# Patient Record
Sex: MALE | Race: WHITE | Employment: UNEMPLOYED | URBAN - METROPOLITAN AREA
[De-identification: names, ages, dates, MRNs, and addresses within clinical notes are randomized per-mention and may not be internally consistent; named-entity substitution may affect disease eponyms.]

---

## 2024-05-22 LAB
CHOLEST SERPL-MCNC: 114 MG/DL
HBA1C MFR BLD: 6 % (ref 4–5.6)
HDLC SERPL-MCNC: 45 MG/DL
LDLC SERPL CALC-MCNC: 45 MG/DL
TRIGL SERPL-MCNC: 120 MG/DL
VLDLC SERPL CALC-MCNC: 24 MG/DL

## 2024-05-28 LAB
ABSOLUTE BANDS: NORMAL K/UL (ref 0–1)
ABSOLUTE PLASMA CELLS: NORMAL K/UL
ALBUMIN SERPL-MCNC: 3.4 G/DL (ref 3.5–5.2)
ALP SERPL-CCNC: 77 U/L (ref 40–129)
ALT SERPL-CCNC: <5 U/L (ref 0–40)
ANION GAP SERPL CALCULATED.3IONS-SCNC: 13 MMOL/L (ref 7–16)
AST SERPL-CCNC: 30 U/L (ref 0–39)
ATYPICAL LYMPHOCYTE ABSOLUTE COUNT: NORMAL K/UL
ATYPICAL LYMPHOCYTES: NORMAL %
BANDS: NORMAL %
BASOPHILS # BLD: NORMAL K/UL (ref 0–0.2)
BASOPHILS NFR BLD: NORMAL % (ref 0–2)
BILIRUB SERPL-MCNC: 0.4 MG/DL (ref 0–1.2)
BLASTS ABSOLUTE COUNT: NORMAL K/UL
BLASTS: NORMAL %
BUN SERPL-MCNC: 9 MG/DL (ref 6–23)
CALCIUM SERPL-MCNC: 9.4 MG/DL (ref 8.6–10.2)
CHLORIDE SERPL-SCNC: 112 MMOL/L (ref 98–107)
CK SERPL-CCNC: 178 U/L (ref 20–200)
CO2 SERPL-SCNC: 22 MMOL/L (ref 22–29)
CREAT SERPL-MCNC: 1 MG/DL (ref 0.7–1.2)
EOSINOPHIL # BLD: NORMAL K/UL (ref 0–0.4)
EOSINOPHILS RELATIVE PERCENT: NORMAL % (ref 1–4)
ERYTHROCYTE [DISTWIDTH] IN BLOOD BY AUTOMATED COUNT: NORMAL % (ref 11.8–14.4)
GFR, ESTIMATED: 90 ML/MIN/1.73M2
GLUCOSE SERPL-MCNC: 131 MG/DL (ref 74–99)
HCT VFR BLD AUTO: NORMAL % (ref 40.7–50.3)
HGB BLD-MCNC: NORMAL G/DL (ref 13–17)
IMM GRANULOCYTES # BLD AUTO: NORMAL K/UL (ref 0–0.3)
IMM GRANULOCYTES NFR BLD: NORMAL %
LITHIUM DATE LAST DOSE: NORMAL
LITHIUM DOSE AMOUNT: NORMAL
LITHIUM DOSE TIME: NORMAL
LITHIUM LEVEL: 1 MMOL/L (ref 0.5–1.5)
LYMPHOCYTES NFR BLD: NORMAL K/UL (ref 1–4.8)
LYMPHOCYTES RELATIVE PERCENT: NORMAL % (ref 24–44)
MCH RBC QN AUTO: NORMAL PG (ref 25.2–33.5)
MCHC RBC AUTO-ENTMCNC: NORMAL G/DL (ref 28.4–34.8)
MCV RBC AUTO: NORMAL FL (ref 82.6–102.9)
METAMYELOCYTES ABSOLUTE COUNT: NORMAL K/UL
METAMYELOCYTES: NORMAL %
MONOCYTES NFR BLD: NORMAL % (ref 1–7)
MONOCYTES NFR BLD: NORMAL K/UL (ref 0.1–0.8)
MYELOCYTES ABSOLUTE COUNT: NORMAL K/UL
MYELOCYTES: NORMAL %
NEUTROPHILS NFR BLD: NORMAL % (ref 36–66)
NEUTS SEG NFR BLD: NORMAL K/UL (ref 1.8–7.7)
NRBC BLD-RTO: NORMAL PER 100 WBC
NUCLEATED RED BLOOD CELLS: NORMAL PER 100 WBC
PLASMA CELLS: NORMAL %
PLATELET # BLD AUTO: NORMAL K/UL (ref 138–453)
PLATELET ESTIMATE: NORMAL
PLATELET, FLUORESCENCE: NORMAL K/UL (ref 138–453)
PLATELETS.RETICULATED NFR BLD AUTO: NORMAL % (ref 1.1–10.3)
PMV BLD AUTO: NORMAL FL (ref 8.1–13.5)
POTASSIUM SERPL-SCNC: 4.5 MMOL/L (ref 3.5–5)
PROMYELOCYTES ABSOLUTE COUNT: NORMAL K/UL
PROMYELOCYTES: NORMAL %
PROT SERPL-MCNC: 5.9 G/DL (ref 6.4–8.3)
RBC # BLD AUTO: NORMAL M/UL (ref 4.21–5.77)
RBC # BLD: NORMAL 10*6/UL
SODIUM SERPL-SCNC: 147 MMOL/L (ref 132–146)
WBC # BLD: NORMAL 10*3/UL
WBC OTHER # BLD: NORMAL K/UL (ref 3.5–11.3)

## 2024-05-29 LAB
BASOPHILS # BLD: 0.03 K/UL (ref 0–0.2)
BASOPHILS NFR BLD: 0 % (ref 0–2)
EOSINOPHIL # BLD: 0.17 K/UL (ref 0.05–0.5)
EOSINOPHILS RELATIVE PERCENT: 2 % (ref 0–6)
ERYTHROCYTE [DISTWIDTH] IN BLOOD BY AUTOMATED COUNT: 15.9 % (ref 11.5–15)
HCT VFR BLD AUTO: 33.5 % (ref 37–54)
HGB BLD-MCNC: 10.9 G/DL (ref 12.5–16.5)
IMM GRANULOCYTES # BLD AUTO: 0.06 K/UL (ref 0–0.58)
IMM GRANULOCYTES NFR BLD: 1 % (ref 0–5)
LYMPHOCYTES NFR BLD: 1.34 K/UL (ref 1.5–4)
LYMPHOCYTES RELATIVE PERCENT: 18 % (ref 20–42)
MCH RBC QN AUTO: 33.9 PG (ref 26–35)
MCHC RBC AUTO-ENTMCNC: 32.5 G/DL (ref 32–34.5)
MCV RBC AUTO: 104 FL (ref 80–99.9)
MONOCYTES NFR BLD: 0.96 K/UL (ref 0.1–0.95)
MONOCYTES NFR BLD: 13 % (ref 2–12)
NEUTROPHILS NFR BLD: 65 % (ref 43–80)
NEUTS SEG NFR BLD: 4.82 K/UL (ref 1.8–7.3)
PLATELET # BLD AUTO: 278 K/UL (ref 130–450)
PMV BLD AUTO: 9.6 FL (ref 7–12)
RBC # BLD AUTO: 3.22 M/UL (ref 3.8–5.8)
WBC OTHER # BLD: 7.4 K/UL (ref 4.5–11.5)

## 2024-05-30 LAB
BACTERIA URNS QL MICRO: ABNORMAL
BILIRUB UR QL STRIP: NEGATIVE
CLARITY UR: CLEAR
COLOR UR: YELLOW
EPI CELLS #/AREA URNS HPF: ABNORMAL /HPF
GLUCOSE UR STRIP-MCNC: NEGATIVE MG/DL
HGB UR QL STRIP.AUTO: NEGATIVE
KETONES UR STRIP-MCNC: NEGATIVE MG/DL
LEUKOCYTE ESTERASE UR QL STRIP: ABNORMAL
NITRITE UR QL STRIP: NEGATIVE
PH UR STRIP: 6 [PH] (ref 5–9)
PROT UR STRIP-MCNC: NEGATIVE MG/DL
RBC #/AREA URNS HPF: ABNORMAL /HPF
SP GR UR STRIP: 1.01 (ref 1–1.03)
UROBILINOGEN UR STRIP-ACNC: 0.2 EU/DL (ref 0–1)
WBC #/AREA URNS HPF: ABNORMAL /HPF

## 2024-05-31 LAB
MICROORGANISM SPEC CULT: ABNORMAL
MICROORGANISM SPEC CULT: ABNORMAL
SPECIMEN DESCRIPTION: ABNORMAL

## 2024-06-02 ENCOUNTER — APPOINTMENT (OUTPATIENT)
Dept: CT IMAGING | Age: 59
DRG: 004 | End: 2024-06-02
Payer: MEDICARE

## 2024-06-02 ENCOUNTER — APPOINTMENT (OUTPATIENT)
Dept: GENERAL RADIOLOGY | Age: 59
DRG: 004 | End: 2024-06-02
Payer: MEDICARE

## 2024-06-02 ENCOUNTER — HOSPITAL ENCOUNTER (INPATIENT)
Age: 59
LOS: 18 days | Discharge: ANOTHER HOSPITAL | DRG: 004 | End: 2024-06-20
Attending: EMERGENCY MEDICINE | Admitting: HOSPITALIST
Payer: MEDICARE

## 2024-06-02 DIAGNOSIS — R41.0 DISORIENTATION: Primary | ICD-10-CM

## 2024-06-02 DIAGNOSIS — M79.601 PAIN IN BOTH UPPER EXTREMITIES: ICD-10-CM

## 2024-06-02 DIAGNOSIS — J96.00 ACUTE RESPIRATORY FAILURE, UNSPECIFIED WHETHER WITH HYPOXIA OR HYPERCAPNIA (HCC): ICD-10-CM

## 2024-06-02 DIAGNOSIS — R41.82 ALTERED MENTAL STATUS, UNSPECIFIED ALTERED MENTAL STATUS TYPE: ICD-10-CM

## 2024-06-02 DIAGNOSIS — L02.419 CELLULITIS AND ABSCESS OF LEG: ICD-10-CM

## 2024-06-02 DIAGNOSIS — M79.602 PAIN IN BOTH UPPER EXTREMITIES: ICD-10-CM

## 2024-06-02 DIAGNOSIS — L03.119 CELLULITIS AND ABSCESS OF LEG: ICD-10-CM

## 2024-06-02 PROBLEM — J45.909 ASTHMA: Status: ACTIVE | Noted: 2024-06-02

## 2024-06-02 PROBLEM — L03.90 CELLULITIS: Status: ACTIVE | Noted: 2024-06-02

## 2024-06-02 PROBLEM — K21.9 GERD (GASTROESOPHAGEAL REFLUX DISEASE): Status: ACTIVE | Noted: 2024-06-02

## 2024-06-02 PROBLEM — E03.9 HYPOTHYROID: Status: ACTIVE | Noted: 2024-06-02

## 2024-06-02 PROBLEM — F32.A DEPRESSION: Status: ACTIVE | Noted: 2024-06-02

## 2024-06-02 PROBLEM — E87.0 HYPERNATREMIA: Status: ACTIVE | Noted: 2024-06-02

## 2024-06-02 PROBLEM — E78.5 HLD (HYPERLIPIDEMIA): Status: ACTIVE | Noted: 2024-06-02

## 2024-06-02 PROBLEM — E87.1 HYPONATREMIA: Status: ACTIVE | Noted: 2024-06-02

## 2024-06-02 PROBLEM — E87.1 HYPONATREMIA: Status: RESOLVED | Noted: 2024-06-02 | Resolved: 2024-06-02

## 2024-06-02 PROBLEM — F41.9 ANXIETY: Status: ACTIVE | Noted: 2024-06-02

## 2024-06-02 LAB
ALBUMIN SERPL-MCNC: 3 G/DL (ref 3.5–5.2)
ALP SERPL-CCNC: 74 U/L (ref 40–129)
ALT SERPL-CCNC: 6 U/L (ref 0–40)
AMMONIA PLAS-SCNC: 29 UMOL/L (ref 16–60)
AMPHET UR QL SCN: NEGATIVE
ANION GAP SERPL CALCULATED.3IONS-SCNC: 12 MMOL/L (ref 7–16)
APAP SERPL-MCNC: <5 UG/ML (ref 10–30)
AST SERPL-CCNC: 22 U/L (ref 0–39)
B PARAP IS1001 DNA NPH QL NAA+NON-PROBE: NOT DETECTED
B PERT DNA SPEC QL NAA+PROBE: NOT DETECTED
BARBITURATES UR QL SCN: NEGATIVE
BASOPHILS # BLD: 0.04 K/UL (ref 0–0.2)
BASOPHILS NFR BLD: 1 % (ref 0–2)
BENZODIAZ UR QL: NEGATIVE
BILIRUB SERPL-MCNC: 0.6 MG/DL (ref 0–1.2)
BILIRUB UR QL STRIP: NEGATIVE
BUN SERPL-MCNC: 8 MG/DL (ref 6–23)
BUPRENORPHINE UR QL: NEGATIVE
C PNEUM DNA NPH QL NAA+NON-PROBE: NOT DETECTED
CALCIUM SERPL-MCNC: 9.2 MG/DL (ref 8.6–10.2)
CANNABINOIDS UR QL SCN: NEGATIVE
CHLORIDE SERPL-SCNC: 115 MMOL/L (ref 98–107)
CLARITY UR: CLEAR
CO2 SERPL-SCNC: 21 MMOL/L (ref 22–29)
COCAINE UR QL SCN: NEGATIVE
COLOR UR: YELLOW
CORTIS SERPL-MCNC: 12.3 UG/DL (ref 2.7–18.4)
CORTISOL COLLECTION INFO: NORMAL
CREAT SERPL-MCNC: 1 MG/DL (ref 0.7–1.2)
CRP SERPL HS-MCNC: 37 MG/L (ref 0–5)
EOSINOPHIL # BLD: 0.17 K/UL (ref 0.05–0.5)
EOSINOPHILS RELATIVE PERCENT: 2 % (ref 0–6)
ERYTHROCYTE [DISTWIDTH] IN BLOOD BY AUTOMATED COUNT: 15.8 % (ref 11.5–15)
ETHANOLAMINE SERPL-MCNC: <10 MG/DL (ref 0–0.08)
FENTANYL UR QL: NEGATIVE
FLUAV RNA NPH QL NAA+NON-PROBE: NOT DETECTED
FLUBV RNA NPH QL NAA+NON-PROBE: NOT DETECTED
GFR, ESTIMATED: 83 ML/MIN/1.73M2
GLUCOSE SERPL-MCNC: 106 MG/DL (ref 74–99)
GLUCOSE UR STRIP-MCNC: NEGATIVE MG/DL
HADV DNA NPH QL NAA+NON-PROBE: NOT DETECTED
HCOV 229E RNA NPH QL NAA+NON-PROBE: NOT DETECTED
HCOV HKU1 RNA NPH QL NAA+NON-PROBE: NOT DETECTED
HCOV NL63 RNA NPH QL NAA+NON-PROBE: NOT DETECTED
HCOV OC43 RNA NPH QL NAA+NON-PROBE: NOT DETECTED
HCT VFR BLD AUTO: 29.2 % (ref 37–54)
HGB BLD-MCNC: 9.1 G/DL (ref 12.5–16.5)
HGB UR QL STRIP.AUTO: NEGATIVE
HMPV RNA NPH QL NAA+NON-PROBE: NOT DETECTED
HPIV1 RNA NPH QL NAA+NON-PROBE: NOT DETECTED
HPIV2 RNA NPH QL NAA+NON-PROBE: NOT DETECTED
HPIV3 RNA NPH QL NAA+NON-PROBE: DETECTED
HPIV4 RNA NPH QL NAA+NON-PROBE: NOT DETECTED
IMM GRANULOCYTES # BLD AUTO: 0.04 K/UL (ref 0–0.58)
IMM GRANULOCYTES NFR BLD: 1 % (ref 0–5)
KETONES UR STRIP-MCNC: NEGATIVE MG/DL
LACTATE BLDV-SCNC: 1 MMOL/L (ref 0.5–1.9)
LEUKOCYTE ESTERASE UR QL STRIP: NEGATIVE
LYMPHOCYTES NFR BLD: 0.71 K/UL (ref 1.5–4)
LYMPHOCYTES RELATIVE PERCENT: 10 % (ref 20–42)
M PNEUMO DNA NPH QL NAA+NON-PROBE: NOT DETECTED
MAGNESIUM SERPL-MCNC: 2.1 MG/DL (ref 1.6–2.6)
MCH RBC QN AUTO: 32.9 PG (ref 26–35)
MCHC RBC AUTO-ENTMCNC: 31.2 G/DL (ref 32–34.5)
MCV RBC AUTO: 105.4 FL (ref 80–99.9)
METHADONE UR QL: NEGATIVE
MONOCYTES NFR BLD: 1.08 K/UL (ref 0.1–0.95)
MONOCYTES NFR BLD: 15 % (ref 2–12)
NEUTROPHILS NFR BLD: 71 % (ref 43–80)
NEUTS SEG NFR BLD: 5.12 K/UL (ref 1.8–7.3)
NITRITE UR QL STRIP: NEGATIVE
OPIATES UR QL SCN: NEGATIVE
OXYCODONE UR QL SCN: NEGATIVE
PCP UR QL SCN: NEGATIVE
PH UR STRIP: 7 [PH] (ref 5–9)
PLATELET # BLD AUTO: 316 K/UL (ref 130–450)
PMV BLD AUTO: 9.8 FL (ref 7–12)
POTASSIUM SERPL-SCNC: 3.5 MMOL/L (ref 3.5–5)
PROCALCITONIN SERPL-MCNC: 0.2 NG/ML (ref 0–0.08)
PROT SERPL-MCNC: 5.5 G/DL (ref 6.4–8.3)
PROT UR STRIP-MCNC: NEGATIVE MG/DL
RBC # BLD AUTO: 2.77 M/UL (ref 3.8–5.8)
RSV RNA NPH QL NAA+NON-PROBE: NOT DETECTED
RV+EV RNA NPH QL NAA+NON-PROBE: NOT DETECTED
SALICYLATES SERPL-MCNC: <0.3 MG/DL (ref 0–30)
SARS-COV-2 RNA NPH QL NAA+NON-PROBE: NOT DETECTED
SODIUM SERPL-SCNC: 148 MMOL/L (ref 132–146)
SP GR UR STRIP: <1.005 (ref 1–1.03)
SPECIMEN DESCRIPTION: ABNORMAL
T4 SERPL-MCNC: 5 UG/DL (ref 4.5–11.7)
TEST INFORMATION: NORMAL
TOXIC TRICYCLIC SC,BLOOD: NEGATIVE
TROPONIN I SERPL HS-MCNC: 146 NG/L (ref 0–11)
TROPONIN I SERPL HS-MCNC: 172 NG/L (ref 0–11)
TSH SERPL DL<=0.05 MIU/L-ACNC: 2.24 UIU/ML (ref 0.27–4.2)
UROBILINOGEN UR STRIP-ACNC: 0.2 EU/DL (ref 0–1)
WBC OTHER # BLD: 7.2 K/UL (ref 4.5–11.5)

## 2024-06-02 PROCEDURE — 2580000003 HC RX 258

## 2024-06-02 PROCEDURE — 80179 DRUG ASSAY SALICYLATE: CPT

## 2024-06-02 PROCEDURE — 0202U NFCT DS 22 TRGT SARS-COV-2: CPT

## 2024-06-02 PROCEDURE — 72125 CT NECK SPINE W/O DYE: CPT

## 2024-06-02 PROCEDURE — 70450 CT HEAD/BRAIN W/O DYE: CPT

## 2024-06-02 PROCEDURE — 99221 1ST HOSP IP/OBS SF/LOW 40: CPT | Performed by: NURSE PRACTITIONER

## 2024-06-02 PROCEDURE — 6360000002 HC RX W HCPCS

## 2024-06-02 PROCEDURE — 80053 COMPREHEN METABOLIC PANEL: CPT

## 2024-06-02 PROCEDURE — 84484 ASSAY OF TROPONIN QUANT: CPT

## 2024-06-02 PROCEDURE — 71045 X-RAY EXAM CHEST 1 VIEW: CPT

## 2024-06-02 PROCEDURE — 99285 EMERGENCY DEPT VISIT HI MDM: CPT

## 2024-06-02 PROCEDURE — 86140 C-REACTIVE PROTEIN: CPT

## 2024-06-02 PROCEDURE — G0480 DRUG TEST DEF 1-7 CLASSES: HCPCS

## 2024-06-02 PROCEDURE — 6360000002 HC RX W HCPCS: Performed by: NURSE PRACTITIONER

## 2024-06-02 PROCEDURE — 82533 TOTAL CORTISOL: CPT

## 2024-06-02 PROCEDURE — 81003 URINALYSIS AUTO W/O SCOPE: CPT

## 2024-06-02 PROCEDURE — 83605 ASSAY OF LACTIC ACID: CPT

## 2024-06-02 PROCEDURE — 2580000003 HC RX 258: Performed by: NURSE PRACTITIONER

## 2024-06-02 PROCEDURE — 80143 DRUG ASSAY ACETAMINOPHEN: CPT

## 2024-06-02 PROCEDURE — 87040 BLOOD CULTURE FOR BACTERIA: CPT

## 2024-06-02 PROCEDURE — 6370000000 HC RX 637 (ALT 250 FOR IP): Performed by: NURSE PRACTITIONER

## 2024-06-02 PROCEDURE — 2060000000 HC ICU INTERMEDIATE R&B

## 2024-06-02 PROCEDURE — 82140 ASSAY OF AMMONIA: CPT

## 2024-06-02 PROCEDURE — 84436 ASSAY OF TOTAL THYROXINE: CPT

## 2024-06-02 PROCEDURE — 93005 ELECTROCARDIOGRAM TRACING: CPT

## 2024-06-02 PROCEDURE — 96365 THER/PROPH/DIAG IV INF INIT: CPT

## 2024-06-02 PROCEDURE — 84443 ASSAY THYROID STIM HORMONE: CPT

## 2024-06-02 PROCEDURE — 84145 PROCALCITONIN (PCT): CPT

## 2024-06-02 PROCEDURE — 83735 ASSAY OF MAGNESIUM: CPT

## 2024-06-02 PROCEDURE — 80307 DRUG TEST PRSMV CHEM ANLYZR: CPT

## 2024-06-02 PROCEDURE — 85025 COMPLETE CBC W/AUTO DIFF WBC: CPT

## 2024-06-02 PROCEDURE — 96375 TX/PRO/DX INJ NEW DRUG ADDON: CPT

## 2024-06-02 RX ORDER — POTASSIUM CHLORIDE 20 MEQ/1
40 TABLET, EXTENDED RELEASE ORAL PRN
Status: DISCONTINUED | OUTPATIENT
Start: 2024-06-02 | End: 2024-06-20 | Stop reason: HOSPADM

## 2024-06-02 RX ORDER — GUAIFENESIN/DEXTROMETHORPHAN 100-10MG/5
10 SYRUP ORAL EVERY 4 HOURS PRN
COMMUNITY

## 2024-06-02 RX ORDER — AZATHIOPRINE 50 MG/1
150 TABLET ORAL DAILY
COMMUNITY

## 2024-06-02 RX ORDER — MEDROXYPROGESTERONE ACETATE 10 MG/1
10 TABLET ORAL DAILY
Status: DISCONTINUED | OUTPATIENT
Start: 2024-06-02 | End: 2024-06-20 | Stop reason: HOSPADM

## 2024-06-02 RX ORDER — AMLODIPINE BESYLATE 2.5 MG/1
2.5 TABLET ORAL DAILY
COMMUNITY

## 2024-06-02 RX ORDER — PANTOPRAZOLE SODIUM 40 MG/1
40 TABLET, DELAYED RELEASE ORAL DAILY
COMMUNITY

## 2024-06-02 RX ORDER — HALOPERIDOL 5 MG/1
5 TABLET ORAL EVERY 6 HOURS PRN
COMMUNITY

## 2024-06-02 RX ORDER — GABAPENTIN 800 MG/1
800 TABLET ORAL 3 TIMES DAILY
COMMUNITY

## 2024-06-02 RX ORDER — IBUPROFEN 200 MG
1 TABLET ORAL DAILY
COMMUNITY

## 2024-06-02 RX ORDER — ATORVASTATIN CALCIUM 10 MG/1
10 TABLET, FILM COATED ORAL
Status: DISCONTINUED | OUTPATIENT
Start: 2024-06-02 | End: 2024-06-20 | Stop reason: HOSPADM

## 2024-06-02 RX ORDER — FAMOTIDINE 20 MG/1
20 TABLET, FILM COATED ORAL DAILY
Status: DISCONTINUED | OUTPATIENT
Start: 2024-06-02 | End: 2024-06-07

## 2024-06-02 RX ORDER — FINASTERIDE 5 MG/1
5 TABLET, FILM COATED ORAL DAILY
Status: DISCONTINUED | OUTPATIENT
Start: 2024-06-02 | End: 2024-06-20 | Stop reason: HOSPADM

## 2024-06-02 RX ORDER — ONDANSETRON 4 MG/1
4 TABLET, ORALLY DISINTEGRATING ORAL EVERY 8 HOURS PRN
Status: DISCONTINUED | OUTPATIENT
Start: 2024-06-02 | End: 2024-06-20 | Stop reason: HOSPADM

## 2024-06-02 RX ORDER — ACETAMINOPHEN 650 MG/1
650 SUPPOSITORY RECTAL EVERY 6 HOURS PRN
Status: DISCONTINUED | OUTPATIENT
Start: 2024-06-02 | End: 2024-06-20 | Stop reason: HOSPADM

## 2024-06-02 RX ORDER — BENZTROPINE MESYLATE 0.5 MG/1
1 TABLET ORAL 2 TIMES DAILY PRN
Status: DISCONTINUED | OUTPATIENT
Start: 2024-06-02 | End: 2024-06-20 | Stop reason: HOSPADM

## 2024-06-02 RX ORDER — 0.9 % SODIUM CHLORIDE 0.9 %
1000 INTRAVENOUS SOLUTION INTRAVENOUS ONCE
Status: COMPLETED | OUTPATIENT
Start: 2024-06-02 | End: 2024-06-02

## 2024-06-02 RX ORDER — PRAZOSIN HYDROCHLORIDE 2 MG/1
2 CAPSULE ORAL NIGHTLY
Status: DISCONTINUED | OUTPATIENT
Start: 2024-06-02 | End: 2024-06-03

## 2024-06-02 RX ORDER — AZATHIOPRINE 50 MG/1
150 TABLET ORAL DAILY
Status: DISCONTINUED | OUTPATIENT
Start: 2024-06-02 | End: 2024-06-20 | Stop reason: HOSPADM

## 2024-06-02 RX ORDER — PROPRANOLOL HYDROCHLORIDE 10 MG/1
10 TABLET ORAL 2 TIMES DAILY
Status: DISCONTINUED | OUTPATIENT
Start: 2024-06-02 | End: 2024-06-20 | Stop reason: HOSPADM

## 2024-06-02 RX ORDER — FINASTERIDE 5 MG/1
5 TABLET, FILM COATED ORAL DAILY
COMMUNITY

## 2024-06-02 RX ORDER — POLYETHYLENE GLYCOL 3350 17 G/17G
17 POWDER, FOR SOLUTION ORAL DAILY PRN
Status: DISCONTINUED | OUTPATIENT
Start: 2024-06-02 | End: 2024-06-20 | Stop reason: HOSPADM

## 2024-06-02 RX ORDER — PROPRANOLOL HYDROCHLORIDE 10 MG/1
10 TABLET ORAL 2 TIMES DAILY
COMMUNITY

## 2024-06-02 RX ORDER — TAMSULOSIN HYDROCHLORIDE 0.4 MG/1
0.4 CAPSULE ORAL
Status: DISCONTINUED | OUTPATIENT
Start: 2024-06-02 | End: 2024-06-20 | Stop reason: HOSPADM

## 2024-06-02 RX ORDER — ATORVASTATIN CALCIUM 10 MG/1
10 TABLET, FILM COATED ORAL
COMMUNITY

## 2024-06-02 RX ORDER — OXYBUTYNIN CHLORIDE 5 MG/1
5 TABLET ORAL 2 TIMES DAILY
Status: DISCONTINUED | OUTPATIENT
Start: 2024-06-02 | End: 2024-06-20 | Stop reason: HOSPADM

## 2024-06-02 RX ORDER — LITHIUM CARBONATE 300 MG/1
600 TABLET, FILM COATED, EXTENDED RELEASE ORAL
COMMUNITY

## 2024-06-02 RX ORDER — ENOXAPARIN SODIUM 100 MG/ML
30 INJECTION SUBCUTANEOUS 2 TIMES DAILY
Status: DISCONTINUED | OUTPATIENT
Start: 2024-06-02 | End: 2024-06-02 | Stop reason: ALTCHOICE

## 2024-06-02 RX ORDER — QUETIAPINE FUMARATE 400 MG/1
400 TABLET, FILM COATED ORAL
COMMUNITY

## 2024-06-02 RX ORDER — CEPHALEXIN 500 MG/1
500 CAPSULE ORAL 3 TIMES DAILY
COMMUNITY
Start: 2024-06-01 | End: 2024-06-05

## 2024-06-02 RX ORDER — POLYETHYLENE GLYCOL 3350 17 G/17G
17 POWDER, FOR SOLUTION ORAL DAILY PRN
COMMUNITY

## 2024-06-02 RX ORDER — ONDANSETRON 2 MG/ML
4 INJECTION INTRAMUSCULAR; INTRAVENOUS EVERY 6 HOURS PRN
Status: DISCONTINUED | OUTPATIENT
Start: 2024-06-02 | End: 2024-06-20 | Stop reason: HOSPADM

## 2024-06-02 RX ORDER — SODIUM CHLORIDE 9 MG/ML
INJECTION, SOLUTION INTRAVENOUS PRN
Status: DISCONTINUED | OUTPATIENT
Start: 2024-06-02 | End: 2024-06-07

## 2024-06-02 RX ORDER — HALOPERIDOL 5 MG/ML
5 INJECTION INTRAMUSCULAR EVERY 6 HOURS PRN
COMMUNITY

## 2024-06-02 RX ORDER — OXYBUTYNIN CHLORIDE 5 MG/1
5 TABLET ORAL 2 TIMES DAILY
COMMUNITY

## 2024-06-02 RX ORDER — MAGNESIUM SULFATE IN WATER 40 MG/ML
2000 INJECTION, SOLUTION INTRAVENOUS PRN
Status: DISCONTINUED | OUTPATIENT
Start: 2024-06-02 | End: 2024-06-20 | Stop reason: HOSPADM

## 2024-06-02 RX ORDER — HYDROXYZINE PAMOATE 50 MG/1
50 CAPSULE ORAL 4 TIMES DAILY PRN
COMMUNITY

## 2024-06-02 RX ORDER — SODIUM CHLORIDE 0.9 % (FLUSH) 0.9 %
5-40 SYRINGE (ML) INJECTION PRN
Status: DISCONTINUED | OUTPATIENT
Start: 2024-06-02 | End: 2024-06-07

## 2024-06-02 RX ORDER — GABAPENTIN 400 MG/1
800 CAPSULE ORAL 3 TIMES DAILY
Status: DISCONTINUED | OUTPATIENT
Start: 2024-06-02 | End: 2024-06-20 | Stop reason: HOSPADM

## 2024-06-02 RX ORDER — QUETIAPINE FUMARATE 200 MG/1
400 TABLET, FILM COATED ORAL
Status: DISCONTINUED | OUTPATIENT
Start: 2024-06-02 | End: 2024-06-03

## 2024-06-02 RX ORDER — ACETAMINOPHEN 325 MG/1
650 TABLET ORAL EVERY 6 HOURS PRN
Status: DISCONTINUED | OUTPATIENT
Start: 2024-06-02 | End: 2024-06-20 | Stop reason: HOSPADM

## 2024-06-02 RX ORDER — POTASSIUM CHLORIDE 7.45 MG/ML
10 INJECTION INTRAVENOUS PRN
Status: DISCONTINUED | OUTPATIENT
Start: 2024-06-02 | End: 2024-06-20 | Stop reason: HOSPADM

## 2024-06-02 RX ORDER — PALIPERIDONE 6 MG/1
6 TABLET, EXTENDED RELEASE ORAL EVERY MORNING
Status: DISCONTINUED | OUTPATIENT
Start: 2024-06-03 | End: 2024-06-03

## 2024-06-02 RX ORDER — FAMOTIDINE 20 MG/1
20 TABLET, FILM COATED ORAL DAILY
COMMUNITY

## 2024-06-02 RX ORDER — TAMSULOSIN HYDROCHLORIDE 0.4 MG/1
0.4 CAPSULE ORAL
COMMUNITY

## 2024-06-02 RX ORDER — PALIPERIDONE 6 MG/1
6 TABLET, EXTENDED RELEASE ORAL EVERY MORNING
COMMUNITY

## 2024-06-02 RX ORDER — ALBUTEROL SULFATE 90 UG/1
1 AEROSOL, METERED RESPIRATORY (INHALATION) EVERY 4 HOURS PRN
COMMUNITY

## 2024-06-02 RX ORDER — ACETAMINOPHEN 325 MG/1
650 TABLET ORAL EVERY 6 HOURS PRN
COMMUNITY

## 2024-06-02 RX ORDER — AMLODIPINE BESYLATE 2.5 MG/1
2.5 TABLET ORAL DAILY
Status: DISCONTINUED | OUTPATIENT
Start: 2024-06-02 | End: 2024-06-14

## 2024-06-02 RX ORDER — HYDROXYZINE PAMOATE 50 MG/1
50 CAPSULE ORAL 4 TIMES DAILY PRN
Status: DISCONTINUED | OUTPATIENT
Start: 2024-06-02 | End: 2024-06-20 | Stop reason: HOSPADM

## 2024-06-02 RX ORDER — MEDROXYPROGESTERONE ACETATE 10 MG/1
10 TABLET ORAL DAILY
COMMUNITY

## 2024-06-02 RX ORDER — SODIUM CHLORIDE 0.9 % (FLUSH) 0.9 %
5-40 SYRINGE (ML) INJECTION EVERY 12 HOURS SCHEDULED
Status: DISCONTINUED | OUTPATIENT
Start: 2024-06-02 | End: 2024-06-07

## 2024-06-02 RX ORDER — PRAZOSIN HYDROCHLORIDE 2 MG/1
2 CAPSULE ORAL NIGHTLY
COMMUNITY

## 2024-06-02 RX ORDER — PANTOPRAZOLE SODIUM 40 MG/1
40 TABLET, DELAYED RELEASE ORAL DAILY
Status: DISCONTINUED | OUTPATIENT
Start: 2024-06-02 | End: 2024-06-07

## 2024-06-02 RX ORDER — LITHIUM CARBONATE 300 MG/1
600 TABLET, FILM COATED, EXTENDED RELEASE ORAL
Status: DISCONTINUED | OUTPATIENT
Start: 2024-06-02 | End: 2024-06-03

## 2024-06-02 RX ORDER — BENZTROPINE MESYLATE 1 MG/1
1 TABLET ORAL 2 TIMES DAILY PRN
COMMUNITY

## 2024-06-02 RX ADMIN — SODIUM CHLORIDE 1000 ML: 9 INJECTION, SOLUTION INTRAVENOUS at 08:42

## 2024-06-02 RX ADMIN — SODIUM CHLORIDE, PRESERVATIVE FREE 10 ML: 5 INJECTION INTRAVENOUS at 20:44

## 2024-06-02 RX ADMIN — APIXABAN 2.5 MG: 5 TABLET, FILM COATED ORAL at 20:29

## 2024-06-02 RX ADMIN — MEDROXYPROGESTERONE ACETATE 10 MG: 10 TABLET ORAL at 23:04

## 2024-06-02 RX ADMIN — AZATHIOPRINE 150 MG: 50 TABLET ORAL at 23:04

## 2024-06-02 RX ADMIN — PRAZOSIN HYDROCHLORIDE 2 MG: 2 CAPSULE ORAL at 20:38

## 2024-06-02 RX ADMIN — FINASTERIDE 5 MG: 5 TABLET, FILM COATED ORAL at 20:41

## 2024-06-02 RX ADMIN — VANCOMYCIN HYDROCHLORIDE 2500 MG: 10 INJECTION, POWDER, LYOPHILIZED, FOR SOLUTION INTRAVENOUS at 09:28

## 2024-06-02 RX ADMIN — CARBIDOPA AND LEVODOPA 1 TABLET: 25; 100 TABLET ORAL at 20:40

## 2024-06-02 RX ADMIN — BENZTROPINE MESYLATE 1 MG: 1 TABLET ORAL at 23:03

## 2024-06-02 RX ADMIN — TAMSULOSIN HYDROCHLORIDE 0.4 MG: 0.4 CAPSULE ORAL at 20:29

## 2024-06-02 RX ADMIN — LITHIUM CARBONATE 600 MG: 300 TABLET, EXTENDED RELEASE ORAL at 20:43

## 2024-06-02 RX ADMIN — ATORVASTATIN CALCIUM 10 MG: 10 TABLET, FILM COATED ORAL at 20:29

## 2024-06-02 RX ADMIN — PIPERACILLIN AND TAZOBACTAM 3375 MG: 3; .375 INJECTION, POWDER, FOR SOLUTION INTRAVENOUS at 08:43

## 2024-06-02 RX ADMIN — HYDROXYZINE PAMOATE 50 MG: 25 CAPSULE ORAL at 20:52

## 2024-06-02 RX ADMIN — PROPRANOLOL HYDROCHLORIDE 10 MG: 10 TABLET ORAL at 20:39

## 2024-06-02 RX ADMIN — QUETIAPINE FUMARATE 400 MG: 200 TABLET ORAL at 20:32

## 2024-06-02 RX ADMIN — OXYBUTYNIN CHLORIDE 5 MG: 5 TABLET ORAL at 20:43

## 2024-06-02 ASSESSMENT — PAIN DESCRIPTION - FREQUENCY: FREQUENCY: CONTINUOUS

## 2024-06-02 ASSESSMENT — PAIN - FUNCTIONAL ASSESSMENT
PAIN_FUNCTIONAL_ASSESSMENT: PREVENTS OR INTERFERES SOME ACTIVE ACTIVITIES AND ADLS
PAIN_FUNCTIONAL_ASSESSMENT: 0-10

## 2024-06-02 ASSESSMENT — PAIN DESCRIPTION - LOCATION: LOCATION: BUTTOCKS

## 2024-06-02 ASSESSMENT — PAIN DESCRIPTION - ORIENTATION: ORIENTATION: MID

## 2024-06-02 ASSESSMENT — PAIN DESCRIPTION - PAIN TYPE: TYPE: ACUTE PAIN

## 2024-06-02 ASSESSMENT — PAIN DESCRIPTION - ONSET: ONSET: ON-GOING

## 2024-06-02 ASSESSMENT — PAIN DESCRIPTION - DESCRIPTORS: DESCRIPTORS: ACHING

## 2024-06-02 NOTE — ED NOTES
Patient found on floor upon arrival at 1145, patient shows no obvious signs of physical trauma aside from initial assessment when patient arrived to ED. Dr. Gorman notified, physician assessment conducted. Unknown if patient hit head due to unwitnessed fall, and patients altered mental status of A+O x2. Aspen collar placed as precaution. Patient was logged rolled onto banks stretcher, and was lifted onto bed with x6 lift assist. Patient was taken CT. Awaiting results at this time.     Initiation for safety sitter was faxed staffing at 1144 due to patient having pulled out IV access with alternatives such as reorientation and redirection.

## 2024-06-02 NOTE — H&P
(ENSURE ENLIVE PO) Take 1 each by mouth 3 times daily (with meals)   Yes Barrera Diamond MD   QUEtiapine (SEROQUEL) 400 MG tablet Take 1 tablet by mouth nightly   Yes Barrera Diamond MD   apixaban (ELIQUIS) 5 MG TABS tablet Take 0.5 tablets by mouth 2 times daily   Yes Barrera Diamond MD   cephALEXin (KEFLEX) 500 MG capsule Take 1 capsule by mouth 3 times daily 6/1/24 6/5/24 Yes Barrera Diamond MD   azaTHIOprine (IMURAN) 50 MG tablet Take 1 tablet by mouth 3 times daily   Yes Barrera Diamond MD   acetaminophen (TYLENOL) 325 MG tablet Take 2 tablets by mouth every 6 hours as needed for Pain   Yes Barrera Diamond MD   polyethylene glycol (GLYCOLAX) 17 g packet Take 1 packet by mouth daily as needed for Constipation   Yes Barrera Diamond MD   diclofenac sodium (VOLTAREN) 1 % GEL Apply 2 g topically 4 times daily as needed for Pain   Yes Barrera Diamond MD   albuterol sulfate HFA (VENTOLIN HFA) 108 (90 Base) MCG/ACT inhaler Inhale 1 puff into the lungs every 4 hours as needed for Shortness of Breath or Wheezing   Yes Barrera Diamond MD   haloperidol (HALDOL) 5 MG tablet Take 1 tablet by mouth every 6 hours as needed (aggression)   Yes Barrera Diamond MD   haloperidol lactate (HALDOL) 5 MG/ML injection Inject 1 mL into the muscle every 6 hours as needed (aggression) Give IM if unable to take orally.   Yes Barrera Diamond MD   benztropine (COGENTIN) 1 MG tablet Take 1 tablet by mouth 2 times daily as needed (EPS symptoms)   Yes Barrera Diamond MD   hydrOXYzine pamoate (VISTARIL) 50 MG capsule Take 1 capsule by mouth 4 times daily as needed for Anxiety   Yes Barrera Diamond MD   benzocaine-menthol (CEPACOL SORE THROAT) 15-3.6 MG lozenge Take 1 lozenge by mouth every 2 hours as needed for Sore Throat   Yes Barrera Diamond MD   guaiFENesin-dextromethorphan (ROBITUSSIN DM) 100-10 MG/5ML syrup Take 10 mLs by mouth every 4 hours as needed for

## 2024-06-02 NOTE — ED PROVIDER NOTES
CRITICAL CARE TIME (.cct)           I am the Primary Clinician of Record.    FINAL IMPRESSION      1. Disorientation    2. Pain in both upper extremities    3. Altered mental status, unspecified altered mental status type    4. Cellulitis and abscess of leg          DISPOSITION/PLAN     DISPOSITION Admitted 06/02/2024 01:42:53 PM      PATIENT REFERRED TO:  No follow-up provider specified.    DISCHARGE MEDICATIONS:  New Prescriptions    No medications on file       DISCONTINUED MEDICATIONS:  Discontinued Medications    No medications on file              (Please note that portions of this note were completed with a voice recognition program.  Efforts were made to edit the dictations but occasionally words are mis-transcribed.)    Sanford Crane II,  (electronically signed)

## 2024-06-03 LAB
ANION GAP SERPL CALCULATED.3IONS-SCNC: 12 MMOL/L (ref 7–16)
ANION GAP SERPL CALCULATED.3IONS-SCNC: 16 MMOL/L (ref 7–16)
BASOPHILS # BLD: 0.05 K/UL (ref 0–0.2)
BASOPHILS NFR BLD: 1 % (ref 0–2)
BUN SERPL-MCNC: 7 MG/DL (ref 6–23)
BUN SERPL-MCNC: 7 MG/DL (ref 6–23)
CALCIUM SERPL-MCNC: 10.3 MG/DL (ref 8.6–10.2)
CALCIUM SERPL-MCNC: 9.8 MG/DL (ref 8.6–10.2)
CHLORIDE SERPL-SCNC: 133 MMOL/L (ref 98–107)
CHLORIDE SERPL-SCNC: 136 MMOL/L (ref 98–107)
CO2 SERPL-SCNC: 19 MMOL/L (ref 22–29)
CO2 SERPL-SCNC: 22 MMOL/L (ref 22–29)
CREAT SERPL-MCNC: 1.1 MG/DL (ref 0.7–1.2)
CREAT SERPL-MCNC: 1.1 MG/DL (ref 0.7–1.2)
EKG ATRIAL RATE: 88 BPM
EKG ATRIAL RATE: 92 BPM
EKG P AXIS: 51 DEGREES
EKG P AXIS: 56 DEGREES
EKG P-R INTERVAL: 136 MS
EKG P-R INTERVAL: 140 MS
EKG Q-T INTERVAL: 370 MS
EKG Q-T INTERVAL: 378 MS
EKG QRS DURATION: 82 MS
EKG QRS DURATION: 86 MS
EKG QTC CALCULATION (BAZETT): 447 MS
EKG QTC CALCULATION (BAZETT): 467 MS
EKG R AXIS: -16 DEGREES
EKG R AXIS: -23 DEGREES
EKG T AXIS: 59 DEGREES
EKG T AXIS: 70 DEGREES
EKG VENTRICULAR RATE: 88 BPM
EKG VENTRICULAR RATE: 92 BPM
EOSINOPHIL # BLD: 0.14 K/UL (ref 0.05–0.5)
EOSINOPHILS RELATIVE PERCENT: 2 % (ref 0–6)
ERYTHROCYTE [DISTWIDTH] IN BLOOD BY AUTOMATED COUNT: 15.7 % (ref 11.5–15)
GFR, ESTIMATED: 78 ML/MIN/1.73M2
GFR, ESTIMATED: 81 ML/MIN/1.73M2
GLUCOSE SERPL-MCNC: 93 MG/DL (ref 74–99)
GLUCOSE SERPL-MCNC: 95 MG/DL (ref 74–99)
HCT VFR BLD AUTO: 34 % (ref 37–54)
HGB BLD-MCNC: 10.2 G/DL (ref 12.5–16.5)
IMM GRANULOCYTES # BLD AUTO: 0.05 K/UL (ref 0–0.58)
IMM GRANULOCYTES NFR BLD: 1 % (ref 0–5)
LITHIUM DATE LAST DOSE: NORMAL
LITHIUM DOSE AMOUNT: NORMAL
LITHIUM DOSE TIME: NORMAL
LITHIUM LEVEL: 0.7 MMOL/L (ref 0.5–1.5)
LYMPHOCYTES NFR BLD: 0.85 K/UL (ref 1.5–4)
LYMPHOCYTES RELATIVE PERCENT: 13 % (ref 20–42)
MAGNESIUM SERPL-MCNC: 2.5 MG/DL (ref 1.6–2.6)
MAGNESIUM SERPL-MCNC: 2.5 MG/DL (ref 1.6–2.6)
MCH RBC QN AUTO: 32.2 PG (ref 26–35)
MCHC RBC AUTO-ENTMCNC: 30 G/DL (ref 32–34.5)
MCV RBC AUTO: 107.3 FL (ref 80–99.9)
MONOCYTES NFR BLD: 1.06 K/UL (ref 0.1–0.95)
MONOCYTES NFR BLD: 16 % (ref 2–12)
NEUTROPHILS NFR BLD: 68 % (ref 43–80)
NEUTS SEG NFR BLD: 4.58 K/UL (ref 1.8–7.3)
PLATELET # BLD AUTO: 353 K/UL (ref 130–450)
PMV BLD AUTO: 9.4 FL (ref 7–12)
POTASSIUM SERPL-SCNC: 3.2 MMOL/L (ref 3.5–5)
POTASSIUM SERPL-SCNC: 3.5 MMOL/L (ref 3.5–5)
RBC # BLD AUTO: 3.17 M/UL (ref 3.8–5.8)
SODIUM SERPL-SCNC: 167 MMOL/L (ref 132–146)
SODIUM SERPL-SCNC: 170 MMOL/L (ref 132–146)
SODIUM SERPL-SCNC: 171 MMOL/L (ref 132–146)
WBC OTHER # BLD: 6.7 K/UL (ref 4.5–11.5)

## 2024-06-03 PROCEDURE — 80048 BASIC METABOLIC PNL TOTAL CA: CPT

## 2024-06-03 PROCEDURE — 6370000000 HC RX 637 (ALT 250 FOR IP): Performed by: NURSE PRACTITIONER

## 2024-06-03 PROCEDURE — 2060000000 HC ICU INTERMEDIATE R&B

## 2024-06-03 PROCEDURE — 6360000002 HC RX W HCPCS: Performed by: NURSE PRACTITIONER

## 2024-06-03 PROCEDURE — 36415 COLL VENOUS BLD VENIPUNCTURE: CPT

## 2024-06-03 PROCEDURE — 6360000002 HC RX W HCPCS: Performed by: STUDENT IN AN ORGANIZED HEALTH CARE EDUCATION/TRAINING PROGRAM

## 2024-06-03 PROCEDURE — 6360000002 HC RX W HCPCS: Performed by: INTERNAL MEDICINE

## 2024-06-03 PROCEDURE — 83735 ASSAY OF MAGNESIUM: CPT

## 2024-06-03 PROCEDURE — 2580000003 HC RX 258: Performed by: INTERNAL MEDICINE

## 2024-06-03 PROCEDURE — 84295 ASSAY OF SERUM SODIUM: CPT

## 2024-06-03 PROCEDURE — 82088 ASSAY OF ALDOSTERONE: CPT

## 2024-06-03 PROCEDURE — 80178 ASSAY OF LITHIUM: CPT

## 2024-06-03 PROCEDURE — 85025 COMPLETE CBC W/AUTO DIFF WBC: CPT

## 2024-06-03 PROCEDURE — 2580000003 HC RX 258: Performed by: STUDENT IN AN ORGANIZED HEALTH CARE EDUCATION/TRAINING PROGRAM

## 2024-06-03 PROCEDURE — 99232 SBSQ HOSP IP/OBS MODERATE 35: CPT | Performed by: INTERNAL MEDICINE

## 2024-06-03 PROCEDURE — 99232 SBSQ HOSP IP/OBS MODERATE 35: CPT | Performed by: NURSE PRACTITIONER

## 2024-06-03 PROCEDURE — 93010 ELECTROCARDIOGRAM REPORT: CPT | Performed by: INTERNAL MEDICINE

## 2024-06-03 RX ORDER — DESMOPRESSIN ACETATE 4 UG/ML
2 INJECTION, SOLUTION INTRAVENOUS; SUBCUTANEOUS ONCE
Status: COMPLETED | OUTPATIENT
Start: 2024-06-03 | End: 2024-06-03

## 2024-06-03 RX ORDER — DESMOPRESSIN ACETATE 4 UG/ML
2 INJECTION, SOLUTION INTRAVENOUS; SUBCUTANEOUS ONCE
Status: DISCONTINUED | OUTPATIENT
Start: 2024-06-03 | End: 2024-06-03 | Stop reason: SDUPTHER

## 2024-06-03 RX ORDER — DEXTROSE MONOHYDRATE 50 MG/ML
INJECTION, SOLUTION INTRAVENOUS CONTINUOUS
Status: DISCONTINUED | OUTPATIENT
Start: 2024-06-03 | End: 2024-06-08

## 2024-06-03 RX ORDER — DIVALPROEX SODIUM 125 MG/1
125 CAPSULE, COATED PELLETS ORAL EVERY 12 HOURS SCHEDULED
Status: DISCONTINUED | OUTPATIENT
Start: 2024-06-03 | End: 2024-06-10

## 2024-06-03 RX ORDER — LANOLIN ALCOHOL/MO/W.PET/CERES
6 CREAM (GRAM) TOPICAL EVERY EVENING
Status: DISPENSED | OUTPATIENT
Start: 2024-06-03 | End: 2024-06-13

## 2024-06-03 RX ADMIN — MEDROXYPROGESTERONE ACETATE 10 MG: 10 TABLET ORAL at 09:16

## 2024-06-03 RX ADMIN — FAMOTIDINE 20 MG: 20 TABLET, FILM COATED ORAL at 09:16

## 2024-06-03 RX ADMIN — DESMOPRESSIN ACETATE 2 MCG: 4 INJECTION, SOLUTION INTRAVENOUS; SUBCUTANEOUS at 14:16

## 2024-06-03 RX ADMIN — TAMSULOSIN HYDROCHLORIDE 0.4 MG: 0.4 CAPSULE ORAL at 20:45

## 2024-06-03 RX ADMIN — FINASTERIDE 5 MG: 5 TABLET, FILM COATED ORAL at 09:15

## 2024-06-03 RX ADMIN — APIXABAN 2.5 MG: 5 TABLET, FILM COATED ORAL at 20:45

## 2024-06-03 RX ADMIN — DEXTROSE MONOHYDRATE: 50 INJECTION, SOLUTION INTRAVENOUS at 11:36

## 2024-06-03 RX ADMIN — ZIPRASIDONE MESYLATE 10 MG: 20 INJECTION, POWDER, LYOPHILIZED, FOR SOLUTION INTRAMUSCULAR at 00:46

## 2024-06-03 RX ADMIN — Medication 6 MG: at 20:51

## 2024-06-03 RX ADMIN — CARBIDOPA AND LEVODOPA 1 TABLET: 25; 100 TABLET ORAL at 09:15

## 2024-06-03 RX ADMIN — ZIPRASIDONE MESYLATE 20 MG: 20 INJECTION, POWDER, LYOPHILIZED, FOR SOLUTION INTRAMUSCULAR at 21:19

## 2024-06-03 RX ADMIN — AZATHIOPRINE 150 MG: 50 TABLET ORAL at 09:16

## 2024-06-03 RX ADMIN — PANTOPRAZOLE SODIUM 40 MG: 40 TABLET, DELAYED RELEASE ORAL at 09:15

## 2024-06-03 RX ADMIN — AMLODIPINE BESYLATE 2.5 MG: 5 TABLET ORAL at 09:16

## 2024-06-03 RX ADMIN — PROPRANOLOL HYDROCHLORIDE 10 MG: 10 TABLET ORAL at 09:15

## 2024-06-03 RX ADMIN — APIXABAN 2.5 MG: 5 TABLET, FILM COATED ORAL at 09:16

## 2024-06-03 RX ADMIN — ATORVASTATIN CALCIUM 10 MG: 10 TABLET, FILM COATED ORAL at 20:45

## 2024-06-03 RX ADMIN — CARBIDOPA AND LEVODOPA 1 TABLET: 25; 100 TABLET ORAL at 20:45

## 2024-06-03 RX ADMIN — CARBIDOPA AND LEVODOPA 1 TABLET: 25; 100 TABLET ORAL at 14:16

## 2024-06-03 RX ADMIN — PALIPERIDONE 6 MG: 6 TABLET, EXTENDED RELEASE ORAL at 09:15

## 2024-06-03 RX ADMIN — DIVALPROEX SODIUM 125 MG: 125 CAPSULE ORAL at 20:45

## 2024-06-03 NOTE — CARE COORDINATION
From [facility] - for AMS. history of myasthenia gravis, who presents for altered mental status. Was in adult unit for suicide attempt.  Per  report, patient is usually altered at baseline however has become much more altered. erythema left arm vanco x1  Fell in ER.. Unknown if patient hit head due to unwitnessed fall, and patients altered mental status of A+O x2. Aspen collar placed as precaution. Currently wiih sitter.per notes. ID and Nephrology consults. Elevated Sodium 167 with repeat 171   and chloride 133 with repeat 136 . Called [facility] intake- if does not come back within 3 days would need new admission status/ materials faxed. ID and nephology consults Call placed to brother Jayson [facility]  left. Envelope and ambulance form in soft chart. Electronically signed by Annabella Arguello RN on 6/3/2024 at 10:45 AM    Called [facility] Transfer center  spoke with Adilene flores of admission and clinicals faxed. Electronically signed by Annabella Arguello RN on 6/3/2024 at 1:48 PM    Call from FRED Salazar @ [facility] with extension [phone]. Was updated by timothy WALTERSElectronically signed by Annabella Arguello RN on 6/3/2024 at 2:08 PM    Psych consult- [facility] may [facility] upon medical stability if still warranted and they may continue his psychiatric care for continuity.Depakote sprinkle 125 twice daily x 10 days to reduce confusionMelatonin 6 mg evening for 10 days Electronically signed by Annabella Arguello RN on 6/3/2024 at 2:40 PM                                             Case Management Assessment  Initial Evaluation    Date/Time of Evaluation: 6/3/2024 10:53 AM  Assessment Completed by: Annabella Arguello RN    If patient is discharged prior to next notation, then this note serves as note for discharge by case management.    Patient Name: Jose G Will                   YOB: 1965  Diagnosis: Cellulitis and abscess of leg [L03.119, L02.419]  Disorientation

## 2024-06-04 ENCOUNTER — APPOINTMENT (OUTPATIENT)
Dept: ULTRASOUND IMAGING | Age: 59
DRG: 004 | End: 2024-06-04
Payer: MEDICARE

## 2024-06-04 LAB
ANION GAP SERPL CALCULATED.3IONS-SCNC: 15 MMOL/L (ref 7–16)
BASOPHILS # BLD: 0.07 K/UL (ref 0–0.2)
BASOPHILS NFR BLD: 1 % (ref 0–2)
BUN SERPL-MCNC: 7 MG/DL (ref 6–23)
CALCIUM SERPL-MCNC: 10.1 MG/DL (ref 8.6–10.2)
CHLORIDE SERPL-SCNC: 137 MMOL/L (ref 98–107)
CO2 SERPL-SCNC: 20 MMOL/L (ref 22–29)
CREAT SERPL-MCNC: 1.3 MG/DL (ref 0.7–1.2)
EOSINOPHIL # BLD: 0.21 K/UL (ref 0.05–0.5)
EOSINOPHILS RELATIVE PERCENT: 2 % (ref 0–6)
ERYTHROCYTE [DISTWIDTH] IN BLOOD BY AUTOMATED COUNT: 15.8 % (ref 11.5–15)
GFR, ESTIMATED: 65 ML/MIN/1.73M2
GLUCOSE SERPL-MCNC: 129 MG/DL (ref 74–99)
HCT VFR BLD AUTO: 41.5 % (ref 37–54)
HGB BLD-MCNC: 13.3 G/DL (ref 12.5–16.5)
IMM GRANULOCYTES # BLD AUTO: 0.05 K/UL (ref 0–0.58)
IMM GRANULOCYTES NFR BLD: 1 % (ref 0–5)
LYMPHOCYTES NFR BLD: 1.21 K/UL (ref 1.5–4)
LYMPHOCYTES RELATIVE PERCENT: 13 % (ref 20–42)
MAGNESIUM SERPL-MCNC: 2.3 MG/DL (ref 1.6–2.6)
MCH RBC QN AUTO: 34.2 PG (ref 26–35)
MCHC RBC AUTO-ENTMCNC: 32 G/DL (ref 32–34.5)
MCV RBC AUTO: 106.7 FL (ref 80–99.9)
MONOCYTES NFR BLD: 1.61 K/UL (ref 0.1–0.95)
MONOCYTES NFR BLD: 17 % (ref 2–12)
NEUTROPHILS NFR BLD: 67 % (ref 43–80)
NEUTS SEG NFR BLD: 6.21 K/UL (ref 1.8–7.3)
OSMOLALITY SERPL: 352 MOSM/KG (ref 285–310)
PLATELET # BLD AUTO: 361 K/UL (ref 130–450)
PMV BLD AUTO: 9.3 FL (ref 7–12)
POTASSIUM SERPL-SCNC: 3.4 MMOL/L (ref 3.5–5)
RBC # BLD AUTO: 3.89 M/UL (ref 3.8–5.8)
RBC # BLD: ABNORMAL 10*6/UL
SODIUM SERPL-SCNC: 169 MMOL/L (ref 132–146)
SODIUM SERPL-SCNC: 170 MMOL/L (ref 132–146)
SODIUM SERPL-SCNC: 171 MMOL/L (ref 132–146)
SODIUM SERPL-SCNC: 172 MMOL/L (ref 132–146)
SODIUM SERPL-SCNC: 172 MMOL/L (ref 132–146)
WBC OTHER # BLD: 9.4 K/UL (ref 4.5–11.5)

## 2024-06-04 PROCEDURE — 80048 BASIC METABOLIC PNL TOTAL CA: CPT

## 2024-06-04 PROCEDURE — 6360000002 HC RX W HCPCS: Performed by: INTERNAL MEDICINE

## 2024-06-04 PROCEDURE — 83930 ASSAY OF BLOOD OSMOLALITY: CPT

## 2024-06-04 PROCEDURE — 6360000002 HC RX W HCPCS: Performed by: NURSE PRACTITIONER

## 2024-06-04 PROCEDURE — 2060000000 HC ICU INTERMEDIATE R&B

## 2024-06-04 PROCEDURE — 6370000000 HC RX 637 (ALT 250 FOR IP): Performed by: NURSE PRACTITIONER

## 2024-06-04 PROCEDURE — 2580000003 HC RX 258: Performed by: NURSE PRACTITIONER

## 2024-06-04 PROCEDURE — 83735 ASSAY OF MAGNESIUM: CPT

## 2024-06-04 PROCEDURE — 99232 SBSQ HOSP IP/OBS MODERATE 35: CPT | Performed by: INTERNAL MEDICINE

## 2024-06-04 PROCEDURE — 93970 EXTREMITY STUDY: CPT

## 2024-06-04 PROCEDURE — 6360000002 HC RX W HCPCS: Performed by: STUDENT IN AN ORGANIZED HEALTH CARE EDUCATION/TRAINING PROGRAM

## 2024-06-04 PROCEDURE — 36415 COLL VENOUS BLD VENIPUNCTURE: CPT

## 2024-06-04 PROCEDURE — 2580000003 HC RX 258: Performed by: INTERNAL MEDICINE

## 2024-06-04 PROCEDURE — 84295 ASSAY OF SERUM SODIUM: CPT

## 2024-06-04 PROCEDURE — 85025 COMPLETE CBC W/AUTO DIFF WBC: CPT

## 2024-06-04 RX ORDER — LORAZEPAM 2 MG/ML
1 INJECTION INTRAMUSCULAR ONCE
Status: DISCONTINUED | OUTPATIENT
Start: 2024-06-04 | End: 2024-06-04

## 2024-06-04 RX ORDER — LORAZEPAM 2 MG/ML
1.5 INJECTION INTRAMUSCULAR ONCE
Status: COMPLETED | OUTPATIENT
Start: 2024-06-04 | End: 2024-06-04

## 2024-06-04 RX ORDER — DESMOPRESSIN ACETATE 4 UG/ML
2 INJECTION, SOLUTION INTRAVENOUS; SUBCUTANEOUS 2 TIMES DAILY
Status: DISCONTINUED | OUTPATIENT
Start: 2024-06-04 | End: 2024-06-06

## 2024-06-04 RX ADMIN — DEXTROSE MONOHYDRATE: 50 INJECTION, SOLUTION INTRAVENOUS at 11:50

## 2024-06-04 RX ADMIN — PROPRANOLOL HYDROCHLORIDE 10 MG: 10 TABLET ORAL at 10:41

## 2024-06-04 RX ADMIN — DEXTROSE MONOHYDRATE: 50 INJECTION, SOLUTION INTRAVENOUS at 02:24

## 2024-06-04 RX ADMIN — LORAZEPAM 1.5 MG: 2 INJECTION INTRAMUSCULAR; INTRAVENOUS at 02:00

## 2024-06-04 RX ADMIN — FAMOTIDINE 20 MG: 20 TABLET, FILM COATED ORAL at 10:40

## 2024-06-04 RX ADMIN — DIVALPROEX SODIUM 125 MG: 125 CAPSULE ORAL at 10:41

## 2024-06-04 RX ADMIN — FINASTERIDE 5 MG: 5 TABLET, FILM COATED ORAL at 10:41

## 2024-06-04 RX ADMIN — AMLODIPINE BESYLATE 2.5 MG: 5 TABLET ORAL at 10:40

## 2024-06-04 RX ADMIN — APIXABAN 2.5 MG: 5 TABLET, FILM COATED ORAL at 10:41

## 2024-06-04 RX ADMIN — DESMOPRESSIN ACETATE 2 MCG: 4 INJECTION, SOLUTION INTRAVENOUS; SUBCUTANEOUS at 11:43

## 2024-06-04 RX ADMIN — CARBIDOPA AND LEVODOPA 1 TABLET: 25; 100 TABLET ORAL at 15:08

## 2024-06-04 RX ADMIN — PANTOPRAZOLE SODIUM 40 MG: 40 TABLET, DELAYED RELEASE ORAL at 10:41

## 2024-06-04 RX ADMIN — MEDROXYPROGESTERONE ACETATE 10 MG: 10 TABLET ORAL at 10:41

## 2024-06-04 RX ADMIN — OXYBUTYNIN CHLORIDE 5 MG: 5 TABLET ORAL at 10:40

## 2024-06-04 RX ADMIN — CARBIDOPA AND LEVODOPA 1 TABLET: 25; 100 TABLET ORAL at 10:40

## 2024-06-04 RX ADMIN — DEXTROSE MONOHYDRATE: 50 INJECTION, SOLUTION INTRAVENOUS at 21:45

## 2024-06-04 RX ADMIN — SODIUM CHLORIDE, PRESERVATIVE FREE 10 ML: 5 INJECTION INTRAVENOUS at 10:41

## 2024-06-04 RX ADMIN — AZATHIOPRINE 150 MG: 50 TABLET ORAL at 10:41

## 2024-06-04 RX ADMIN — DESMOPRESSIN ACETATE 2 MCG: 4 INJECTION, SOLUTION INTRAVENOUS; SUBCUTANEOUS at 23:53

## 2024-06-04 NOTE — DISCHARGE INSTR - COC
{CHP DME ADLs:661091785}  Transfer  {CHP DME ADLs:285445869}  Bathing  {CHP DME ADLs:914876281}  Dressing  {CHP DME ADLs:058268151}  Toileting  {CHP DME ADLs:307328686}  Feeding  {CHP DME ADLs:056181424}  Med Admin  {P DME ADLs:654884285}  Med Delivery   { JOHN MED Delivery:276694963}    Wound Care Documentation and Therapy:        Elimination:  Continence:   Bowel: {YES / NO:}  Bladder: {YES / NO:}  Urinary Catheter: {Urinary Catheter:774985788}   Colostomy/Ileostomy/Ileal Conduit: {YES / NO:}       Date of Last BM: ***    Intake/Output Summary (Last 24 hours) at 2024 1414  Last data filed at 2024 0835  Gross per 24 hour   Intake 0 ml   Output --   Net 0 ml     I/O last 3 completed shifts:  In: 0   Out: 1700 [Urine:1700]    Safety Concerns:     { JOHN Safety Concerns:008863829}    Impairments/Disabilities:      { JOHN Impairments/Disabilities:103491188}    Nutrition Therapy:  Current Nutrition Therapy:   { JOHN Diet List:116538027}    Routes of Feeding: {[facility] DME Other Feedings:030001013}  Liquids: {Slp liquid thickness:08574}  Daily Fluid Restriction: {CHP DME Yes amt example:363897419}  Last Modified Barium Swallow with Video (Video Swallowing Test): {Done Not Done Date:}    Treatments at the Time of Hospital Discharge:   Respiratory Treatments: ***  Oxygen Therapy:  {Therapy; copd oxygen:36447}  Ventilator:    { CC Vent List:141424906}    Rehab Therapies: {THERAPEUTIC INTERVENTION:1805873218}  Weight Bearing Status/Restrictions: {[facility] Weight Bearin}  Other Medical Equipment (for information only, NOT a DME order):  {EQUIPMENT:395151977}  Other Treatments: ***    Patient's personal belongings (please select all that are sent with patient):  {[facility] DME Belongings:975181559}    RN SIGNATURE:  {Esignature:002052427}    CASE MANAGEMENT/SOCIAL WORK SECTION    Inpatient Status Date: ***    Readmission Risk Assessment Score:  Readmission Risk              Risk of

## 2024-06-04 NOTE — CARE COORDINATION
From [facility] - for AMS. history of myasthenia gravis, who presents for altered mental status. Was in adult unit for suicide attempt.erythema left arm Continues with sitter and now with restraints  ID and Nephrology consults sodium today 172 repeat 170 continues on dextrose infusion Lithium on hold, Per ID monitor off abx Per [facility] intake- if does not come back within 3 days would need new admission status/ materials faxed. Electronically signed by Annabella Arguello RN on 6/4/2024 at 1:29 PM    Ambulance form and envelope in soft chart.Electronically signed by Annabella Arguello RN on 6/4/2024 at 2:17 PM

## 2024-06-05 ENCOUNTER — APPOINTMENT (OUTPATIENT)
Dept: GENERAL RADIOLOGY | Age: 59
DRG: 004 | End: 2024-06-05
Payer: MEDICARE

## 2024-06-05 LAB
ALDOST SERPL-MCNC: 6.1 NG/DL
ALDOSTERONE COMMENT: NORMAL
ANION GAP SERPL CALCULATED.3IONS-SCNC: 14 MMOL/L (ref 7–16)
B.E.: -0.4 MMOL/L (ref -3–3)
BASOPHILS # BLD: 0.07 K/UL (ref 0–0.2)
BASOPHILS NFR BLD: 1 % (ref 0–2)
BUN SERPL-MCNC: 9 MG/DL (ref 6–23)
CALCIUM SERPL-MCNC: 9.4 MG/DL (ref 8.6–10.2)
CHLORIDE SERPL-SCNC: 132 MMOL/L (ref 98–107)
CO2 SERPL-SCNC: 19 MMOL/L (ref 22–29)
COHB: 0.7 % (ref 0–1.5)
CREAT SERPL-MCNC: 1.4 MG/DL (ref 0.7–1.2)
CRITICAL: ABNORMAL
DATE ANALYZED: ABNORMAL
DATE OF COLLECTION: ABNORMAL
EOSINOPHIL # BLD: 0.29 K/UL (ref 0.05–0.5)
EOSINOPHILS RELATIVE PERCENT: 3 % (ref 0–6)
ERYTHROCYTE [DISTWIDTH] IN BLOOD BY AUTOMATED COUNT: 15.7 % (ref 11.5–15)
GFR, ESTIMATED: 57 ML/MIN/1.73M2
GLUCOSE SERPL-MCNC: 131 MG/DL (ref 74–99)
HCO3: 22.6 MMOL/L (ref 22–26)
HCT VFR BLD AUTO: 34.5 % (ref 37–54)
HGB BLD-MCNC: 10.3 G/DL (ref 12.5–16.5)
HHB: 8.1 % (ref 0–5)
IMM GRANULOCYTES # BLD AUTO: 0.06 K/UL (ref 0–0.58)
IMM GRANULOCYTES NFR BLD: 1 % (ref 0–5)
LAB: ABNORMAL
LYMPHOCYTES NFR BLD: 1.46 K/UL (ref 1.5–4)
LYMPHOCYTES RELATIVE PERCENT: 16 % (ref 20–42)
Lab: 1445
MAGNESIUM SERPL-MCNC: 2.1 MG/DL (ref 1.6–2.6)
MCH RBC QN AUTO: 32.7 PG (ref 26–35)
MCHC RBC AUTO-ENTMCNC: 29.9 G/DL (ref 32–34.5)
MCV RBC AUTO: 109.5 FL (ref 80–99.9)
METHB: 0.5 % (ref 0–1.5)
MODE: ABNORMAL
MONOCYTES NFR BLD: 1.27 K/UL (ref 0.1–0.95)
MONOCYTES NFR BLD: 14 % (ref 2–12)
NEUTROPHILS NFR BLD: 65 % (ref 43–80)
NEUTS SEG NFR BLD: 5.86 K/UL (ref 1.8–7.3)
O2 SATURATION: 93.8 % (ref 92–98.5)
O2HB: 90.7 % (ref 94–97)
OPERATOR ID: 5123
PATIENT TEMP: 37 C
PCO2: 31.9 MMHG (ref 35–45)
PH BLOOD GAS: 7.47 (ref 7.35–7.45)
PLATELET # BLD AUTO: 387 K/UL (ref 130–450)
PMV BLD AUTO: 9.5 FL (ref 7–12)
PO2: 63.8 MMHG (ref 75–100)
POTASSIUM SERPL-SCNC: 3.2 MMOL/L (ref 3.5–5)
RBC # BLD AUTO: 3.15 M/UL (ref 3.8–5.8)
SODIUM SERPL-SCNC: 160 MMOL/L (ref 132–146)
SODIUM SERPL-SCNC: 162 MMOL/L (ref 132–146)
SODIUM SERPL-SCNC: 165 MMOL/L (ref 132–146)
SODIUM SERPL-SCNC: 166 MMOL/L (ref 132–146)
SODIUM SERPL-SCNC: 168 MMOL/L (ref 132–146)
SOURCE, BLOOD GAS: ABNORMAL
THB: 11.6 G/DL (ref 11.5–16.5)
TIME ANALYZED: 1449
WBC OTHER # BLD: 9 K/UL (ref 4.5–11.5)

## 2024-06-05 PROCEDURE — 6360000002 HC RX W HCPCS: Performed by: STUDENT IN AN ORGANIZED HEALTH CARE EDUCATION/TRAINING PROGRAM

## 2024-06-05 PROCEDURE — 6370000000 HC RX 637 (ALT 250 FOR IP): Performed by: NURSE PRACTITIONER

## 2024-06-05 PROCEDURE — 2580000003 HC RX 258: Performed by: INTERNAL MEDICINE

## 2024-06-05 PROCEDURE — 71045 X-RAY EXAM CHEST 1 VIEW: CPT

## 2024-06-05 PROCEDURE — 85025 COMPLETE CBC W/AUTO DIFF WBC: CPT

## 2024-06-05 PROCEDURE — 84295 ASSAY OF SERUM SODIUM: CPT

## 2024-06-05 PROCEDURE — 2060000000 HC ICU INTERMEDIATE R&B

## 2024-06-05 PROCEDURE — 83735 ASSAY OF MAGNESIUM: CPT

## 2024-06-05 PROCEDURE — 6360000002 HC RX W HCPCS: Performed by: INTERNAL MEDICINE

## 2024-06-05 PROCEDURE — 80048 BASIC METABOLIC PNL TOTAL CA: CPT

## 2024-06-05 PROCEDURE — 36415 COLL VENOUS BLD VENIPUNCTURE: CPT

## 2024-06-05 PROCEDURE — 82805 BLOOD GASES W/O2 SATURATION: CPT

## 2024-06-05 PROCEDURE — 99232 SBSQ HOSP IP/OBS MODERATE 35: CPT | Performed by: INTERNAL MEDICINE

## 2024-06-05 PROCEDURE — 74018 RADEX ABDOMEN 1 VIEW: CPT

## 2024-06-05 RX ORDER — HEPARIN 100 UNIT/ML
1 SYRINGE INTRAVENOUS EVERY 12 HOURS SCHEDULED
Status: DISCONTINUED | OUTPATIENT
Start: 2024-06-05 | End: 2024-06-20 | Stop reason: HOSPADM

## 2024-06-05 RX ORDER — SODIUM CHLORIDE 9 MG/ML
INJECTION, SOLUTION INTRAVENOUS PRN
Status: DISCONTINUED | OUTPATIENT
Start: 2024-06-05 | End: 2024-06-18

## 2024-06-05 RX ORDER — HEPARIN 100 UNIT/ML
1 SYRINGE INTRAVENOUS PRN
Status: DISCONTINUED | OUTPATIENT
Start: 2024-06-05 | End: 2024-06-20 | Stop reason: HOSPADM

## 2024-06-05 RX ORDER — LORAZEPAM 2 MG/ML
0.5 INJECTION INTRAMUSCULAR ONCE
Status: COMPLETED | OUTPATIENT
Start: 2024-06-05 | End: 2024-06-05

## 2024-06-05 RX ORDER — SODIUM CHLORIDE 0.9 % (FLUSH) 0.9 %
5-40 SYRINGE (ML) INJECTION EVERY 12 HOURS SCHEDULED
Status: DISCONTINUED | OUTPATIENT
Start: 2024-06-05 | End: 2024-06-18

## 2024-06-05 RX ORDER — SODIUM CHLORIDE 0.9 % (FLUSH) 0.9 %
5-40 SYRINGE (ML) INJECTION PRN
Status: DISCONTINUED | OUTPATIENT
Start: 2024-06-05 | End: 2024-06-20 | Stop reason: HOSPADM

## 2024-06-05 RX ADMIN — DESMOPRESSIN ACETATE 2 MCG: 4 INJECTION, SOLUTION INTRAVENOUS; SUBCUTANEOUS at 10:30

## 2024-06-05 RX ADMIN — DIVALPROEX SODIUM 125 MG: 125 CAPSULE ORAL at 22:41

## 2024-06-05 RX ADMIN — OXYBUTYNIN CHLORIDE 5 MG: 5 TABLET ORAL at 22:40

## 2024-06-05 RX ADMIN — TAMSULOSIN HYDROCHLORIDE 0.4 MG: 0.4 CAPSULE ORAL at 22:40

## 2024-06-05 RX ADMIN — Medication 6 MG: at 22:41

## 2024-06-05 RX ADMIN — CARBIDOPA AND LEVODOPA 1 TABLET: 25; 100 TABLET ORAL at 22:41

## 2024-06-05 RX ADMIN — ATORVASTATIN CALCIUM 10 MG: 10 TABLET, FILM COATED ORAL at 22:40

## 2024-06-05 RX ADMIN — LORAZEPAM 0.5 MG: 2 INJECTION INTRAMUSCULAR; INTRAVENOUS at 02:20

## 2024-06-05 RX ADMIN — DESMOPRESSIN ACETATE 2 MCG: 4 INJECTION, SOLUTION INTRAVENOUS; SUBCUTANEOUS at 23:02

## 2024-06-05 RX ADMIN — PROPRANOLOL HYDROCHLORIDE 10 MG: 10 TABLET ORAL at 22:40

## 2024-06-05 RX ADMIN — DEXTROSE MONOHYDRATE: 50 INJECTION, SOLUTION INTRAVENOUS at 06:25

## 2024-06-05 RX ADMIN — APIXABAN 2.5 MG: 5 TABLET, FILM COATED ORAL at 22:40

## 2024-06-05 RX ADMIN — DEXTROSE MONOHYDRATE: 50 INJECTION, SOLUTION INTRAVENOUS at 23:08
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
## 2024-06-06 ENCOUNTER — APPOINTMENT (OUTPATIENT)
Dept: GENERAL RADIOLOGY | Age: 59
DRG: 004 | End: 2024-06-06
Payer: MEDICARE

## 2024-06-06 LAB
AADO2: 407 MMHG
ANION GAP SERPL CALCULATED.3IONS-SCNC: 11 MMOL/L (ref 7–16)
ANION GAP SERPL CALCULATED.3IONS-SCNC: 14 MMOL/L (ref 7–16)
B.E.: -1.5 MMOL/L (ref -3–3)
BUN SERPL-MCNC: 11 MG/DL (ref 6–23)
BUN SERPL-MCNC: 16 MG/DL (ref 6–23)
CALCIUM SERPL-MCNC: 8.8 MG/DL (ref 8.6–10.2)
CALCIUM SERPL-MCNC: 9.4 MG/DL (ref 8.6–10.2)
CHLORIDE SERPL-SCNC: 122 MMOL/L (ref 98–107)
CHLORIDE SERPL-SCNC: 123 MMOL/L (ref 98–107)
CO2 SERPL-SCNC: 19 MMOL/L (ref 22–29)
CO2 SERPL-SCNC: 20 MMOL/L (ref 22–29)
COHB: 0.1 % (ref 0–1.5)
CREAT SERPL-MCNC: 1.3 MG/DL (ref 0.7–1.2)
CREAT SERPL-MCNC: 1.5 MG/DL (ref 0.7–1.2)
CRITICAL: ABNORMAL
DATE ANALYZED: ABNORMAL
DATE OF COLLECTION: ABNORMAL
FIO2: 90 %
GFR, ESTIMATED: 54 ML/MIN/1.73M2
GFR, ESTIMATED: 64 ML/MIN/1.73M2
GLUCOSE SERPL-MCNC: 125 MG/DL (ref 74–99)
GLUCOSE SERPL-MCNC: 127 MG/DL (ref 74–99)
HCO3: 22.4 MMOL/L (ref 22–26)
HHB: 1.1 % (ref 0–5)
LAB: ABNORMAL
LACTATE BLDV-SCNC: 1.4 MMOL/L (ref 0.5–2.2)
Lab: 1742
MAGNESIUM SERPL-MCNC: 1.9 MG/DL (ref 1.6–2.6)
METHB: 0.7 % (ref 0–1.5)
MODE: AC
O2 SATURATION: 99.4 % (ref 92–98.5)
O2HB: 98.1 % (ref 94–97)
OPERATOR ID: 882
PATIENT TEMP: 37 C
PCO2: 35 MMHG (ref 35–45)
PEEP/CPAP: 8 CMH2O
PFO2: 2.21 MMHG/%
PH BLOOD GAS: 7.42 (ref 7.35–7.45)
PHOSPHATE SERPL-MCNC: 3.4 MG/DL (ref 2.5–4.5)
PO2: 198.8 MMHG (ref 75–100)
POTASSIUM SERPL-SCNC: 2.9 MMOL/L (ref 3.5–5)
POTASSIUM SERPL-SCNC: 3.2 MMOL/L (ref 3.5–5)
RI(T): 2.05
RR MECHANICAL: 16 B/MIN
SODIUM SERPL-SCNC: 152 MMOL/L (ref 132–146)
SODIUM SERPL-SCNC: 157 MMOL/L (ref 132–146)
SODIUM SERPL-SCNC: 158 MMOL/L (ref 132–146)
SODIUM SERPL-SCNC: 159 MMOL/L (ref 132–146)
SODIUM SERPL-SCNC: 163 MMOL/L (ref 132–146)
SOURCE, BLOOD GAS: ABNORMAL
THB: 11.6 G/DL (ref 11.5–16.5)
TIME ANALYZED: 1745
VT MECHANICAL: 430 ML

## 2024-06-06 PROCEDURE — 31645 BRNCHSC W/THER ASPIR 1ST: CPT | Performed by: INTERNAL MEDICINE

## 2024-06-06 PROCEDURE — 31500 INSERT EMERGENCY AIRWAY: CPT

## 2024-06-06 PROCEDURE — 99291 CRITICAL CARE FIRST HOUR: CPT

## 2024-06-06 PROCEDURE — 36415 COLL VENOUS BLD VENIPUNCTURE: CPT

## 2024-06-06 PROCEDURE — 83735 ASSAY OF MAGNESIUM: CPT

## 2024-06-06 PROCEDURE — 94002 VENT MGMT INPAT INIT DAY: CPT

## 2024-06-06 PROCEDURE — 6360000002 HC RX W HCPCS: Performed by: INTERNAL MEDICINE

## 2024-06-06 PROCEDURE — 83605 ASSAY OF LACTIC ACID: CPT

## 2024-06-06 PROCEDURE — 6360000002 HC RX W HCPCS: Performed by: NURSE PRACTITIONER

## 2024-06-06 PROCEDURE — 84295 ASSAY OF SERUM SODIUM: CPT

## 2024-06-06 PROCEDURE — 5A1955Z RESPIRATORY VENTILATION, GREATER THAN 96 CONSECUTIVE HOURS: ICD-10-PCS | Performed by: INTERNAL MEDICINE

## 2024-06-06 PROCEDURE — 86403 PARTICLE AGGLUT ANTBDY SCRN: CPT

## 2024-06-06 PROCEDURE — 6370000000 HC RX 637 (ALT 250 FOR IP): Performed by: NURSE PRACTITIONER

## 2024-06-06 PROCEDURE — 87070 CULTURE OTHR SPECIMN AEROBIC: CPT

## 2024-06-06 PROCEDURE — 87205 SMEAR GRAM STAIN: CPT

## 2024-06-06 PROCEDURE — 2580000003 HC RX 258: Performed by: STUDENT IN AN ORGANIZED HEALTH CARE EDUCATION/TRAINING PROGRAM

## 2024-06-06 PROCEDURE — 2000000000 HC ICU R&B

## 2024-06-06 PROCEDURE — 6360000002 HC RX W HCPCS

## 2024-06-06 PROCEDURE — 2580000003 HC RX 258: Performed by: INTERNAL MEDICINE

## 2024-06-06 PROCEDURE — 0B9M8ZZ DRAINAGE OF BILATERAL LUNGS, VIA NATURAL OR ARTIFICIAL OPENING ENDOSCOPIC: ICD-10-PCS | Performed by: INTERNAL MEDICINE

## 2024-06-06 PROCEDURE — 97530 THERAPEUTIC ACTIVITIES: CPT

## 2024-06-06 PROCEDURE — 97165 OT EVAL LOW COMPLEX 30 MIN: CPT

## 2024-06-06 PROCEDURE — 2720000010 HC SURG SUPPLY STERILE

## 2024-06-06 PROCEDURE — 99233 SBSQ HOSP IP/OBS HIGH 50: CPT | Performed by: INTERNAL MEDICINE

## 2024-06-06 PROCEDURE — 51798 US URINE CAPACITY MEASURE: CPT

## 2024-06-06 PROCEDURE — 80048 BASIC METABOLIC PNL TOTAL CA: CPT

## 2024-06-06 PROCEDURE — 84100 ASSAY OF PHOSPHORUS: CPT

## 2024-06-06 PROCEDURE — 97161 PT EVAL LOW COMPLEX 20 MIN: CPT

## 2024-06-06 PROCEDURE — 31646 BRNCHSC W/THER ASPIR SBSQ: CPT

## 2024-06-06 PROCEDURE — 2580000003 HC RX 258: Performed by: NURSE PRACTITIONER

## 2024-06-06 PROCEDURE — 31500 INSERT EMERGENCY AIRWAY: CPT | Performed by: INTERNAL MEDICINE

## 2024-06-06 PROCEDURE — 0BH17EZ INSERTION OF ENDOTRACHEAL AIRWAY INTO TRACHEA, VIA NATURAL OR ARTIFICIAL OPENING: ICD-10-PCS | Performed by: INTERNAL MEDICINE

## 2024-06-06 PROCEDURE — 82805 BLOOD GASES W/O2 SATURATION: CPT

## 2024-06-06 PROCEDURE — 71045 X-RAY EXAM CHEST 1 VIEW: CPT

## 2024-06-06 RX ORDER — ROCURONIUM BROMIDE 10 MG/ML
INJECTION, SOLUTION INTRAVENOUS
Status: DISCONTINUED
Start: 2024-06-06 | End: 2024-06-06 | Stop reason: WASHOUT

## 2024-06-06 RX ORDER — PROPOFOL 10 MG/ML
5-50 INJECTION, EMULSION INTRAVENOUS CONTINUOUS
Status: DISCONTINUED | OUTPATIENT
Start: 2024-06-06 | End: 2024-06-18

## 2024-06-06 RX ORDER — ETOMIDATE 2 MG/ML
INJECTION INTRAVENOUS
Status: DISCONTINUED
Start: 2024-06-06 | End: 2024-06-06 | Stop reason: WASHOUT

## 2024-06-06 RX ORDER — PROPOFOL 10 MG/ML
INJECTION, EMULSION INTRAVENOUS
Status: COMPLETED
Start: 2024-06-06 | End: 2024-06-06

## 2024-06-06 RX ADMIN — PROPOFOL 20 MCG/KG/MIN: 10 INJECTION, EMULSION INTRAVENOUS at 18:19

## 2024-06-06 RX ADMIN — FINASTERIDE 5 MG: 5 TABLET, FILM COATED ORAL at 09:52

## 2024-06-06 RX ADMIN — POTASSIUM BICARBONATE 40 MEQ: 782 TABLET, EFFERVESCENT ORAL at 21:36

## 2024-06-06 RX ADMIN — OXYBUTYNIN CHLORIDE 5 MG: 5 TABLET ORAL at 21:02

## 2024-06-06 RX ADMIN — PROPRANOLOL HYDROCHLORIDE 10 MG: 10 TABLET ORAL at 09:51

## 2024-06-06 RX ADMIN — PROPOFOL 1000 MG: 10 INJECTION, EMULSION INTRAVENOUS at 14:47

## 2024-06-06 RX ADMIN — CARBIDOPA AND LEVODOPA 1 TABLET: 25; 100 TABLET ORAL at 09:51

## 2024-06-06 RX ADMIN — MEDROXYPROGESTERONE ACETATE 10 MG: 10 TABLET ORAL at 09:51

## 2024-06-06 RX ADMIN — POTASSIUM CHLORIDE 10 MEQ: 7.46 INJECTION, SOLUTION INTRAVENOUS at 12:23

## 2024-06-06 RX ADMIN — DEXTROSE MONOHYDRATE: 50 INJECTION, SOLUTION INTRAVENOUS at 19:52

## 2024-06-06 RX ADMIN — APIXABAN 2.5 MG: 5 TABLET, FILM COATED ORAL at 09:52

## 2024-06-06 RX ADMIN — AMLODIPINE BESYLATE 2.5 MG: 5 TABLET ORAL at 09:52

## 2024-06-06 RX ADMIN — DIVALPROEX SODIUM 125 MG: 125 CAPSULE ORAL at 21:03

## 2024-06-06 RX ADMIN — SODIUM CHLORIDE, PRESERVATIVE FREE 10 ML: 5 INJECTION INTRAVENOUS at 09:53

## 2024-06-06 RX ADMIN — AZATHIOPRINE 150 MG: 50 TABLET ORAL at 09:51

## 2024-06-06 RX ADMIN — APIXABAN 2.5 MG: 5 TABLET, FILM COATED ORAL at 20:12

## 2024-06-06 RX ADMIN — FAMOTIDINE 20 MG: 20 TABLET, FILM COATED ORAL at 09:52

## 2024-06-06 RX ADMIN — ATORVASTATIN CALCIUM 10 MG: 10 TABLET, FILM COATED ORAL at 20:12

## 2024-06-06 RX ADMIN — OXYBUTYNIN CHLORIDE 5 MG: 5 TABLET ORAL at 09:52

## 2024-06-06 RX ADMIN — PANTOPRAZOLE SODIUM 40 MG: 40 TABLET, DELAYED RELEASE ORAL at 09:51

## 2024-06-06 RX ADMIN — DESMOPRESSIN ACETATE 2 MCG: 4 INJECTION, SOLUTION INTRAVENOUS; SUBCUTANEOUS at 09:50

## 2024-06-06 RX ADMIN — CARBIDOPA AND LEVODOPA 1 TABLET: 25; 100 TABLET ORAL at 20:12

## 2024-06-06 RX ADMIN — DIVALPROEX SODIUM 125 MG: 125 CAPSULE ORAL at 09:51

## 2024-06-06 ASSESSMENT — PULMONARY FUNCTION TESTS
PIF_VALUE: 21
PIF_VALUE: 27
PIF_VALUE: 17
PIF_VALUE: 23
PIF_VALUE: 21
PIF_VALUE: 20
PIF_VALUE: 21
PIF_VALUE: 22
PIF_VALUE: 20
PIF_VALUE: 22
PIF_VALUE: 20
PIF_VALUE: 21
PIF_VALUE: 23
PIF_VALUE: 20
PIF_VALUE: 20
PIF_VALUE: 31

## 2024-06-06 ASSESSMENT — PAIN SCALES - GENERAL
PAINLEVEL_OUTOF10: 0
PAINLEVEL_OUTOF10: 6
PAINLEVEL_OUTOF10: 0
PAINLEVEL_OUTOF10: 0

## 2024-06-06 ASSESSMENT — PAIN SCALES - WONG BAKER: WONGBAKER_NUMERICALRESPONSE: HURTS EVEN MORE

## 2024-06-06 NOTE — CARE COORDINATION
Case discussed with Gabi baker today  From [facility] - for AMS ( He is Transitioning male to female Jose G to [place] ).Has history of myasthenia gravis, who presents for altered mental status. 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 Called and spoke with [facility] @ [facility] yesterday -  will need to be new referral and clinicals will need to be faxed When medically ready. [facility] and she faxed power of  paperwork that she has and he has a son Krish is his healthcare agent so I attempted to reach out to son at # listed and left  for return call. Also called His brother Jayson and asked if pt had children and he verified that he has a son Krish who is also  transitioning to female) not sure if he is involved. If unable to go back to [facility] [place]. Plan to be determined as he is being transferred to ICU. Envelope and ambulance form in soft chart for [facility].Electronically signed by Annabella Arguello RN on 6/6/2024 at 1:46 PM    His address is [id number] [place] [address] [address] [place] [id number]. Sent email escalation.Electronically signed by Annabella Arguello RN on 6/6/2024 at 3:42 PM

## 2024-06-07 ENCOUNTER — APPOINTMENT (OUTPATIENT)
Dept: GENERAL RADIOLOGY | Age: 59
DRG: 004 | End: 2024-06-07
Payer: MEDICARE

## 2024-06-07 ENCOUNTER — APPOINTMENT (OUTPATIENT)
Dept: NEUROLOGY | Age: 59
DRG: 004 | End: 2024-06-07
Payer: MEDICARE

## 2024-06-07 PROBLEM — J96.00 ACUTE RESPIRATORY FAILURE (HCC): Status: ACTIVE | Noted: 2024-06-07

## 2024-06-07 LAB
AADO2: 134.6 MMHG
AADO2: 358.3 MMHG
ALBUMIN SERPL-MCNC: 2.7 G/DL (ref 3.5–5.2)
ALP SERPL-CCNC: 55 U/L (ref 40–129)
ALT SERPL-CCNC: 6 U/L (ref 0–40)
ANION GAP SERPL CALCULATED.3IONS-SCNC: 10 MMOL/L (ref 7–16)
ANION GAP SERPL CALCULATED.3IONS-SCNC: 10 MMOL/L (ref 7–16)
ANION GAP SERPL CALCULATED.3IONS-SCNC: 11 MMOL/L (ref 7–16)
ANION GAP SERPL CALCULATED.3IONS-SCNC: 14 MMOL/L (ref 7–16)
AST SERPL-CCNC: 23 U/L (ref 0–39)
B.E.: -0.9 MMOL/L (ref -3–3)
B.E.: 1 MMOL/L (ref -3–3)
BILIRUB SERPL-MCNC: 0.6 MG/DL (ref 0–1.2)
BUN SERPL-MCNC: 23 MG/DL (ref 6–23)
BUN SERPL-MCNC: 27 MG/DL (ref 6–23)
BUN SERPL-MCNC: 28 MG/DL (ref 6–23)
BUN SERPL-MCNC: 28 MG/DL (ref 6–23)
CALCIUM SERPL-MCNC: 8.3 MG/DL (ref 8.6–10.2)
CALCIUM SERPL-MCNC: 8.5 MG/DL (ref 8.6–10.2)
CHLORIDE SERPL-SCNC: 110 MMOL/L (ref 98–107)
CHLORIDE SERPL-SCNC: 111 MMOL/L (ref 98–107)
CHLORIDE SERPL-SCNC: 111 MMOL/L (ref 98–107)
CHLORIDE SERPL-SCNC: 115 MMOL/L (ref 98–107)
CO2 SERPL-SCNC: 19 MMOL/L (ref 22–29)
CO2 SERPL-SCNC: 22 MMOL/L (ref 22–29)
CO2 SERPL-SCNC: 22 MMOL/L (ref 22–29)
CO2 SERPL-SCNC: 24 MMOL/L (ref 22–29)
COHB: 0.3 % (ref 0–1.5)
COHB: 0.3 % (ref 0–1.5)
CREAT SERPL-MCNC: 1.6 MG/DL (ref 0.7–1.2)
CREAT SERPL-MCNC: 1.6 MG/DL (ref 0.7–1.2)
CREAT SERPL-MCNC: 1.7 MG/DL (ref 0.7–1.2)
CREAT SERPL-MCNC: 1.7 MG/DL (ref 0.7–1.2)
CREAT UR-MCNC: 205.5 MG/DL (ref 40–278)
CRITICAL: ABNORMAL
CRITICAL: ABNORMAL
DATE ANALYZED: ABNORMAL
DATE ANALYZED: ABNORMAL
DATE OF COLLECTION: ABNORMAL
DATE OF COLLECTION: ABNORMAL
EKG ATRIAL RATE: 80 BPM
EKG P AXIS: -12 DEGREES
EKG P-R INTERVAL: 102 MS
EKG Q-T INTERVAL: 314 MS
EKG QRS DURATION: 92 MS
EKG QTC CALCULATION (BAZETT): 362 MS
EKG R AXIS: -12 DEGREES
EKG T AXIS: 63 DEGREES
EKG VENTRICULAR RATE: 80 BPM
ERYTHROCYTE [DISTWIDTH] IN BLOOD BY AUTOMATED COUNT: 14.6 % (ref 11.5–15)
ERYTHROCYTE [SEDIMENTATION RATE] IN BLOOD BY WESTERGREN METHOD: 89 MM/HR (ref 0–15)
FERRITIN SERPL-MCNC: 439 NG/ML
FIO2: 40 %
FIO2: 80 %
FOLATE SERPL-MCNC: 4 NG/ML (ref 4.8–24.2)
GFR, ESTIMATED: 46 ML/MIN/1.73M2
GFR, ESTIMATED: 47 ML/MIN/1.73M2
GFR, ESTIMATED: 47 ML/MIN/1.73M2
GFR, ESTIMATED: 50 ML/MIN/1.73M2
GLUCOSE SERPL-MCNC: 131 MG/DL (ref 74–99)
GLUCOSE SERPL-MCNC: 139 MG/DL (ref 74–99)
GLUCOSE SERPL-MCNC: 143 MG/DL (ref 74–99)
GLUCOSE SERPL-MCNC: 144 MG/DL (ref 74–99)
HCO3: 22.4 MMOL/L (ref 22–26)
HCO3: 24.2 MMOL/L (ref 22–26)
HCT VFR BLD AUTO: 28.8 % (ref 37–54)
HGB BLD-MCNC: 9.2 G/DL (ref 12.5–16.5)
HHB: 1.1 % (ref 0–5)
HHB: 2.3 % (ref 0–5)
LAB: ABNORMAL
LAB: ABNORMAL
Lab: 1232
Lab: 449
MAGNESIUM SERPL-MCNC: 1.9 MG/DL (ref 1.6–2.6)
MCH RBC QN AUTO: 33.1 PG (ref 26–35)
MCHC RBC AUTO-ENTMCNC: 31.9 G/DL (ref 32–34.5)
MCV RBC AUTO: 103.6 FL (ref 80–99.9)
METHB: 0.6 % (ref 0–1.5)
METHB: 0.6 % (ref 0–1.5)
MICROORGANISM SPEC CULT: NORMAL
MICROORGANISM SPEC CULT: NORMAL
MODE: AC
MODE: AC
O2 SATURATION: 98.4 % (ref 92–98.5)
O2 SATURATION: 99.3 % (ref 92–98.5)
O2HB: 96.8 % (ref 94–97)
O2HB: 98 % (ref 94–97)
OPERATOR ID: 7221
OPERATOR ID: 882
OSMOLALITY UR: 430 MOSM/KG (ref 300–900)
PATIENT TEMP: 37 C
PATIENT TEMP: 37 C
PCO2: 32.2 MMHG (ref 35–45)
PCO2: 32.8 MMHG (ref 35–45)
PEEP/CPAP: 8 CMH2O
PEEP/CPAP: 8 CMH2O
PFO2: 2.23 MMHG/%
PFO2: 2.82 MMHG/%
PH BLOOD GAS: 7.46 (ref 7.35–7.45)
PH BLOOD GAS: 7.49 (ref 7.35–7.45)
PHOSPHATE SERPL-MCNC: 2.8 MG/DL (ref 2.5–4.5)
PLATELET # BLD AUTO: 290 K/UL (ref 130–450)
PMV BLD AUTO: 9.6 FL (ref 7–12)
PO2: 112.9 MMHG (ref 75–100)
PO2: 178.3 MMHG (ref 75–100)
POTASSIUM SERPL-SCNC: 3.5 MMOL/L (ref 3.5–5)
POTASSIUM SERPL-SCNC: 3.5 MMOL/L (ref 3.5–5)
POTASSIUM SERPL-SCNC: 3.6 MMOL/L (ref 3.5–5)
POTASSIUM SERPL-SCNC: 3.8 MMOL/L (ref 3.5–5)
PROT SERPL-MCNC: 5.1 G/DL (ref 6.4–8.3)
RBC # BLD AUTO: 2.78 M/UL (ref 3.8–5.8)
RI(T): 1.19
RI(T): 2.01
RR MECHANICAL: 16 B/MIN
RR MECHANICAL: 18 B/MIN
SERVICE CMNT-IMP: NORMAL
SERVICE CMNT-IMP: NORMAL
SODIUM SERPL-SCNC: 142 MMOL/L (ref 132–146)
SODIUM SERPL-SCNC: 143 MMOL/L (ref 132–146)
SODIUM SERPL-SCNC: 146 MMOL/L (ref 132–146)
SODIUM SERPL-SCNC: 148 MMOL/L (ref 132–146)
SODIUM UR-SCNC: 24 MMOL/L
SOURCE, BLOOD GAS: ABNORMAL
SOURCE, BLOOD GAS: ABNORMAL
SPECIMEN DESCRIPTION: NORMAL
SPECIMEN DESCRIPTION: NORMAL
THB: 9.9 G/DL (ref 11.5–16.5)
THB: 9.9 G/DL (ref 11.5–16.5)
TIME ANALYZED: 1239
TIME ANALYZED: 450
VIT B12 SERPL-MCNC: 1327 PG/ML (ref 211–946)
VT MECHANICAL: 430 ML
VT MECHANICAL: 500 ML
WBC OTHER # BLD: 11.9 K/UL (ref 4.5–11.5)

## 2024-06-07 PROCEDURE — 71045 X-RAY EXAM CHEST 1 VIEW: CPT

## 2024-06-07 PROCEDURE — 99233 SBSQ HOSP IP/OBS HIGH 50: CPT | Performed by: NURSE PRACTITIONER

## 2024-06-07 PROCEDURE — 2580000003 HC RX 258

## 2024-06-07 PROCEDURE — 6360000002 HC RX W HCPCS: Performed by: STUDENT IN AN ORGANIZED HEALTH CARE EDUCATION/TRAINING PROGRAM

## 2024-06-07 PROCEDURE — 82805 BLOOD GASES W/O2 SATURATION: CPT

## 2024-06-07 PROCEDURE — 83935 ASSAY OF URINE OSMOLALITY: CPT

## 2024-06-07 PROCEDURE — 86041 ACETYLCHOLN RCPTR BNDNG ANTB: CPT

## 2024-06-07 PROCEDURE — 86039 ANTINUCLEAR ANTIBODIES (ANA): CPT

## 2024-06-07 PROCEDURE — 93010 ELECTROCARDIOGRAM REPORT: CPT | Performed by: INTERNAL MEDICINE

## 2024-06-07 PROCEDURE — 2580000003 HC RX 258: Performed by: STUDENT IN AN ORGANIZED HEALTH CARE EDUCATION/TRAINING PROGRAM

## 2024-06-07 PROCEDURE — C9113 INJ PANTOPRAZOLE SODIUM, VIA: HCPCS

## 2024-06-07 PROCEDURE — 02HV33Z INSERTION OF INFUSION DEVICE INTO SUPERIOR VENA CAVA, PERCUTANEOUS APPROACH: ICD-10-PCS | Performed by: INTERNAL MEDICINE

## 2024-06-07 PROCEDURE — 80048 BASIC METABOLIC PNL TOTAL CA: CPT

## 2024-06-07 PROCEDURE — 51702 INSERT TEMP BLADDER CATH: CPT

## 2024-06-07 PROCEDURE — 82570 ASSAY OF URINE CREATININE: CPT

## 2024-06-07 PROCEDURE — 2000000000 HC ICU R&B

## 2024-06-07 PROCEDURE — 93005 ELECTROCARDIOGRAM TRACING: CPT

## 2024-06-07 PROCEDURE — 95822 EEG COMA OR SLEEP ONLY: CPT | Performed by: PSYCHIATRY & NEUROLOGY

## 2024-06-07 PROCEDURE — 86038 ANTINUCLEAR ANTIBODIES: CPT

## 2024-06-07 PROCEDURE — 85652 RBC SED RATE AUTOMATED: CPT

## 2024-06-07 PROCEDURE — 6360000002 HC RX W HCPCS: Performed by: NURSE PRACTITIONER

## 2024-06-07 PROCEDURE — 80053 COMPREHEN METABOLIC PANEL: CPT

## 2024-06-07 PROCEDURE — 6370000000 HC RX 637 (ALT 250 FOR IP): Performed by: NURSE PRACTITIONER

## 2024-06-07 PROCEDURE — 6370000000 HC RX 637 (ALT 250 FOR IP)

## 2024-06-07 PROCEDURE — 86042 ACETYLCHOLN RCPTR BLCKG ANTB: CPT

## 2024-06-07 PROCEDURE — 94003 VENT MGMT INPAT SUBQ DAY: CPT

## 2024-06-07 PROCEDURE — 36556 INSERT NON-TUNNEL CV CATH: CPT

## 2024-06-07 PROCEDURE — 51798 US URINE CAPACITY MEASURE: CPT

## 2024-06-07 PROCEDURE — 99291 CRITICAL CARE FIRST HOUR: CPT | Performed by: INTERNAL MEDICINE

## 2024-06-07 PROCEDURE — 84300 ASSAY OF URINE SODIUM: CPT

## 2024-06-07 PROCEDURE — 6360000002 HC RX W HCPCS

## 2024-06-07 PROCEDURE — 82746 ASSAY OF FOLIC ACID SERUM: CPT

## 2024-06-07 PROCEDURE — 95822 EEG COMA OR SLEEP ONLY: CPT

## 2024-06-07 PROCEDURE — 84100 ASSAY OF PHOSPHORUS: CPT

## 2024-06-07 PROCEDURE — A4216 STERILE WATER/SALINE, 10 ML: HCPCS

## 2024-06-07 PROCEDURE — 82728 ASSAY OF FERRITIN: CPT

## 2024-06-07 PROCEDURE — 85027 COMPLETE CBC AUTOMATED: CPT

## 2024-06-07 PROCEDURE — 83735 ASSAY OF MAGNESIUM: CPT

## 2024-06-07 PROCEDURE — 2580000003 HC RX 258: Performed by: INTERNAL MEDICINE

## 2024-06-07 PROCEDURE — 6360000002 HC RX W HCPCS: Performed by: INTERNAL MEDICINE

## 2024-06-07 PROCEDURE — 99232 SBSQ HOSP IP/OBS MODERATE 35: CPT | Performed by: INTERNAL MEDICINE

## 2024-06-07 PROCEDURE — 82607 VITAMIN B-12: CPT

## 2024-06-07 PROCEDURE — 86043 ACETYLCHOLN RCPTR MODLG ANTB: CPT

## 2024-06-07 RX ORDER — CHLORHEXIDINE GLUCONATE ORAL RINSE 1.2 MG/ML
15 SOLUTION DENTAL 2 TIMES DAILY
Status: DISCONTINUED | OUTPATIENT
Start: 2024-06-07 | End: 2024-06-20 | Stop reason: HOSPADM

## 2024-06-07 RX ORDER — IPRATROPIUM BROMIDE AND ALBUTEROL SULFATE 2.5; .5 MG/3ML; MG/3ML
1 SOLUTION RESPIRATORY (INHALATION) EVERY 4 HOURS PRN
Status: DISCONTINUED | OUTPATIENT
Start: 2024-06-07 | End: 2024-06-20 | Stop reason: HOSPADM

## 2024-06-07 RX ADMIN — CARBIDOPA AND LEVODOPA 1 TABLET: 25; 100 TABLET ORAL at 19:34

## 2024-06-07 RX ADMIN — ATORVASTATIN CALCIUM 10 MG: 10 TABLET, FILM COATED ORAL at 19:32

## 2024-06-07 RX ADMIN — POTASSIUM BICARBONATE 40 MEQ: 782 TABLET, EFFERVESCENT ORAL at 14:06

## 2024-06-07 RX ADMIN — POLYVINYL ALCOHOL, POVIDONE 2 DROP: 14; 6 SOLUTION/ DROPS OPHTHALMIC at 18:20

## 2024-06-07 RX ADMIN — OXYBUTYNIN CHLORIDE 5 MG: 5 TABLET ORAL at 08:47

## 2024-06-07 RX ADMIN — SODIUM CHLORIDE, PRESERVATIVE FREE 40 MG: 5 INJECTION INTRAVENOUS at 08:48

## 2024-06-07 RX ADMIN — PROPOFOL 40 MCG/KG/MIN: 10 INJECTION, EMULSION INTRAVENOUS at 22:40

## 2024-06-07 RX ADMIN — CHLORHEXIDINE GLUCONATE, 0.12% ORAL RINSE 15 ML: 1.2 SOLUTION DENTAL at 08:49

## 2024-06-07 RX ADMIN — PROPOFOL 35 MCG/KG/MIN: 10 INJECTION, EMULSION INTRAVENOUS at 02:16

## 2024-06-07 RX ADMIN — DEXTROSE MONOHYDRATE: 50 INJECTION, SOLUTION INTRAVENOUS at 04:20

## 2024-06-07 RX ADMIN — SODIUM CHLORIDE, PRESERVATIVE FREE 10 ML: 5 INJECTION INTRAVENOUS at 19:34

## 2024-06-07 RX ADMIN — POLYVINYL ALCOHOL, POVIDONE 2 DROP: 14; 6 SOLUTION/ DROPS OPHTHALMIC at 22:39

## 2024-06-07 RX ADMIN — DIVALPROEX SODIUM 125 MG: 125 CAPSULE ORAL at 08:47

## 2024-06-07 RX ADMIN — CHLORHEXIDINE GLUCONATE, 0.12% ORAL RINSE 15 ML: 1.2 SOLUTION DENTAL at 19:32

## 2024-06-07 RX ADMIN — CARBIDOPA AND LEVODOPA 1 TABLET: 25; 100 TABLET ORAL at 08:56

## 2024-06-07 RX ADMIN — PROPOFOL 40 MCG/KG/MIN: 10 INJECTION, EMULSION INTRAVENOUS at 19:30

## 2024-06-07 RX ADMIN — SODIUM CHLORIDE, PRESERVATIVE FREE 10 ML: 5 INJECTION INTRAVENOUS at 08:49

## 2024-06-07 RX ADMIN — CEFEPIME 2000 MG: 2 INJECTION, POWDER, FOR SOLUTION INTRAVENOUS at 22:36

## 2024-06-07 RX ADMIN — MEDROXYPROGESTERONE ACETATE 10 MG: 10 TABLET ORAL at 08:47

## 2024-06-07 RX ADMIN — CARBIDOPA AND LEVODOPA 1 TABLET: 25; 100 TABLET ORAL at 14:06

## 2024-06-07 RX ADMIN — FINASTERIDE 5 MG: 5 TABLET, FILM COATED ORAL at 08:46

## 2024-06-07 RX ADMIN — POTASSIUM BICARBONATE 40 MEQ: 782 TABLET, EFFERVESCENT ORAL at 06:25

## 2024-06-07 RX ADMIN — VANCOMYCIN HYDROCHLORIDE 2250 MG: 10 INJECTION, POWDER, LYOPHILIZED, FOR SOLUTION INTRAVENOUS at 17:03

## 2024-06-07 RX ADMIN — APIXABAN 2.5 MG: 5 TABLET, FILM COATED ORAL at 08:46

## 2024-06-07 RX ADMIN — OXYBUTYNIN CHLORIDE 5 MG: 5 TABLET ORAL at 19:31

## 2024-06-07 RX ADMIN — AZATHIOPRINE 150 MG: 50 TABLET ORAL at 08:47

## 2024-06-07 RX ADMIN — CEFEPIME 2000 MG: 2 INJECTION, POWDER, FOR SOLUTION INTRAVENOUS at 11:32

## 2024-06-07 RX ADMIN — PROPOFOL 45 MCG/KG/MIN: 10 INJECTION, EMULSION INTRAVENOUS at 09:13

## 2024-06-07 RX ADMIN — PROPOFOL 40 MCG/KG/MIN: 10 INJECTION, EMULSION INTRAVENOUS at 05:33

## 2024-06-07 RX ADMIN — DIVALPROEX SODIUM 125 MG: 125 CAPSULE ORAL at 19:31

## 2024-06-07 RX ADMIN — APIXABAN 2.5 MG: 5 TABLET, FILM COATED ORAL at 19:32

## 2024-06-07 RX ADMIN — PROPOFOL 35 MCG/KG/MIN: 10 INJECTION, EMULSION INTRAVENOUS at 16:18

## 2024-06-07 RX ADMIN — CHLORHEXIDINE GLUCONATE, 0.12% ORAL RINSE 15 ML: 1.2 SOLUTION DENTAL at 02:18

## 2024-06-07 ASSESSMENT — PAIN SCALES - WONG BAKER
WONGBAKER_NUMERICALRESPONSE: HURTS EVEN MORE

## 2024-06-07 ASSESSMENT — PULMONARY FUNCTION TESTS
PIF_VALUE: 30
PIF_VALUE: 24
PIF_VALUE: 16
PIF_VALUE: 23
PIF_VALUE: 21
PIF_VALUE: 21
PIF_VALUE: 22
PIF_VALUE: 32
PIF_VALUE: 22
PIF_VALUE: 21
PIF_VALUE: 19
PIF_VALUE: 22
PIF_VALUE: 20
PIF_VALUE: 22
PIF_VALUE: 23
PIF_VALUE: 22
PIF_VALUE: 19
PIF_VALUE: 22
PIF_VALUE: 24
PIF_VALUE: 33
PIF_VALUE: 21
PIF_VALUE: 21
PIF_VALUE: 22
PIF_VALUE: 20
PIF_VALUE: 17
PIF_VALUE: 26
PIF_VALUE: 20
PIF_VALUE: 21
PIF_VALUE: 21
PIF_VALUE: 24
PIF_VALUE: 20

## 2024-06-07 ASSESSMENT — PAIN SCALES - GENERAL
PAINLEVEL_OUTOF10: 6
PAINLEVEL_OUTOF10: 7
PAINLEVEL_OUTOF10: 0
PAINLEVEL_OUTOF10: 0
PAINLEVEL_OUTOF10: 7
PAINLEVEL_OUTOF10: 0
PAINLEVEL_OUTOF10: 0
PAINLEVEL_OUTOF10: 7

## 2024-06-07 NOTE — CARE COORDINATION
Care Coordination: LOS 5-day   Transfer from , Intubated, propofol, Cefepime, NGT/TF. Acute resp failure requiring emergent intubation, diffuse bilateral pneumonia, acute toxic metabolic encephalopathy.   Per Prev CM, pt was from [facility], adult unit for suicide attempt.  Per Brother Jayson, would like pt to return to [facility]- will need new referral, otherwise [facility] as [facility], clinicals submitted and CM spoke to [facility] to confirm service connection.  Pt identifies as female.  Transition of care needs unclear at this time, will follow.    Electronically signed by Mary Mccallum RN on 6/7/2024 at 3:31 PM

## 2024-06-08 ENCOUNTER — APPOINTMENT (OUTPATIENT)
Dept: GENERAL RADIOLOGY | Age: 59
DRG: 004 | End: 2024-06-08
Payer: MEDICARE

## 2024-06-08 LAB
AADO2: 176.5 MMHG
ALBUMIN SERPL-MCNC: 2.9 G/DL (ref 3.5–5.2)
ALP SERPL-CCNC: 77 U/L (ref 40–129)
ALT SERPL-CCNC: 6 U/L (ref 0–40)
ANION GAP SERPL CALCULATED.3IONS-SCNC: 10 MMOL/L (ref 7–16)
ANION GAP SERPL CALCULATED.3IONS-SCNC: 11 MMOL/L (ref 7–16)
ANION GAP SERPL CALCULATED.3IONS-SCNC: 11 MMOL/L (ref 7–16)
ANION GAP SERPL CALCULATED.3IONS-SCNC: 9 MMOL/L (ref 7–16)
AST SERPL-CCNC: 26 U/L (ref 0–39)
B.E.: -1.1 MMOL/L (ref -3–3)
BILIRUB SERPL-MCNC: 0.4 MG/DL (ref 0–1.2)
BUN SERPL-MCNC: 23 MG/DL (ref 6–23)
BUN SERPL-MCNC: 23 MG/DL (ref 6–23)
BUN SERPL-MCNC: 24 MG/DL (ref 6–23)
BUN SERPL-MCNC: 27 MG/DL (ref 6–23)
CALCIUM SERPL-MCNC: 8.6 MG/DL (ref 8.6–10.2)
CALCIUM SERPL-MCNC: 8.6 MG/DL (ref 8.6–10.2)
CALCIUM SERPL-MCNC: 8.7 MG/DL (ref 8.6–10.2)
CALCIUM SERPL-MCNC: 9 MG/DL (ref 8.6–10.2)
CHLORIDE SERPL-SCNC: 115 MMOL/L (ref 98–107)
CHLORIDE SERPL-SCNC: 117 MMOL/L (ref 98–107)
CHLORIDE SERPL-SCNC: 117 MMOL/L (ref 98–107)
CHLORIDE SERPL-SCNC: 119 MMOL/L (ref 98–107)
CO2 SERPL-SCNC: 22 MMOL/L (ref 22–29)
CO2 SERPL-SCNC: 24 MMOL/L (ref 22–29)
COHB: 0.1 % (ref 0–1.5)
CREAT SERPL-MCNC: 1.1 MG/DL (ref 0.7–1.2)
CREAT SERPL-MCNC: 1.2 MG/DL (ref 0.7–1.2)
CREAT SERPL-MCNC: 1.3 MG/DL (ref 0.7–1.2)
CREAT SERPL-MCNC: 1.4 MG/DL (ref 0.7–1.2)
CREAT UR-MCNC: 33.3 MG/DL (ref 40–278)
CRITICAL: ABNORMAL
DATE ANALYZED: ABNORMAL
DATE OF COLLECTION: ABNORMAL
ERYTHROCYTE [DISTWIDTH] IN BLOOD BY AUTOMATED COUNT: 14.5 % (ref 11.5–15)
FIO2: 40 %
GFR, ESTIMATED: 57 ML/MIN/1.73M2
GFR, ESTIMATED: 65 ML/MIN/1.73M2
GFR, ESTIMATED: 70 ML/MIN/1.73M2
GFR, ESTIMATED: 75 ML/MIN/1.73M2
GLUCOSE SERPL-MCNC: 157 MG/DL (ref 74–99)
GLUCOSE SERPL-MCNC: 164 MG/DL (ref 74–99)
GLUCOSE SERPL-MCNC: 172 MG/DL (ref 74–99)
GLUCOSE SERPL-MCNC: 183 MG/DL (ref 74–99)
HCO3: 21.5 MMOL/L (ref 22–26)
HCT VFR BLD AUTO: 28.5 % (ref 37–54)
HGB BLD-MCNC: 9 G/DL (ref 12.5–16.5)
HHB: 5.2 % (ref 0–5)
LAB: ABNORMAL
Lab: 431
MAGNESIUM SERPL-MCNC: 2.3 MG/DL (ref 1.6–2.6)
MCH RBC QN AUTO: 33.1 PG (ref 26–35)
MCHC RBC AUTO-ENTMCNC: 31.6 G/DL (ref 32–34.5)
MCV RBC AUTO: 104.8 FL (ref 80–99.9)
METHB: 0.5 % (ref 0–1.5)
MICROORGANISM SPEC CULT: ABNORMAL
MICROORGANISM SPEC CULT: ABNORMAL
MICROORGANISM/AGENT SPEC: ABNORMAL
MODE: AC
O2 SATURATION: 96.2 % (ref 92–98.5)
O2HB: 94.2 % (ref 94–97)
OPERATOR ID: 3214
OSMOLALITY UR: 193 MOSM/KG (ref 300–900)
PATIENT TEMP: 37 C
PCO2: 29.1 MMHG (ref 35–45)
PEEP/CPAP: 8 CMH2O
PFO2: 1.88 MMHG/%
PH BLOOD GAS: 7.49 (ref 7.35–7.45)
PHOSPHATE SERPL-MCNC: 1.8 MG/DL (ref 2.5–4.5)
PLATELET # BLD AUTO: 272 K/UL (ref 130–450)
PMV BLD AUTO: 10.3 FL (ref 7–12)
PO2: 75.2 MMHG (ref 75–100)
POTASSIUM SERPL-SCNC: 3.4 MMOL/L (ref 3.5–5)
POTASSIUM SERPL-SCNC: 3.7 MMOL/L (ref 3.5–5)
POTASSIUM SERPL-SCNC: 3.7 MMOL/L (ref 3.5–5)
POTASSIUM SERPL-SCNC: 3.9 MMOL/L (ref 3.5–5)
PROT SERPL-MCNC: 5.6 G/DL (ref 6.4–8.3)
RBC # BLD AUTO: 2.72 M/UL (ref 3.8–5.8)
RI(T): 2.35
RR MECHANICAL: 18 B/MIN
SODIUM SERPL-SCNC: 149 MMOL/L (ref 132–146)
SODIUM SERPL-SCNC: 150 MMOL/L (ref 132–146)
SODIUM SERPL-SCNC: 152 MMOL/L (ref 132–146)
SODIUM SERPL-SCNC: 152 MMOL/L (ref 132–146)
SODIUM UR-SCNC: <20 MMOL/L
SOURCE, BLOOD GAS: ABNORMAL
SPECIMEN DESCRIPTION: ABNORMAL
THB: 10.4 G/DL (ref 11.5–16.5)
TIME ANALYZED: 455
TRIGL SERPL-MCNC: 193 MG/DL
VT MECHANICAL: 500 ML
WBC OTHER # BLD: 12.8 K/UL (ref 4.5–11.5)

## 2024-06-08 PROCEDURE — 6360000002 HC RX W HCPCS: Performed by: NURSE PRACTITIONER

## 2024-06-08 PROCEDURE — 6360000002 HC RX W HCPCS: Performed by: INTERNAL MEDICINE

## 2024-06-08 PROCEDURE — 80048 BASIC METABOLIC PNL TOTAL CA: CPT

## 2024-06-08 PROCEDURE — 99291 CRITICAL CARE FIRST HOUR: CPT | Performed by: INTERNAL MEDICINE

## 2024-06-08 PROCEDURE — 99232 SBSQ HOSP IP/OBS MODERATE 35: CPT | Performed by: INTERNAL MEDICINE

## 2024-06-08 PROCEDURE — 84100 ASSAY OF PHOSPHORUS: CPT

## 2024-06-08 PROCEDURE — 2580000003 HC RX 258: Performed by: STUDENT IN AN ORGANIZED HEALTH CARE EDUCATION/TRAINING PROGRAM

## 2024-06-08 PROCEDURE — 82805 BLOOD GASES W/O2 SATURATION: CPT

## 2024-06-08 PROCEDURE — 6370000000 HC RX 637 (ALT 250 FOR IP): Performed by: NURSE PRACTITIONER

## 2024-06-08 PROCEDURE — 84300 ASSAY OF URINE SODIUM: CPT

## 2024-06-08 PROCEDURE — A4216 STERILE WATER/SALINE, 10 ML: HCPCS

## 2024-06-08 PROCEDURE — 2500000003 HC RX 250 WO HCPCS

## 2024-06-08 PROCEDURE — 83735 ASSAY OF MAGNESIUM: CPT

## 2024-06-08 PROCEDURE — 2580000003 HC RX 258: Performed by: INTERNAL MEDICINE

## 2024-06-08 PROCEDURE — 94003 VENT MGMT INPAT SUBQ DAY: CPT

## 2024-06-08 PROCEDURE — C9113 INJ PANTOPRAZOLE SODIUM, VIA: HCPCS

## 2024-06-08 PROCEDURE — 6360000002 HC RX W HCPCS: Performed by: STUDENT IN AN ORGANIZED HEALTH CARE EDUCATION/TRAINING PROGRAM

## 2024-06-08 PROCEDURE — 2580000003 HC RX 258

## 2024-06-08 PROCEDURE — 83935 ASSAY OF URINE OSMOLALITY: CPT

## 2024-06-08 PROCEDURE — 71045 X-RAY EXAM CHEST 1 VIEW: CPT

## 2024-06-08 PROCEDURE — 6370000000 HC RX 637 (ALT 250 FOR IP)

## 2024-06-08 PROCEDURE — 80053 COMPREHEN METABOLIC PANEL: CPT

## 2024-06-08 PROCEDURE — 99233 SBSQ HOSP IP/OBS HIGH 50: CPT | Performed by: NURSE PRACTITIONER

## 2024-06-08 PROCEDURE — 2000000000 HC ICU R&B

## 2024-06-08 PROCEDURE — 6360000002 HC RX W HCPCS

## 2024-06-08 PROCEDURE — 82570 ASSAY OF URINE CREATININE: CPT

## 2024-06-08 PROCEDURE — 84478 ASSAY OF TRIGLYCERIDES: CPT

## 2024-06-08 PROCEDURE — 85027 COMPLETE CBC AUTOMATED: CPT

## 2024-06-08 RX ORDER — DESMOPRESSIN ACETATE 4 UG/ML
1 INJECTION, SOLUTION INTRAVENOUS; SUBCUTANEOUS DAILY
Status: DISCONTINUED | OUTPATIENT
Start: 2024-06-08 | End: 2024-06-09

## 2024-06-08 RX ORDER — DESMOPRESSIN ACETATE 4 UG/ML
20 INJECTION, SOLUTION INTRAVENOUS; SUBCUTANEOUS DAILY
Status: DISCONTINUED | OUTPATIENT
Start: 2024-06-08 | End: 2024-06-08

## 2024-06-08 RX ADMIN — POLYVINYL ALCOHOL, POVIDONE 2 DROP: 14; 6 SOLUTION/ DROPS OPHTHALMIC at 10:00

## 2024-06-08 RX ADMIN — FINASTERIDE 5 MG: 5 TABLET, FILM COATED ORAL at 07:45

## 2024-06-08 RX ADMIN — PROPOFOL 40 MCG/KG/MIN: 10 INJECTION, EMULSION INTRAVENOUS at 14:10

## 2024-06-08 RX ADMIN — AZATHIOPRINE 150 MG: 50 TABLET ORAL at 07:40

## 2024-06-08 RX ADMIN — CEFEPIME 2000 MG: 2 INJECTION, POWDER, FOR SOLUTION INTRAVENOUS at 08:06

## 2024-06-08 RX ADMIN — PROPOFOL 40 MCG/KG/MIN: 10 INJECTION, EMULSION INTRAVENOUS at 21:58

## 2024-06-08 RX ADMIN — POLYVINYL ALCOHOL, POVIDONE 2 DROP: 14; 6 SOLUTION/ DROPS OPHTHALMIC at 18:13

## 2024-06-08 RX ADMIN — PROPOFOL 40 MCG/KG/MIN: 10 INJECTION, EMULSION INTRAVENOUS at 02:39

## 2024-06-08 RX ADMIN — TAMSULOSIN HYDROCHLORIDE 0.4 MG: 0.4 CAPSULE ORAL at 20:54

## 2024-06-08 RX ADMIN — APIXABAN 2.5 MG: 5 TABLET, FILM COATED ORAL at 20:54

## 2024-06-08 RX ADMIN — CHLORHEXIDINE GLUCONATE, 0.12% ORAL RINSE 15 ML: 1.2 SOLUTION DENTAL at 07:44

## 2024-06-08 RX ADMIN — HEPARIN 100 UNITS: 100 SYRINGE at 20:55

## 2024-06-08 RX ADMIN — PROPOFOL 40 MCG/KG/MIN: 10 INJECTION, EMULSION INTRAVENOUS at 06:27

## 2024-06-08 RX ADMIN — POTASSIUM PHOSPHATE, MONOBASIC AND POTASSIUM PHOSPHATE, DIBASIC 15 MMOL: 224; 236 INJECTION, SOLUTION, CONCENTRATE INTRAVENOUS at 08:20

## 2024-06-08 RX ADMIN — POLYVINYL ALCOHOL, POVIDONE 2 DROP: 14; 6 SOLUTION/ DROPS OPHTHALMIC at 06:13

## 2024-06-08 RX ADMIN — PROPOFOL 40 MCG/KG/MIN: 10 INJECTION, EMULSION INTRAVENOUS at 17:48

## 2024-06-08 RX ADMIN — PROPOFOL 40 MCG/KG/MIN: 10 INJECTION, EMULSION INTRAVENOUS at 09:37

## 2024-06-08 RX ADMIN — POLYVINYL ALCOHOL, POVIDONE 2 DROP: 14; 6 SOLUTION/ DROPS OPHTHALMIC at 15:40

## 2024-06-08 RX ADMIN — CARBIDOPA AND LEVODOPA 1 TABLET: 25; 100 TABLET ORAL at 14:07

## 2024-06-08 RX ADMIN — VANCOMYCIN HYDROCHLORIDE 1750 MG: 10 INJECTION, POWDER, LYOPHILIZED, FOR SOLUTION INTRAVENOUS at 06:23

## 2024-06-08 RX ADMIN — ATORVASTATIN CALCIUM 10 MG: 10 TABLET, FILM COATED ORAL at 20:54

## 2024-06-08 RX ADMIN — POLYVINYL ALCOHOL, POVIDONE 2 DROP: 14; 6 SOLUTION/ DROPS OPHTHALMIC at 20:54

## 2024-06-08 RX ADMIN — SODIUM CHLORIDE, PRESERVATIVE FREE 10 ML: 5 INJECTION INTRAVENOUS at 20:55

## 2024-06-08 RX ADMIN — SODIUM CHLORIDE, PRESERVATIVE FREE 40 MG: 5 INJECTION INTRAVENOUS at 07:43

## 2024-06-08 RX ADMIN — DESMOPRESSIN ACETATE 1 MCG: 4 INJECTION, SOLUTION INTRAVENOUS; SUBCUTANEOUS at 14:06

## 2024-06-08 RX ADMIN — CARBIDOPA AND LEVODOPA 1 TABLET: 25; 100 TABLET ORAL at 20:54

## 2024-06-08 RX ADMIN — POLYVINYL ALCOHOL, POVIDONE 2 DROP: 14; 6 SOLUTION/ DROPS OPHTHALMIC at 03:29

## 2024-06-08 RX ADMIN — DIVALPROEX SODIUM 125 MG: 125 CAPSULE ORAL at 20:54

## 2024-06-08 RX ADMIN — OXYBUTYNIN CHLORIDE 5 MG: 5 TABLET ORAL at 20:54

## 2024-06-08 RX ADMIN — POTASSIUM BICARBONATE 40 MEQ: 782 TABLET, EFFERVESCENT ORAL at 06:13

## 2024-06-08 RX ADMIN — CARBIDOPA AND LEVODOPA 1 TABLET: 25; 100 TABLET ORAL at 07:51

## 2024-06-08 RX ADMIN — CHLORHEXIDINE GLUCONATE, 0.12% ORAL RINSE 15 ML: 1.2 SOLUTION DENTAL at 20:54

## 2024-06-08 RX ADMIN — OXYBUTYNIN CHLORIDE 5 MG: 5 TABLET ORAL at 07:41

## 2024-06-08 RX ADMIN — SODIUM CHLORIDE, PRESERVATIVE FREE 10 ML: 5 INJECTION INTRAVENOUS at 07:51

## 2024-06-08 RX ADMIN — APIXABAN 2.5 MG: 5 TABLET, FILM COATED ORAL at 07:42

## 2024-06-08 RX ADMIN — MEDROXYPROGESTERONE ACETATE 10 MG: 10 TABLET ORAL at 07:40

## 2024-06-08 RX ADMIN — DIVALPROEX SODIUM 125 MG: 125 CAPSULE ORAL at 07:41

## 2024-06-08 ASSESSMENT — PULMONARY FUNCTION TESTS
PIF_VALUE: 29
PIF_VALUE: 30
PIF_VALUE: 22
PIF_VALUE: 34
PIF_VALUE: 33
PIF_VALUE: 27
PIF_VALUE: 31
PIF_VALUE: 27
PIF_VALUE: 25
PIF_VALUE: 26
PIF_VALUE: 29
PIF_VALUE: 26
PIF_VALUE: 30
PIF_VALUE: 26
PIF_VALUE: 30
PIF_VALUE: 26
PIF_VALUE: 30
PIF_VALUE: 33
PIF_VALUE: 31
PIF_VALUE: 40
PIF_VALUE: 31
PIF_VALUE: 27
PIF_VALUE: 26
PIF_VALUE: 26
PIF_VALUE: 27
PIF_VALUE: 30
PIF_VALUE: 27
PIF_VALUE: 29
PIF_VALUE: 28
PIF_VALUE: 30
PIF_VALUE: 27
PIF_VALUE: 34
PIF_VALUE: 28

## 2024-06-08 ASSESSMENT — PAIN SCALES - GENERAL
PAINLEVEL_OUTOF10: 6
PAINLEVEL_OUTOF10: 0
PAINLEVEL_OUTOF10: 6
PAINLEVEL_OUTOF10: 1

## 2024-06-09 ENCOUNTER — APPOINTMENT (OUTPATIENT)
Dept: GENERAL RADIOLOGY | Age: 59
DRG: 004 | End: 2024-06-09
Payer: MEDICARE

## 2024-06-09 LAB
AADO2: 124.2 MMHG
ALBUMIN SERPL-MCNC: 2.7 G/DL (ref 3.5–5.2)
ALP SERPL-CCNC: 90 U/L (ref 40–129)
ALT SERPL-CCNC: 8 U/L (ref 0–40)
ANION GAP SERPL CALCULATED.3IONS-SCNC: 11 MMOL/L (ref 7–16)
ANION GAP SERPL CALCULATED.3IONS-SCNC: 9 MMOL/L (ref 7–16)
ANION GAP SERPL CALCULATED.3IONS-SCNC: 9 MMOL/L (ref 7–16)
AST SERPL-CCNC: 25 U/L (ref 0–39)
B.E.: -0.2 MMOL/L (ref -3–3)
BILIRUB SERPL-MCNC: 0.3 MG/DL (ref 0–1.2)
BUN SERPL-MCNC: 20 MG/DL (ref 6–23)
BUN SERPL-MCNC: 21 MG/DL (ref 6–23)
BUN SERPL-MCNC: 24 MG/DL (ref 6–23)
CALCIUM SERPL-MCNC: 8.8 MG/DL (ref 8.6–10.2)
CALCIUM SERPL-MCNC: 8.8 MG/DL (ref 8.6–10.2)
CALCIUM SERPL-MCNC: 9 MG/DL (ref 8.6–10.2)
CHLORIDE SERPL-SCNC: 114 MMOL/L (ref 98–107)
CHLORIDE SERPL-SCNC: 116 MMOL/L (ref 98–107)
CHLORIDE SERPL-SCNC: 116 MMOL/L (ref 98–107)
CO2 SERPL-SCNC: 23 MMOL/L (ref 22–29)
CO2 SERPL-SCNC: 24 MMOL/L (ref 22–29)
CO2 SERPL-SCNC: 25 MMOL/L (ref 22–29)
COHB: 0.5 % (ref 0–1.5)
CREAT SERPL-MCNC: 0.9 MG/DL (ref 0.7–1.2)
CREAT SERPL-MCNC: 1 MG/DL (ref 0.7–1.2)
CREAT SERPL-MCNC: 1.1 MG/DL (ref 0.7–1.2)
CRITICAL: ABNORMAL
DATE ANALYZED: ABNORMAL
DATE LAST DOSE: NORMAL
DATE OF COLLECTION: ABNORMAL
ERYTHROCYTE [DISTWIDTH] IN BLOOD BY AUTOMATED COUNT: 15.2 % (ref 11.5–15)
FIO2: 35 %
GFR, ESTIMATED: 77 ML/MIN/1.73M2
GFR, ESTIMATED: 90 ML/MIN/1.73M2
GFR, ESTIMATED: >90 ML/MIN/1.73M2
GLUCOSE SERPL-MCNC: 130 MG/DL (ref 74–99)
GLUCOSE SERPL-MCNC: 198 MG/DL (ref 74–99)
GLUCOSE SERPL-MCNC: 201 MG/DL (ref 74–99)
HCO3: 22.8 MMOL/L (ref 22–26)
HCT VFR BLD AUTO: 26.6 % (ref 37–54)
HGB BLD-MCNC: 8.2 G/DL (ref 12.5–16.5)
HHB: 3.1 % (ref 0–5)
LAB: ABNORMAL
Lab: 420
MAGNESIUM SERPL-MCNC: 2.4 MG/DL (ref 1.6–2.6)
MCH RBC QN AUTO: 32.8 PG (ref 26–35)
MCHC RBC AUTO-ENTMCNC: 30.8 G/DL (ref 32–34.5)
MCV RBC AUTO: 106.4 FL (ref 80–99.9)
METHB: 0.5 % (ref 0–1.5)
MODE: AC
O2 SATURATION: 97.5 % (ref 92–98.5)
O2HB: 95.9 % (ref 94–97)
OPERATOR ID: 3214
PATIENT TEMP: 37 C
PCO2: 31 MMHG (ref 35–45)
PEEP/CPAP: 8 CMH2O
PFO2: 2.55 MMHG/%
PH BLOOD GAS: 7.49 (ref 7.35–7.45)
PHOSPHATE SERPL-MCNC: 2.2 MG/DL (ref 2.5–4.5)
PLATELET # BLD AUTO: 240 K/UL (ref 130–450)
PMV BLD AUTO: 10.7 FL (ref 7–12)
PO2: 89.3 MMHG (ref 75–100)
POTASSIUM SERPL-SCNC: 3.5 MMOL/L (ref 3.5–5)
POTASSIUM SERPL-SCNC: 3.6 MMOL/L (ref 3.5–5)
POTASSIUM SERPL-SCNC: 3.6 MMOL/L (ref 3.5–5)
PROT SERPL-MCNC: 5.6 G/DL (ref 6.4–8.3)
RBC # BLD AUTO: 2.5 M/UL (ref 3.8–5.8)
RI(T): 1.39
RR MECHANICAL: 12 B/MIN
SODIUM SERPL-SCNC: 148 MMOL/L (ref 132–146)
SODIUM SERPL-SCNC: 149 MMOL/L (ref 132–146)
SODIUM SERPL-SCNC: 150 MMOL/L (ref 132–146)
SOURCE, BLOOD GAS: ABNORMAL
THB: 9.2 G/DL (ref 11.5–16.5)
TIME ANALYZED: 438
TME LAST DOSE: NORMAL H
VANCOMYCIN DOSE: NORMAL MG
VANCOMYCIN SERPL-MCNC: 14.7 UG/ML (ref 5–40)
VT MECHANICAL: 430 ML
WBC OTHER # BLD: 11.3 K/UL (ref 4.5–11.5)

## 2024-06-09 PROCEDURE — 6360000002 HC RX W HCPCS: Performed by: INTERNAL MEDICINE

## 2024-06-09 PROCEDURE — 71045 X-RAY EXAM CHEST 1 VIEW: CPT

## 2024-06-09 PROCEDURE — 84100 ASSAY OF PHOSPHORUS: CPT

## 2024-06-09 PROCEDURE — 6370000000 HC RX 637 (ALT 250 FOR IP)

## 2024-06-09 PROCEDURE — 6360000002 HC RX W HCPCS: Performed by: STUDENT IN AN ORGANIZED HEALTH CARE EDUCATION/TRAINING PROGRAM

## 2024-06-09 PROCEDURE — 80202 ASSAY OF VANCOMYCIN: CPT

## 2024-06-09 PROCEDURE — 2580000003 HC RX 258: Performed by: STUDENT IN AN ORGANIZED HEALTH CARE EDUCATION/TRAINING PROGRAM

## 2024-06-09 PROCEDURE — 99232 SBSQ HOSP IP/OBS MODERATE 35: CPT | Performed by: INTERNAL MEDICINE

## 2024-06-09 PROCEDURE — 85027 COMPLETE CBC AUTOMATED: CPT

## 2024-06-09 PROCEDURE — 80048 BASIC METABOLIC PNL TOTAL CA: CPT

## 2024-06-09 PROCEDURE — 6370000000 HC RX 637 (ALT 250 FOR IP): Performed by: NURSE PRACTITIONER

## 2024-06-09 PROCEDURE — 6360000002 HC RX W HCPCS

## 2024-06-09 PROCEDURE — A4216 STERILE WATER/SALINE, 10 ML: HCPCS

## 2024-06-09 PROCEDURE — C9113 INJ PANTOPRAZOLE SODIUM, VIA: HCPCS

## 2024-06-09 PROCEDURE — 6360000002 HC RX W HCPCS: Performed by: NURSE PRACTITIONER

## 2024-06-09 PROCEDURE — 94003 VENT MGMT INPAT SUBQ DAY: CPT

## 2024-06-09 PROCEDURE — 2000000000 HC ICU R&B

## 2024-06-09 PROCEDURE — 82805 BLOOD GASES W/O2 SATURATION: CPT

## 2024-06-09 PROCEDURE — 99291 CRITICAL CARE FIRST HOUR: CPT | Performed by: INTERNAL MEDICINE

## 2024-06-09 PROCEDURE — 83735 ASSAY OF MAGNESIUM: CPT

## 2024-06-09 PROCEDURE — 2580000003 HC RX 258

## 2024-06-09 PROCEDURE — 80053 COMPREHEN METABOLIC PANEL: CPT

## 2024-06-09 RX ORDER — DESMOPRESSIN ACETATE 4 UG/ML
1 INJECTION, SOLUTION INTRAVENOUS; SUBCUTANEOUS 2 TIMES DAILY
Status: DISCONTINUED | OUTPATIENT
Start: 2024-06-09 | End: 2024-06-10

## 2024-06-09 RX ADMIN — OXYBUTYNIN CHLORIDE 5 MG: 5 TABLET ORAL at 21:18

## 2024-06-09 RX ADMIN — ATORVASTATIN CALCIUM 10 MG: 10 TABLET, FILM COATED ORAL at 21:18

## 2024-06-09 RX ADMIN — CARBIDOPA AND LEVODOPA 1 TABLET: 25; 100 TABLET ORAL at 15:21

## 2024-06-09 RX ADMIN — DESMOPRESSIN ACETATE 1 MCG: 4 INJECTION, SOLUTION INTRAVENOUS; SUBCUTANEOUS at 08:40

## 2024-06-09 RX ADMIN — CARBIDOPA AND LEVODOPA 1 TABLET: 25; 100 TABLET ORAL at 08:36

## 2024-06-09 RX ADMIN — POLYVINYL ALCOHOL, POVIDONE 2 DROP: 14; 6 SOLUTION/ DROPS OPHTHALMIC at 11:00

## 2024-06-09 RX ADMIN — APIXABAN 2.5 MG: 5 TABLET, FILM COATED ORAL at 21:18

## 2024-06-09 RX ADMIN — VANCOMYCIN HYDROCHLORIDE 1750 MG: 10 INJECTION, POWDER, LYOPHILIZED, FOR SOLUTION INTRAVENOUS at 05:59

## 2024-06-09 RX ADMIN — POLYVINYL ALCOHOL, POVIDONE 2 DROP: 14; 6 SOLUTION/ DROPS OPHTHALMIC at 01:55

## 2024-06-09 RX ADMIN — CHLORHEXIDINE GLUCONATE, 0.12% ORAL RINSE 15 ML: 1.2 SOLUTION DENTAL at 08:49

## 2024-06-09 RX ADMIN — POLYVINYL ALCOHOL, POVIDONE 2 DROP: 14; 6 SOLUTION/ DROPS OPHTHALMIC at 05:59

## 2024-06-09 RX ADMIN — APIXABAN 2.5 MG: 5 TABLET, FILM COATED ORAL at 08:36

## 2024-06-09 RX ADMIN — HEPARIN 100 UNITS: 100 SYRINGE at 21:19

## 2024-06-09 RX ADMIN — DIVALPROEX SODIUM 125 MG: 125 CAPSULE ORAL at 21:18

## 2024-06-09 RX ADMIN — OXYBUTYNIN CHLORIDE 5 MG: 5 TABLET ORAL at 08:36

## 2024-06-09 RX ADMIN — DIVALPROEX SODIUM 125 MG: 125 CAPSULE ORAL at 08:36

## 2024-06-09 RX ADMIN — AZATHIOPRINE 150 MG: 50 TABLET ORAL at 08:36

## 2024-06-09 RX ADMIN — PROPOFOL 40 MCG/KG/MIN: 10 INJECTION, EMULSION INTRAVENOUS at 04:36

## 2024-06-09 RX ADMIN — SODIUM CHLORIDE, PRESERVATIVE FREE 40 MG: 5 INJECTION INTRAVENOUS at 08:36

## 2024-06-09 RX ADMIN — FINASTERIDE 5 MG: 5 TABLET, FILM COATED ORAL at 08:36

## 2024-06-09 RX ADMIN — TAMSULOSIN HYDROCHLORIDE 0.4 MG: 0.4 CAPSULE ORAL at 21:18

## 2024-06-09 RX ADMIN — CARBIDOPA AND LEVODOPA 1 TABLET: 25; 100 TABLET ORAL at 21:18

## 2024-06-09 RX ADMIN — SODIUM CHLORIDE, PRESERVATIVE FREE 10 ML: 5 INJECTION INTRAVENOUS at 21:21

## 2024-06-09 RX ADMIN — DESMOPRESSIN ACETATE 1 MCG: 4 INJECTION INTRAVENOUS; SUBCUTANEOUS at 21:18

## 2024-06-09 RX ADMIN — POLYVINYL ALCOHOL, POVIDONE 2 DROP: 14; 6 SOLUTION/ DROPS OPHTHALMIC at 22:10

## 2024-06-09 RX ADMIN — MEDROXYPROGESTERONE ACETATE 10 MG: 10 TABLET ORAL at 08:36

## 2024-06-09 RX ADMIN — POLYVINYL ALCOHOL, POVIDONE 2 DROP: 14; 6 SOLUTION/ DROPS OPHTHALMIC at 18:12

## 2024-06-09 RX ADMIN — PROPOFOL 20 MCG/KG/MIN: 10 INJECTION, EMULSION INTRAVENOUS at 12:40

## 2024-06-09 RX ADMIN — POLYVINYL ALCOHOL, POVIDONE 2 DROP: 14; 6 SOLUTION/ DROPS OPHTHALMIC at 08:37

## 2024-06-09 RX ADMIN — CHLORHEXIDINE GLUCONATE, 0.12% ORAL RINSE 15 ML: 1.2 SOLUTION DENTAL at 21:19

## 2024-06-09 RX ADMIN — PROPOFOL 40 MCG/KG/MIN: 10 INJECTION, EMULSION INTRAVENOUS at 01:31

## 2024-06-09 RX ADMIN — SODIUM CHLORIDE, PRESERVATIVE FREE 10 ML: 5 INJECTION INTRAVENOUS at 08:37

## 2024-06-09 ASSESSMENT — PULMONARY FUNCTION TESTS
PIF_VALUE: 25
PIF_VALUE: 19
PIF_VALUE: 27
PIF_VALUE: 21
PIF_VALUE: 27
PIF_VALUE: 30
PIF_VALUE: 20
PIF_VALUE: 25
PIF_VALUE: 26
PIF_VALUE: 22
PIF_VALUE: 26
PIF_VALUE: 22
PIF_VALUE: 23
PIF_VALUE: 22
PIF_VALUE: 26
PIF_VALUE: 21
PIF_VALUE: 21
PIF_VALUE: 26
PIF_VALUE: 20
PIF_VALUE: 26
PIF_VALUE: 23
PIF_VALUE: 23
PIF_VALUE: 25
PIF_VALUE: 24
PIF_VALUE: 21
PIF_VALUE: 21
PIF_VALUE: 20
PIF_VALUE: 26
PIF_VALUE: 24
PIF_VALUE: 23

## 2024-06-09 ASSESSMENT — PAIN SCALES - WONG BAKER
WONGBAKER_NUMERICALRESPONSE: HURTS EVEN MORE

## 2024-06-09 ASSESSMENT — PAIN SCALES - GENERAL
PAINLEVEL_OUTOF10: 0
PAINLEVEL_OUTOF10: 8
PAINLEVEL_OUTOF10: 0
PAINLEVEL_OUTOF10: 5
PAINLEVEL_OUTOF10: 0

## 2024-06-10 ENCOUNTER — APPOINTMENT (OUTPATIENT)
Dept: GENERAL RADIOLOGY | Age: 59
DRG: 004 | End: 2024-06-10
Payer: MEDICARE

## 2024-06-10 LAB
ALBUMIN SERPL-MCNC: 2.7 G/DL (ref 3.5–5.2)
ALP SERPL-CCNC: 94 U/L (ref 40–129)
ALT SERPL-CCNC: 9 U/L (ref 0–40)
ANA SER QL IA: NEGATIVE
ANION GAP SERPL CALCULATED.3IONS-SCNC: 11 MMOL/L (ref 7–16)
AST SERPL-CCNC: 30 U/L (ref 0–39)
B.E.: 0.9 MMOL/L (ref -3–3)
BILIRUB SERPL-MCNC: 0.3 MG/DL (ref 0–1.2)
BUN SERPL-MCNC: 20 MG/DL (ref 6–23)
CALCIUM SERPL-MCNC: 8.9 MG/DL (ref 8.6–10.2)
CHLORIDE SERPL-SCNC: 113 MMOL/L (ref 98–107)
CO2 SERPL-SCNC: 24 MMOL/L (ref 22–29)
COHB: 0.5 % (ref 0–1.5)
CREAT SERPL-MCNC: 0.9 MG/DL (ref 0.7–1.2)
CRITICAL: ABNORMAL
DATE ANALYZED: ABNORMAL
DATE OF COLLECTION: ABNORMAL
ERYTHROCYTE [DISTWIDTH] IN BLOOD BY AUTOMATED COUNT: 15.3 % (ref 11.5–15)
GFR, ESTIMATED: >60 ML/MIN/1.73M2
GLUCOSE SERPL-MCNC: 151 MG/DL (ref 74–99)
HCO3: 23.8 MMOL/L (ref 22–26)
HCT VFR BLD AUTO: 27.5 % (ref 37–54)
HGB BLD-MCNC: 8.4 G/DL (ref 12.5–16.5)
HHB: 3.1 % (ref 0–5)
LAB: ABNORMAL
Lab: 400
MAGNESIUM SERPL-MCNC: 2.3 MG/DL (ref 1.6–2.6)
MCH RBC QN AUTO: 33.1 PG (ref 26–35)
MCHC RBC AUTO-ENTMCNC: 30.5 G/DL (ref 32–34.5)
MCV RBC AUTO: 108.3 FL (ref 80–99.9)
METHB: 0.5 % (ref 0–1.5)
MODE: AC
O2 SATURATION: 97.7 % (ref 92–98.5)
O2HB: 95.9 % (ref 94–97)
OPERATOR ID: 264
PATIENT TEMP: 37
PCO2: 31.5 MMHG (ref 35–45)
PEEP/CPAP: 8 CMH2O
PH BLOOD GAS: 7.5 (ref 7.35–7.45)
PHOSPHATE SERPL-MCNC: 2.9 MG/DL (ref 2.5–4.5)
PLATELET # BLD AUTO: 232 K/UL (ref 130–450)
PMV BLD AUTO: 10.9 FL (ref 7–12)
PO2: 93.1 MMHG (ref 75–100)
POTASSIUM SERPL-SCNC: 3.5 MMOL/L (ref 3.5–5)
PROT SERPL-MCNC: 5.9 G/DL (ref 6.4–8.3)
RBC # BLD AUTO: 2.54 M/UL (ref 3.8–5.8)
RR MECHANICAL: 12 B/MIN
SODIUM SERPL-SCNC: 148 MMOL/L (ref 132–146)
SOURCE, BLOOD GAS: ABNORMAL
THB: 9.5 G/DL (ref 11.5–16.5)
TIME ANALYZED: 415
VT MECHANICAL: 430 ML
WBC OTHER # BLD: 10 K/UL (ref 4.5–11.5)

## 2024-06-10 PROCEDURE — 6360000002 HC RX W HCPCS

## 2024-06-10 PROCEDURE — 6370000000 HC RX 637 (ALT 250 FOR IP)

## 2024-06-10 PROCEDURE — 6360000002 HC RX W HCPCS: Performed by: INTERNAL MEDICINE

## 2024-06-10 PROCEDURE — 6370000000 HC RX 637 (ALT 250 FOR IP): Performed by: INTERNAL MEDICINE

## 2024-06-10 PROCEDURE — 99222 1ST HOSP IP/OBS MODERATE 55: CPT | Performed by: NURSE PRACTITIONER

## 2024-06-10 PROCEDURE — 6370000000 HC RX 637 (ALT 250 FOR IP): Performed by: NURSE PRACTITIONER

## 2024-06-10 PROCEDURE — 2580000003 HC RX 258: Performed by: STUDENT IN AN ORGANIZED HEALTH CARE EDUCATION/TRAINING PROGRAM

## 2024-06-10 PROCEDURE — 85027 COMPLETE CBC AUTOMATED: CPT

## 2024-06-10 PROCEDURE — 84100 ASSAY OF PHOSPHORUS: CPT

## 2024-06-10 PROCEDURE — 99233 SBSQ HOSP IP/OBS HIGH 50: CPT | Performed by: INTERNAL MEDICINE

## 2024-06-10 PROCEDURE — 2580000003 HC RX 258: Performed by: INTERNAL MEDICINE

## 2024-06-10 PROCEDURE — 6360000002 HC RX W HCPCS: Performed by: NURSE PRACTITIONER

## 2024-06-10 PROCEDURE — 99291 CRITICAL CARE FIRST HOUR: CPT | Performed by: INTERNAL MEDICINE

## 2024-06-10 PROCEDURE — 71045 X-RAY EXAM CHEST 1 VIEW: CPT

## 2024-06-10 PROCEDURE — C9113 INJ PANTOPRAZOLE SODIUM, VIA: HCPCS

## 2024-06-10 PROCEDURE — 83735 ASSAY OF MAGNESIUM: CPT

## 2024-06-10 PROCEDURE — 82805 BLOOD GASES W/O2 SATURATION: CPT

## 2024-06-10 PROCEDURE — A4216 STERILE WATER/SALINE, 10 ML: HCPCS

## 2024-06-10 PROCEDURE — 2500000003 HC RX 250 WO HCPCS: Performed by: INTERNAL MEDICINE

## 2024-06-10 PROCEDURE — 6360000002 HC RX W HCPCS: Performed by: STUDENT IN AN ORGANIZED HEALTH CARE EDUCATION/TRAINING PROGRAM

## 2024-06-10 PROCEDURE — 2000000000 HC ICU R&B

## 2024-06-10 PROCEDURE — 80053 COMPREHEN METABOLIC PANEL: CPT

## 2024-06-10 PROCEDURE — 94003 VENT MGMT INPAT SUBQ DAY: CPT

## 2024-06-10 PROCEDURE — 2580000003 HC RX 258

## 2024-06-10 RX ORDER — DESMOPRESSIN ACETATE 4 UG/ML
2 INJECTION, SOLUTION INTRAVENOUS; SUBCUTANEOUS 2 TIMES DAILY
Status: DISCONTINUED | OUTPATIENT
Start: 2024-06-10 | End: 2024-06-11

## 2024-06-10 RX ORDER — SODIUM CHLORIDE 0.9 % (FLUSH) 0.9 %
5-40 SYRINGE (ML) INJECTION EVERY 12 HOURS SCHEDULED
Status: DISCONTINUED | OUTPATIENT
Start: 2024-06-10 | End: 2024-06-18

## 2024-06-10 RX ORDER — HEPARIN 100 UNIT/ML
3 SYRINGE INTRAVENOUS PRN
Status: DISCONTINUED | OUTPATIENT
Start: 2024-06-10 | End: 2024-06-20 | Stop reason: HOSPADM

## 2024-06-10 RX ORDER — LIDOCAINE HYDROCHLORIDE 10 MG/ML
5 INJECTION, SOLUTION EPIDURAL; INFILTRATION; INTRACAUDAL; PERINEURAL ONCE
Status: DISCONTINUED | OUTPATIENT
Start: 2024-06-10 | End: 2024-06-20 | Stop reason: HOSPADM

## 2024-06-10 RX ORDER — SODIUM CHLORIDE 9 MG/ML
INJECTION, SOLUTION INTRAVENOUS PRN
Status: DISCONTINUED | OUTPATIENT
Start: 2024-06-10 | End: 2024-06-18

## 2024-06-10 RX ORDER — QUETIAPINE FUMARATE 100 MG/1
200 TABLET, FILM COATED ORAL 2 TIMES DAILY
Status: DISCONTINUED | OUTPATIENT
Start: 2024-06-10 | End: 2024-06-20 | Stop reason: HOSPADM

## 2024-06-10 RX ORDER — SODIUM CHLORIDE 0.9 % (FLUSH) 0.9 %
5-40 SYRINGE (ML) INJECTION PRN
Status: DISCONTINUED | OUTPATIENT
Start: 2024-06-10 | End: 2024-06-18

## 2024-06-10 RX ORDER — HEPARIN 100 UNIT/ML
3 SYRINGE INTRAVENOUS EVERY 12 HOURS SCHEDULED
Status: DISCONTINUED | OUTPATIENT
Start: 2024-06-10 | End: 2024-06-20 | Stop reason: HOSPADM

## 2024-06-10 RX ADMIN — SODIUM CHLORIDE, PRESERVATIVE FREE 40 MG: 5 INJECTION INTRAVENOUS at 08:16

## 2024-06-10 RX ADMIN — DESMOPRESSIN ACETATE 2 MCG: 4 INJECTION INTRAVENOUS; SUBCUTANEOUS at 21:29

## 2024-06-10 RX ADMIN — ATORVASTATIN CALCIUM 10 MG: 10 TABLET, FILM COATED ORAL at 21:22

## 2024-06-10 RX ADMIN — POLYVINYL ALCOHOL, POVIDONE 2 DROP: 14; 6 SOLUTION/ DROPS OPHTHALMIC at 05:44

## 2024-06-10 RX ADMIN — POLYVINYL ALCOHOL, POVIDONE 2 DROP: 14; 6 SOLUTION/ DROPS OPHTHALMIC at 02:02

## 2024-06-10 RX ADMIN — POLYVINYL ALCOHOL, POVIDONE 2 DROP: 14; 6 SOLUTION/ DROPS OPHTHALMIC at 08:27

## 2024-06-10 RX ADMIN — OXYBUTYNIN CHLORIDE 5 MG: 5 TABLET ORAL at 21:22

## 2024-06-10 RX ADMIN — CARBIDOPA AND LEVODOPA 1 TABLET: 25; 100 TABLET ORAL at 21:28

## 2024-06-10 RX ADMIN — CHLORHEXIDINE GLUCONATE, 0.12% ORAL RINSE 15 ML: 1.2 SOLUTION DENTAL at 21:22

## 2024-06-10 RX ADMIN — APIXABAN 2.5 MG: 5 TABLET, FILM COATED ORAL at 08:17

## 2024-06-10 RX ADMIN — PROPOFOL 25 MCG/KG/MIN: 10 INJECTION, EMULSION INTRAVENOUS at 21:40

## 2024-06-10 RX ADMIN — POLYVINYL ALCOHOL, POVIDONE 2 DROP: 14; 6 SOLUTION/ DROPS OPHTHALMIC at 21:41

## 2024-06-10 RX ADMIN — PROPOFOL 25.03 MCG/KG/MIN: 10 INJECTION, EMULSION INTRAVENOUS at 17:05

## 2024-06-10 RX ADMIN — GABAPENTIN 800 MG: 400 CAPSULE ORAL at 21:23

## 2024-06-10 RX ADMIN — CARBIDOPA AND LEVODOPA 1 TABLET: 25; 100 TABLET ORAL at 13:32

## 2024-06-10 RX ADMIN — DESMOPRESSIN ACETATE 1 MCG: 4 INJECTION INTRAVENOUS; SUBCUTANEOUS at 08:16

## 2024-06-10 RX ADMIN — QUETIAPINE FUMARATE 200 MG: 100 TABLET ORAL at 21:22

## 2024-06-10 RX ADMIN — MEDROXYPROGESTERONE ACETATE 10 MG: 10 TABLET ORAL at 08:17

## 2024-06-10 RX ADMIN — ACETAMINOPHEN 650 MG: 325 TABLET ORAL at 21:41

## 2024-06-10 RX ADMIN — QUETIAPINE FUMARATE 200 MG: 100 TABLET ORAL at 13:32

## 2024-06-10 RX ADMIN — TAMSULOSIN HYDROCHLORIDE 0.4 MG: 0.4 CAPSULE ORAL at 21:22

## 2024-06-10 RX ADMIN — FINASTERIDE 5 MG: 5 TABLET, FILM COATED ORAL at 08:18

## 2024-06-10 RX ADMIN — AZATHIOPRINE 150 MG: 50 TABLET ORAL at 08:17

## 2024-06-10 RX ADMIN — POLYVINYL ALCOHOL, POVIDONE 2 DROP: 14; 6 SOLUTION/ DROPS OPHTHALMIC at 16:54

## 2024-06-10 RX ADMIN — SODIUM CHLORIDE, PRESERVATIVE FREE 10 ML: 5 INJECTION INTRAVENOUS at 08:28

## 2024-06-10 RX ADMIN — DIVALPROEX SODIUM 125 MG: 125 CAPSULE ORAL at 08:17

## 2024-06-10 RX ADMIN — POLYVINYL ALCOHOL, POVIDONE 2 DROP: 14; 6 SOLUTION/ DROPS OPHTHALMIC at 14:50

## 2024-06-10 RX ADMIN — CHLORHEXIDINE GLUCONATE, 0.12% ORAL RINSE 15 ML: 1.2 SOLUTION DENTAL at 08:18

## 2024-06-10 RX ADMIN — PROPOFOL 25.03 MCG/KG/MIN: 10 INJECTION, EMULSION INTRAVENOUS at 07:59

## 2024-06-10 RX ADMIN — VANCOMYCIN HYDROCHLORIDE 1750 MG: 10 INJECTION, POWDER, LYOPHILIZED, FOR SOLUTION INTRAVENOUS at 05:45

## 2024-06-10 RX ADMIN — OXYBUTYNIN CHLORIDE 5 MG: 5 TABLET ORAL at 08:17

## 2024-06-10 RX ADMIN — ACETAMINOPHEN 650 MG: 325 TABLET ORAL at 14:52

## 2024-06-10 RX ADMIN — SODIUM CHLORIDE, PRESERVATIVE FREE 10 ML: 5 INJECTION INTRAVENOUS at 21:22

## 2024-06-10 RX ADMIN — GABAPENTIN 800 MG: 400 CAPSULE ORAL at 13:32

## 2024-06-10 RX ADMIN — VALPROATE SODIUM 750 MG: 100 INJECTION, SOLUTION INTRAVENOUS at 14:50

## 2024-06-10 RX ADMIN — PROPOFOL 20 MCG/KG/MIN: 10 INJECTION, EMULSION INTRAVENOUS at 02:58

## 2024-06-10 RX ADMIN — SODIUM CHLORIDE, PRESERVATIVE FREE 10 ML: 5 INJECTION INTRAVENOUS at 21:23

## 2024-06-10 RX ADMIN — CARBIDOPA AND LEVODOPA 1 TABLET: 25; 100 TABLET ORAL at 08:17

## 2024-06-10 RX ADMIN — APIXABAN 2.5 MG: 5 TABLET, FILM COATED ORAL at 21:22

## 2024-06-10 ASSESSMENT — PULMONARY FUNCTION TESTS
PIF_VALUE: 22
PIF_VALUE: 22
PIF_VALUE: 20
PIF_VALUE: 30
PIF_VALUE: 32
PIF_VALUE: 34
PIF_VALUE: 21
PIF_VALUE: 23
PIF_VALUE: 35
PIF_VALUE: 21
PIF_VALUE: 30
PIF_VALUE: 32
PIF_VALUE: 27
PIF_VALUE: 33
PIF_VALUE: 31
PIF_VALUE: 20
PIF_VALUE: 26
PIF_VALUE: 29
PIF_VALUE: 24
PIF_VALUE: 32
PIF_VALUE: 36
PIF_VALUE: 22
PIF_VALUE: 22
PIF_VALUE: 32
PIF_VALUE: 23
PIF_VALUE: 21
PIF_VALUE: 27
PIF_VALUE: 31
PIF_VALUE: 23

## 2024-06-10 ASSESSMENT — PAIN SCALES - GENERAL
PAINLEVEL_OUTOF10: 0

## 2024-06-11 ENCOUNTER — APPOINTMENT (OUTPATIENT)
Dept: GENERAL RADIOLOGY | Age: 59
DRG: 004 | End: 2024-06-11
Payer: MEDICARE

## 2024-06-11 LAB
AADO2: 92.8 MMHG
ALBUMIN SERPL-MCNC: 2.4 G/DL (ref 3.5–5.2)
ALP SERPL-CCNC: 94 U/L (ref 40–129)
ALT SERPL-CCNC: 9 U/L (ref 0–40)
ANION GAP SERPL CALCULATED.3IONS-SCNC: 8 MMOL/L (ref 7–16)
AST SERPL-CCNC: 26 U/L (ref 0–39)
B.E.: 2.3 MMOL/L (ref -3–3)
BASOPHILS # BLD: 0 K/UL (ref 0–0.2)
BASOPHILS NFR BLD: 0 % (ref 0–2)
BILIRUB SERPL-MCNC: 0.3 MG/DL (ref 0–1.2)
BUN SERPL-MCNC: 19 MG/DL (ref 6–23)
CALCIUM SERPL-MCNC: 8.8 MG/DL (ref 8.6–10.2)
CHLORIDE SERPL-SCNC: 113 MMOL/L (ref 98–107)
CO2 SERPL-SCNC: 26 MMOL/L (ref 22–29)
COHB: 0.1 % (ref 0–1.5)
CREAT SERPL-MCNC: 0.8 MG/DL (ref 0.7–1.2)
CRITICAL: ABNORMAL
DATE ANALYZED: ABNORMAL
DATE OF COLLECTION: ABNORMAL
EOSINOPHIL # BLD: 0.26 K/UL (ref 0.05–0.5)
EOSINOPHILS RELATIVE PERCENT: 4 % (ref 0–6)
ERYTHROCYTE [DISTWIDTH] IN BLOOD BY AUTOMATED COUNT: 15.2 % (ref 11.5–15)
FIO2: 35 %
GFR, ESTIMATED: >90 ML/MIN/1.73M2
GLUCOSE SERPL-MCNC: 207 MG/DL (ref 74–99)
HCO3: 25.3 MMOL/L (ref 22–26)
HCT VFR BLD AUTO: 25.9 % (ref 37–54)
HGB BLD-MCNC: 7.7 G/DL (ref 12.5–16.5)
HHB: 2.1 % (ref 0–5)
INR PPP: 1.4
LAB: ABNORMAL
LYMPHOCYTES NFR BLD: 0.79 K/UL (ref 1.5–4)
LYMPHOCYTES RELATIVE PERCENT: 11 % (ref 20–42)
Lab: 410
MAGNESIUM SERPL-MCNC: 2.2 MG/DL (ref 1.6–2.6)
MCH RBC QN AUTO: 32.2 PG (ref 26–35)
MCHC RBC AUTO-ENTMCNC: 29.7 G/DL (ref 32–34.5)
MCV RBC AUTO: 108.4 FL (ref 80–99.9)
METAMYELOCYTES ABSOLUTE COUNT: 0.13 K/UL (ref 0–0.12)
METAMYELOCYTES: 2 % (ref 0–1)
METHB: 0.4 % (ref 0–1.5)
MODE: AC
MONOCYTES NFR BLD: 0.39 K/UL (ref 0.1–0.95)
MONOCYTES NFR BLD: 5 % (ref 2–12)
MYELOCYTES ABSOLUTE COUNT: 0.07 K/UL
MYELOCYTES: 1 %
NEUTROPHILS NFR BLD: 78 % (ref 43–80)
NEUTS SEG NFR BLD: 5.86 K/UL (ref 1.8–7.3)
NUCLEATED RED BLOOD CELLS: 4 PER 100 WBC
O2 SATURATION: 98.6 % (ref 92–98.5)
O2HB: 97.4 % (ref 94–97)
OPERATOR ID: 2593
PARTIAL THROMBOPLASTIN TIME: 36.3 SEC (ref 24.5–35.1)
PATIENT TEMP: 37 C
PCO2: 33.3 MMHG (ref 35–45)
PEEP/CPAP: 8 CMH2O
PFO2: 3.37 MMHG/%
PH BLOOD GAS: 7.5 (ref 7.35–7.45)
PHOSPHATE SERPL-MCNC: 3 MG/DL (ref 2.5–4.5)
PLATELET # BLD AUTO: 199 K/UL (ref 130–450)
PMV BLD AUTO: 11.1 FL (ref 7–12)
PO2: 118 MMHG (ref 75–100)
POTASSIUM SERPL-SCNC: 3.9 MMOL/L (ref 3.5–5)
PROT SERPL-MCNC: 5.3 G/DL (ref 6.4–8.3)
PROTHROMBIN TIME: 15.2 SEC (ref 9.3–12.4)
RBC # BLD AUTO: 2.39 M/UL (ref 3.8–5.8)
RBC # BLD: ABNORMAL 10*6/UL
RI(T): 0.79
RR MECHANICAL: 12 B/MIN
SODIUM SERPL-SCNC: 147 MMOL/L (ref 132–146)
SOURCE, BLOOD GAS: ABNORMAL
THB: 9.4 G/DL (ref 11.5–16.5)
TIME ANALYZED: 418
TRIGL SERPL-MCNC: 162 MG/DL
VT MECHANICAL: 430 ML
WBC OTHER # BLD: 7.5 K/UL (ref 4.5–11.5)

## 2024-06-11 PROCEDURE — 6360000002 HC RX W HCPCS: Performed by: INTERNAL MEDICINE

## 2024-06-11 PROCEDURE — 6360000002 HC RX W HCPCS: Performed by: NURSE PRACTITIONER

## 2024-06-11 PROCEDURE — 2580000003 HC RX 258

## 2024-06-11 PROCEDURE — 80053 COMPREHEN METABOLIC PANEL: CPT

## 2024-06-11 PROCEDURE — C9113 INJ PANTOPRAZOLE SODIUM, VIA: HCPCS

## 2024-06-11 PROCEDURE — 2500000003 HC RX 250 WO HCPCS: Performed by: INTERNAL MEDICINE

## 2024-06-11 PROCEDURE — 99291 CRITICAL CARE FIRST HOUR: CPT | Performed by: INTERNAL MEDICINE

## 2024-06-11 PROCEDURE — 94640 AIRWAY INHALATION TREATMENT: CPT

## 2024-06-11 PROCEDURE — 85730 THROMBOPLASTIN TIME PARTIAL: CPT

## 2024-06-11 PROCEDURE — 6370000000 HC RX 637 (ALT 250 FOR IP)

## 2024-06-11 PROCEDURE — 2580000003 HC RX 258: Performed by: STUDENT IN AN ORGANIZED HEALTH CARE EDUCATION/TRAINING PROGRAM

## 2024-06-11 PROCEDURE — 6360000002 HC RX W HCPCS

## 2024-06-11 PROCEDURE — 2000000000 HC ICU R&B

## 2024-06-11 PROCEDURE — 82805 BLOOD GASES W/O2 SATURATION: CPT

## 2024-06-11 PROCEDURE — 99232 SBSQ HOSP IP/OBS MODERATE 35: CPT | Performed by: FAMILY MEDICINE

## 2024-06-11 PROCEDURE — 83735 ASSAY OF MAGNESIUM: CPT

## 2024-06-11 PROCEDURE — 6370000000 HC RX 637 (ALT 250 FOR IP): Performed by: NURSE PRACTITIONER

## 2024-06-11 PROCEDURE — 6360000002 HC RX W HCPCS: Performed by: STUDENT IN AN ORGANIZED HEALTH CARE EDUCATION/TRAINING PROGRAM

## 2024-06-11 PROCEDURE — 6370000000 HC RX 637 (ALT 250 FOR IP): Performed by: INTERNAL MEDICINE

## 2024-06-11 PROCEDURE — 84100 ASSAY OF PHOSPHORUS: CPT

## 2024-06-11 PROCEDURE — 99232 SBSQ HOSP IP/OBS MODERATE 35: CPT | Performed by: PHYSICIAN ASSISTANT

## 2024-06-11 PROCEDURE — 2580000003 HC RX 258: Performed by: INTERNAL MEDICINE

## 2024-06-11 PROCEDURE — 85610 PROTHROMBIN TIME: CPT

## 2024-06-11 PROCEDURE — 71045 X-RAY EXAM CHEST 1 VIEW: CPT

## 2024-06-11 PROCEDURE — 85025 COMPLETE CBC W/AUTO DIFF WBC: CPT

## 2024-06-11 PROCEDURE — 94003 VENT MGMT INPAT SUBQ DAY: CPT

## 2024-06-11 PROCEDURE — 84478 ASSAY OF TRIGLYCERIDES: CPT

## 2024-06-11 RX ORDER — DOCUSATE SODIUM 100 MG/1
100 CAPSULE, LIQUID FILLED ORAL DAILY
Status: DISCONTINUED | OUTPATIENT
Start: 2024-06-11 | End: 2024-06-18 | Stop reason: SDUPTHER

## 2024-06-11 RX ORDER — DESMOPRESSIN ACETATE 4 UG/ML
5 INJECTION, SOLUTION INTRAVENOUS; SUBCUTANEOUS 2 TIMES DAILY
Status: DISCONTINUED | OUTPATIENT
Start: 2024-06-11 | End: 2024-06-12

## 2024-06-11 RX ORDER — HYDRALAZINE HYDROCHLORIDE 20 MG/ML
10 INJECTION INTRAMUSCULAR; INTRAVENOUS EVERY 6 HOURS PRN
Status: DISCONTINUED | OUTPATIENT
Start: 2024-06-11 | End: 2024-06-20 | Stop reason: HOSPADM

## 2024-06-11 RX ADMIN — IPRATROPIUM BROMIDE AND ALBUTEROL SULFATE 1 DOSE: 2.5; .5 SOLUTION RESPIRATORY (INHALATION) at 16:10

## 2024-06-11 RX ADMIN — APIXABAN 2.5 MG: 5 TABLET, FILM COATED ORAL at 21:26

## 2024-06-11 RX ADMIN — CHLORHEXIDINE GLUCONATE, 0.12% ORAL RINSE 15 ML: 1.2 SOLUTION DENTAL at 21:26

## 2024-06-11 RX ADMIN — APIXABAN 2.5 MG: 5 TABLET, FILM COATED ORAL at 09:18

## 2024-06-11 RX ADMIN — GABAPENTIN 800 MG: 400 CAPSULE ORAL at 09:29

## 2024-06-11 RX ADMIN — FINASTERIDE 5 MG: 5 TABLET, FILM COATED ORAL at 09:18

## 2024-06-11 RX ADMIN — SODIUM CHLORIDE, PRESERVATIVE FREE 40 MG: 5 INJECTION INTRAVENOUS at 09:22

## 2024-06-11 RX ADMIN — PROPOFOL 30 MCG/KG/MIN: 10 INJECTION, EMULSION INTRAVENOUS at 07:51

## 2024-06-11 RX ADMIN — CARBIDOPA AND LEVODOPA 1 TABLET: 25; 100 TABLET ORAL at 21:25

## 2024-06-11 RX ADMIN — VANCOMYCIN HYDROCHLORIDE 1750 MG: 10 INJECTION, POWDER, LYOPHILIZED, FOR SOLUTION INTRAVENOUS at 05:11

## 2024-06-11 RX ADMIN — POLYVINYL ALCOHOL, POVIDONE 2 DROP: 14; 6 SOLUTION/ DROPS OPHTHALMIC at 05:11

## 2024-06-11 RX ADMIN — POLYVINYL ALCOHOL, POVIDONE 2 DROP: 14; 6 SOLUTION/ DROPS OPHTHALMIC at 18:28

## 2024-06-11 RX ADMIN — DESMOPRESSIN ACETATE 2 MCG: 4 INJECTION INTRAVENOUS; SUBCUTANEOUS at 09:29

## 2024-06-11 RX ADMIN — OXYBUTYNIN CHLORIDE 5 MG: 5 TABLET ORAL at 09:18

## 2024-06-11 RX ADMIN — POLYVINYL ALCOHOL, POVIDONE 2 DROP: 14; 6 SOLUTION/ DROPS OPHTHALMIC at 00:29

## 2024-06-11 RX ADMIN — PROPOFOL 25 MCG/KG/MIN: 10 INJECTION, EMULSION INTRAVENOUS at 03:19

## 2024-06-11 RX ADMIN — CHLORHEXIDINE GLUCONATE, 0.12% ORAL RINSE 15 ML: 1.2 SOLUTION DENTAL at 09:19

## 2024-06-11 RX ADMIN — CARBIDOPA AND LEVODOPA 1 TABLET: 25; 100 TABLET ORAL at 15:33

## 2024-06-11 RX ADMIN — ACETAMINOPHEN 650 MG: 325 TABLET ORAL at 18:26

## 2024-06-11 RX ADMIN — OXYBUTYNIN CHLORIDE 5 MG: 5 TABLET ORAL at 21:27

## 2024-06-11 RX ADMIN — SODIUM CHLORIDE, PRESERVATIVE FREE 10 ML: 5 INJECTION INTRAVENOUS at 21:28

## 2024-06-11 RX ADMIN — POLYVINYL ALCOHOL, POVIDONE 2 DROP: 14; 6 SOLUTION/ DROPS OPHTHALMIC at 15:33

## 2024-06-11 RX ADMIN — CARBIDOPA AND LEVODOPA 1 TABLET: 25; 100 TABLET ORAL at 09:29

## 2024-06-11 RX ADMIN — GABAPENTIN 800 MG: 400 CAPSULE ORAL at 15:33

## 2024-06-11 RX ADMIN — VALPROATE SODIUM 750 MG: 100 INJECTION, SOLUTION INTRAVENOUS at 12:17

## 2024-06-11 RX ADMIN — QUETIAPINE FUMARATE 200 MG: 100 TABLET ORAL at 09:17

## 2024-06-11 RX ADMIN — POLYVINYL ALCOHOL, POVIDONE 2 DROP: 14; 6 SOLUTION/ DROPS OPHTHALMIC at 21:26

## 2024-06-11 RX ADMIN — GABAPENTIN 800 MG: 400 CAPSULE ORAL at 21:25

## 2024-06-11 RX ADMIN — DESMOPRESSIN ACETATE 5 MCG: 4 INJECTION INTRAVENOUS; SUBCUTANEOUS at 22:59

## 2024-06-11 RX ADMIN — SODIUM CHLORIDE, PRESERVATIVE FREE 10 ML: 5 INJECTION INTRAVENOUS at 09:17

## 2024-06-11 RX ADMIN — ATORVASTATIN CALCIUM 10 MG: 10 TABLET, FILM COATED ORAL at 21:26

## 2024-06-11 RX ADMIN — SODIUM CHLORIDE, PRESERVATIVE FREE 10 ML: 5 INJECTION INTRAVENOUS at 21:27

## 2024-06-11 RX ADMIN — DOCUSATE SODIUM 100 MG: 100 CAPSULE, LIQUID FILLED ORAL at 18:27

## 2024-06-11 RX ADMIN — TAMSULOSIN HYDROCHLORIDE 0.4 MG: 0.4 CAPSULE ORAL at 21:26

## 2024-06-11 RX ADMIN — AZATHIOPRINE 150 MG: 50 TABLET ORAL at 09:19

## 2024-06-11 RX ADMIN — SODIUM CHLORIDE, PRESERVATIVE FREE 10 ML: 5 INJECTION INTRAVENOUS at 09:23

## 2024-06-11 RX ADMIN — VALPROATE SODIUM 750 MG: 100 INJECTION, SOLUTION INTRAVENOUS at 00:28

## 2024-06-11 RX ADMIN — POLYVINYL ALCOHOL, POVIDONE 2 DROP: 14; 6 SOLUTION/ DROPS OPHTHALMIC at 09:19

## 2024-06-11 RX ADMIN — MEDROXYPROGESTERONE ACETATE 10 MG: 10 TABLET ORAL at 09:19

## 2024-06-11 RX ADMIN — HYDRALAZINE HYDROCHLORIDE 10 MG: 20 INJECTION INTRAMUSCULAR; INTRAVENOUS at 23:47

## 2024-06-11 RX ADMIN — QUETIAPINE FUMARATE 200 MG: 100 TABLET ORAL at 21:26

## 2024-06-11 RX ADMIN — IPRATROPIUM BROMIDE AND ALBUTEROL SULFATE 1 DOSE: 2.5; .5 SOLUTION RESPIRATORY (INHALATION) at 12:54

## 2024-06-11 ASSESSMENT — PULMONARY FUNCTION TESTS
PIF_VALUE: 22
PIF_VALUE: 17
PIF_VALUE: 20
PIF_VALUE: 25
PIF_VALUE: 21
PIF_VALUE: 17
PIF_VALUE: 20
PIF_VALUE: 20
PIF_VALUE: 21
PIF_VALUE: 16
PIF_VALUE: 19
PIF_VALUE: 16
PIF_VALUE: 17
PIF_VALUE: 17
PIF_VALUE: 21
PIF_VALUE: 22
PIF_VALUE: 25
PIF_VALUE: 20
PIF_VALUE: 22
PIF_VALUE: 23
PIF_VALUE: 17
PIF_VALUE: 23
PIF_VALUE: 31
PIF_VALUE: 16
PIF_VALUE: 17
PIF_VALUE: 16
PIF_VALUE: 19
PIF_VALUE: 16

## 2024-06-11 ASSESSMENT — PAIN SCALES - GENERAL
PAINLEVEL_OUTOF10: 0

## 2024-06-11 NOTE — CARE COORDINATION
Care Coordination LOS 9. Remains intubated 6/6/24, Propofol, restraints, NG/TF. Full code. Nephrology following for hypernatremia, Neurology for AMS. Psych was initially consulted and signed off.  Pt was from [facility] prior for suicide attempt, is also .   Child Krish is primary decision maker. Per palliative care note, Krish was estranged from Jose G but is willing to make decisions until pt can obtain a POA.  Jose G identifies as female.   Discharge needs unclear.  Plan in place prior to transfer was [facility] per [facility]. Will follow    Electronically signed by Mary Mccallum RN on 6/11/2024 at 2:51 PM

## 2024-06-11 NOTE — ACP (ADVANCE CARE PLANNING)
Advance Care Planning     Palliative Team Advance Care Planning (ACP) Conversation    Date of Conversation: 06/11/24    Individuals present for the conversation:  chart review for advance directives     ACP documents on file prior to discussion:  -Power of  for Healthcare    Previously completed document/s not on file: Not discussed.    Healthcare Decision Maker:    Primary Decision Maker: Krish Will - Child - [phone]     Conversation Summary:  [facility] of [facility] verified in soft chart. Krish Will is only listed agent.Pt is intubated and unable to participate in discussion.    Resuscitation Status:   Code Status: Full Code     Documentation Completed:  -No new documents completed.    I spent 15 minutes with the patient and/or surrogate decision maker discussing the patient's wishes and goals.      FARIBA Castaneda

## 2024-06-12 ENCOUNTER — APPOINTMENT (OUTPATIENT)
Dept: GENERAL RADIOLOGY | Age: 59
DRG: 004 | End: 2024-06-12
Payer: MEDICARE

## 2024-06-12 PROBLEM — Z51.5 PALLIATIVE CARE ENCOUNTER: Status: ACTIVE | Noted: 2024-06-12

## 2024-06-12 LAB
AADO2: 110.5 MMHG
ACHR BIND AB SER-SCNC: 1.7 NMOL/L (ref 0–0.4)
ACHR BLOCK AB/ACHR TOTAL SFR SER: 9 % (ref 0–26)
ALBUMIN SERPL-MCNC: 2.6 G/DL (ref 3.5–5.2)
ALP SERPL-CCNC: 92 U/L (ref 40–129)
ALT SERPL-CCNC: 9 U/L (ref 0–40)
ANION GAP SERPL CALCULATED.3IONS-SCNC: 10 MMOL/L (ref 7–16)
AST SERPL-CCNC: 17 U/L (ref 0–39)
B.E.: 1.9 MMOL/L (ref -3–3)
BASOPHILS # BLD: 0.02 K/UL (ref 0–0.2)
BASOPHILS NFR BLD: 0 % (ref 0–2)
BILIRUB SERPL-MCNC: 0.3 MG/DL (ref 0–1.2)
BUN SERPL-MCNC: 20 MG/DL (ref 6–23)
CALCIUM SERPL-MCNC: 9.2 MG/DL (ref 8.6–10.2)
CHLORIDE SERPL-SCNC: 111 MMOL/L (ref 98–107)
CO2 SERPL-SCNC: 27 MMOL/L (ref 22–29)
COHB: 0.1 % (ref 0–1.5)
CREAT SERPL-MCNC: 0.8 MG/DL (ref 0.7–1.2)
CRITICAL: ABNORMAL
DATE ANALYZED: ABNORMAL
DATE LAST DOSE: NORMAL
DATE OF COLLECTION: ABNORMAL
EOSINOPHIL # BLD: 0.16 K/UL (ref 0.05–0.5)
EOSINOPHILS RELATIVE PERCENT: 2 % (ref 0–6)
ERYTHROCYTE [DISTWIDTH] IN BLOOD BY AUTOMATED COUNT: 15.2 % (ref 11.5–15)
FIO2: 35 %
GFR, ESTIMATED: >90 ML/MIN/1.73M2
GLUCOSE SERPL-MCNC: 267 MG/DL (ref 74–99)
HCO3: 24.6 MMOL/L (ref 22–26)
HCT VFR BLD AUTO: 25.4 % (ref 37–54)
HGB BLD-MCNC: 7.6 G/DL (ref 12.5–16.5)
HHB: 2.5 % (ref 0–5)
IMM GRANULOCYTES # BLD AUTO: 0.43 K/UL (ref 0–0.58)
IMM GRANULOCYTES NFR BLD: 5 % (ref 0–5)
INR PPP: 1.3
LAB: ABNORMAL
LYMPHOCYTES NFR BLD: 0.94 K/UL (ref 1.5–4)
LYMPHOCYTES RELATIVE PERCENT: 11 % (ref 20–42)
Lab: 540
MAGNESIUM SERPL-MCNC: 2.1 MG/DL (ref 1.6–2.6)
MCH RBC QN AUTO: 32.2 PG (ref 26–35)
MCHC RBC AUTO-ENTMCNC: 29.9 G/DL (ref 32–34.5)
MCV RBC AUTO: 107.6 FL (ref 80–99.9)
METHB: 0.5 % (ref 0–1.5)
MODE: AC
MONOCYTES NFR BLD: 0.75 K/UL (ref 0.1–0.95)
MONOCYTES NFR BLD: 9 % (ref 2–12)
NEUTROPHILS NFR BLD: 73 % (ref 43–80)
NEUTS SEG NFR BLD: 6.34 K/UL (ref 1.8–7.3)
O2 SATURATION: 98.2 % (ref 92–98.5)
O2HB: 96.9 % (ref 94–97)
OPERATOR ID: 3214
PARTIAL THROMBOPLASTIN TIME: 37.3 SEC (ref 24.5–35.1)
PATIENT TEMP: 37 C
PCO2: 31 MMHG (ref 35–45)
PEEP/CPAP: 8 CMH2O
PFO2: 2.94 MMHG/%
PH BLOOD GAS: 7.52 (ref 7.35–7.45)
PHOSPHATE SERPL-MCNC: 2.8 MG/DL (ref 2.5–4.5)
PLATELET # BLD AUTO: 186 K/UL (ref 130–450)
PMV BLD AUTO: 10.9 FL (ref 7–12)
PO2: 103 MMHG (ref 75–100)
POTASSIUM SERPL-SCNC: 3.9 MMOL/L (ref 3.5–5)
PROT SERPL-MCNC: 5.4 G/DL (ref 6.4–8.3)
PROTHROMBIN TIME: 14.6 SEC (ref 9.3–12.4)
RBC # BLD AUTO: 2.36 M/UL (ref 3.8–5.8)
RI(T): 1.07
RR MECHANICAL: 24 B/MIN
SODIUM SERPL-SCNC: 148 MMOL/L (ref 132–146)
SOURCE, BLOOD GAS: ABNORMAL
THB: 9.3 G/DL (ref 11.5–16.5)
TIME ANALYZED: 541
TME LAST DOSE: NORMAL H
VANCOMYCIN DOSE: NORMAL MG
VANCOMYCIN SERPL-MCNC: 6.5 UG/ML (ref 5–40)
VT MECHANICAL: 460 ML
WBC OTHER # BLD: 8.6 K/UL (ref 4.5–11.5)

## 2024-06-12 PROCEDURE — 99232 SBSQ HOSP IP/OBS MODERATE 35: CPT | Performed by: FAMILY MEDICINE

## 2024-06-12 PROCEDURE — 6360000002 HC RX W HCPCS

## 2024-06-12 PROCEDURE — 84100 ASSAY OF PHOSPHORUS: CPT

## 2024-06-12 PROCEDURE — 6370000000 HC RX 637 (ALT 250 FOR IP): Performed by: INTERNAL MEDICINE

## 2024-06-12 PROCEDURE — 2580000003 HC RX 258: Performed by: INTERNAL MEDICINE

## 2024-06-12 PROCEDURE — 2580000003 HC RX 258: Performed by: STUDENT IN AN ORGANIZED HEALTH CARE EDUCATION/TRAINING PROGRAM

## 2024-06-12 PROCEDURE — 6370000000 HC RX 637 (ALT 250 FOR IP)

## 2024-06-12 PROCEDURE — 85610 PROTHROMBIN TIME: CPT

## 2024-06-12 PROCEDURE — 6370000000 HC RX 637 (ALT 250 FOR IP): Performed by: NURSE PRACTITIONER

## 2024-06-12 PROCEDURE — 2580000003 HC RX 258

## 2024-06-12 PROCEDURE — 94640 AIRWAY INHALATION TREATMENT: CPT

## 2024-06-12 PROCEDURE — 83735 ASSAY OF MAGNESIUM: CPT

## 2024-06-12 PROCEDURE — 76937 US GUIDE VASCULAR ACCESS: CPT

## 2024-06-12 PROCEDURE — 99232 SBSQ HOSP IP/OBS MODERATE 35: CPT | Performed by: PHYSICIAN ASSISTANT

## 2024-06-12 PROCEDURE — C1751 CATH, INF, PER/CENT/MIDLINE: HCPCS

## 2024-06-12 PROCEDURE — 6360000002 HC RX W HCPCS: Performed by: INTERNAL MEDICINE

## 2024-06-12 PROCEDURE — 2000000000 HC ICU R&B

## 2024-06-12 PROCEDURE — 85730 THROMBOPLASTIN TIME PARTIAL: CPT

## 2024-06-12 PROCEDURE — 94003 VENT MGMT INPAT SUBQ DAY: CPT

## 2024-06-12 PROCEDURE — 82805 BLOOD GASES W/O2 SATURATION: CPT

## 2024-06-12 PROCEDURE — 2500000003 HC RX 250 WO HCPCS: Performed by: INTERNAL MEDICINE

## 2024-06-12 PROCEDURE — 6360000002 HC RX W HCPCS: Performed by: STUDENT IN AN ORGANIZED HEALTH CARE EDUCATION/TRAINING PROGRAM

## 2024-06-12 PROCEDURE — 99231 SBSQ HOSP IP/OBS SF/LOW 25: CPT | Performed by: CLINICAL NURSE SPECIALIST

## 2024-06-12 PROCEDURE — 80202 ASSAY OF VANCOMYCIN: CPT

## 2024-06-12 PROCEDURE — 80053 COMPREHEN METABOLIC PANEL: CPT

## 2024-06-12 PROCEDURE — 99291 CRITICAL CARE FIRST HOUR: CPT | Performed by: INTERNAL MEDICINE

## 2024-06-12 PROCEDURE — C9113 INJ PANTOPRAZOLE SODIUM, VIA: HCPCS

## 2024-06-12 PROCEDURE — 71045 X-RAY EXAM CHEST 1 VIEW: CPT

## 2024-06-12 PROCEDURE — 36569 INSJ PICC 5 YR+ W/O IMAGING: CPT

## 2024-06-12 PROCEDURE — 85025 COMPLETE CBC W/AUTO DIFF WBC: CPT

## 2024-06-12 PROCEDURE — 6360000002 HC RX W HCPCS: Performed by: NURSE PRACTITIONER

## 2024-06-12 RX ORDER — DESMOPRESSIN ACETATE 4 UG/ML
10 INJECTION, SOLUTION INTRAVENOUS; SUBCUTANEOUS 2 TIMES DAILY
Status: DISCONTINUED | OUTPATIENT
Start: 2024-06-12 | End: 2024-06-18

## 2024-06-12 RX ADMIN — CARBIDOPA AND LEVODOPA 1 TABLET: 25; 100 TABLET ORAL at 08:06

## 2024-06-12 RX ADMIN — AZATHIOPRINE 150 MG: 50 TABLET ORAL at 08:06

## 2024-06-12 RX ADMIN — VANCOMYCIN HYDROCHLORIDE 1750 MG: 10 INJECTION, POWDER, LYOPHILIZED, FOR SOLUTION INTRAVENOUS at 20:30

## 2024-06-12 RX ADMIN — POLYVINYL ALCOHOL, POVIDONE 2 DROP: 14; 6 SOLUTION/ DROPS OPHTHALMIC at 02:54

## 2024-06-12 RX ADMIN — APIXABAN 2.5 MG: 5 TABLET, FILM COATED ORAL at 21:28

## 2024-06-12 RX ADMIN — SODIUM CHLORIDE, PRESERVATIVE FREE 40 MG: 5 INJECTION INTRAVENOUS at 08:07

## 2024-06-12 RX ADMIN — ACETAMINOPHEN 650 MG: 325 TABLET ORAL at 08:06

## 2024-06-12 RX ADMIN — TAMSULOSIN HYDROCHLORIDE 0.4 MG: 0.4 CAPSULE ORAL at 21:41

## 2024-06-12 RX ADMIN — FINASTERIDE 5 MG: 5 TABLET, FILM COATED ORAL at 08:06

## 2024-06-12 RX ADMIN — CHLORHEXIDINE GLUCONATE, 0.12% ORAL RINSE 15 ML: 1.2 SOLUTION DENTAL at 21:29

## 2024-06-12 RX ADMIN — GABAPENTIN 800 MG: 400 CAPSULE ORAL at 08:06

## 2024-06-12 RX ADMIN — DESMOPRESSIN ACETATE 10 MCG: 4 INJECTION, SOLUTION INTRAVENOUS; SUBCUTANEOUS at 22:04

## 2024-06-12 RX ADMIN — POLYVINYL ALCOHOL, POVIDONE 2 DROP: 14; 6 SOLUTION/ DROPS OPHTHALMIC at 06:54

## 2024-06-12 RX ADMIN — GABAPENTIN 800 MG: 400 CAPSULE ORAL at 21:40

## 2024-06-12 RX ADMIN — IPRATROPIUM BROMIDE AND ALBUTEROL SULFATE 1 DOSE: 2.5; .5 SOLUTION RESPIRATORY (INHALATION) at 08:26

## 2024-06-12 RX ADMIN — VALPROATE SODIUM 750 MG: 100 INJECTION, SOLUTION INTRAVENOUS at 00:22

## 2024-06-12 RX ADMIN — CHLORHEXIDINE GLUCONATE, 0.12% ORAL RINSE 15 ML: 1.2 SOLUTION DENTAL at 08:06

## 2024-06-12 RX ADMIN — OXYBUTYNIN CHLORIDE 5 MG: 5 TABLET ORAL at 08:06

## 2024-06-12 RX ADMIN — VALPROATE SODIUM 750 MG: 100 INJECTION, SOLUTION INTRAVENOUS at 13:03

## 2024-06-12 RX ADMIN — APIXABAN 2.5 MG: 5 TABLET, FILM COATED ORAL at 08:06

## 2024-06-12 RX ADMIN — OXYBUTYNIN CHLORIDE 5 MG: 5 TABLET ORAL at 21:28

## 2024-06-12 RX ADMIN — VANCOMYCIN HYDROCHLORIDE 1750 MG: 10 INJECTION, POWDER, LYOPHILIZED, FOR SOLUTION INTRAVENOUS at 08:29

## 2024-06-12 RX ADMIN — POLYVINYL ALCOHOL, POVIDONE 2 DROP: 14; 6 SOLUTION/ DROPS OPHTHALMIC at 13:03

## 2024-06-12 RX ADMIN — CARBIDOPA AND LEVODOPA 1 TABLET: 25; 100 TABLET ORAL at 13:48

## 2024-06-12 RX ADMIN — CARBIDOPA AND LEVODOPA 1 TABLET: 25; 100 TABLET ORAL at 21:40

## 2024-06-12 RX ADMIN — POLYVINYL ALCOHOL, POVIDONE 2 DROP: 14; 6 SOLUTION/ DROPS OPHTHALMIC at 10:03

## 2024-06-12 RX ADMIN — QUETIAPINE FUMARATE 200 MG: 100 TABLET ORAL at 08:07

## 2024-06-12 RX ADMIN — QUETIAPINE FUMARATE 200 MG: 100 TABLET ORAL at 21:27

## 2024-06-12 RX ADMIN — GABAPENTIN 800 MG: 400 CAPSULE ORAL at 13:48

## 2024-06-12 RX ADMIN — ACETAMINOPHEN 650 MG: 325 TABLET ORAL at 00:21

## 2024-06-12 RX ADMIN — DESMOPRESSIN ACETATE 5 MCG: 4 INJECTION INTRAVENOUS; SUBCUTANEOUS at 08:17

## 2024-06-12 RX ADMIN — DOCUSATE SODIUM 100 MG: 100 CAPSULE, LIQUID FILLED ORAL at 08:07

## 2024-06-12 RX ADMIN — ATORVASTATIN CALCIUM 10 MG: 10 TABLET, FILM COATED ORAL at 21:27

## 2024-06-12 RX ADMIN — POLYVINYL ALCOHOL, POVIDONE 2 DROP: 14; 6 SOLUTION/ DROPS OPHTHALMIC at 17:39

## 2024-06-12 RX ADMIN — SODIUM CHLORIDE, PRESERVATIVE FREE 10 ML: 5 INJECTION INTRAVENOUS at 08:07

## 2024-06-12 RX ADMIN — ACETAMINOPHEN 650 MG: 325 TABLET ORAL at 14:15

## 2024-06-12 ASSESSMENT — PULMONARY FUNCTION TESTS
PIF_VALUE: 16
PIF_VALUE: 14
PIF_VALUE: 18
PIF_VALUE: 17
PIF_VALUE: 16
PIF_VALUE: 20
PIF_VALUE: 19
PIF_VALUE: 18
PIF_VALUE: 16
PIF_VALUE: 22
PIF_VALUE: 15
PIF_VALUE: 17
PIF_VALUE: 14
PIF_VALUE: 22
PIF_VALUE: 15
PIF_VALUE: 13
PIF_VALUE: 17
PIF_VALUE: 19
PIF_VALUE: 11
PIF_VALUE: 17
PIF_VALUE: 19
PIF_VALUE: 18
PIF_VALUE: 15
PIF_VALUE: 18
PIF_VALUE: 17
PIF_VALUE: 24
PIF_VALUE: 15

## 2024-06-12 ASSESSMENT — PAIN SCALES - GENERAL
PAINLEVEL_OUTOF10: 0

## 2024-06-13 ENCOUNTER — APPOINTMENT (OUTPATIENT)
Dept: GENERAL RADIOLOGY | Age: 59
DRG: 004 | End: 2024-06-13
Payer: MEDICARE

## 2024-06-13 ENCOUNTER — APPOINTMENT (OUTPATIENT)
Dept: CT IMAGING | Age: 59
DRG: 004 | End: 2024-06-13
Payer: MEDICARE

## 2024-06-13 PROBLEM — Z71.89 GOALS OF CARE, COUNSELING/DISCUSSION: Status: ACTIVE | Noted: 2024-06-13

## 2024-06-13 LAB
AADO2: 123.2 MMHG
ALBUMIN SERPL-MCNC: 2.5 G/DL (ref 3.5–5.2)
ALP SERPL-CCNC: 79 U/L (ref 40–129)
ALT SERPL-CCNC: <5 U/L (ref 0–40)
ANION GAP SERPL CALCULATED.3IONS-SCNC: 11 MMOL/L (ref 7–16)
AST SERPL-CCNC: 14 U/L (ref 0–39)
B.E.: 4.1 MMOL/L (ref -3–3)
BASOPHILS # BLD: 0 K/UL (ref 0–0.2)
BASOPHILS NFR BLD: 0 % (ref 0–2)
BILIRUB SERPL-MCNC: 0.2 MG/DL (ref 0–1.2)
BUN SERPL-MCNC: 21 MG/DL (ref 6–23)
CALCIUM SERPL-MCNC: 9.2 MG/DL (ref 8.6–10.2)
CHLORIDE SERPL-SCNC: 114 MMOL/L (ref 98–107)
CO2 SERPL-SCNC: 23 MMOL/L (ref 22–29)
COHB: 0.7 % (ref 0–1.5)
CREAT SERPL-MCNC: 0.7 MG/DL (ref 0.7–1.2)
CRITICAL: ABNORMAL
DATE ANALYZED: ABNORMAL
DATE OF COLLECTION: ABNORMAL
EOSINOPHIL # BLD: 0.13 K/UL (ref 0.05–0.5)
EOSINOPHILS RELATIVE PERCENT: 2 % (ref 0–6)
ERYTHROCYTE [DISTWIDTH] IN BLOOD BY AUTOMATED COUNT: 15 % (ref 11.5–15)
FIO2: 35 %
GFR, ESTIMATED: >90 ML/MIN/1.73M2
GLUCOSE SERPL-MCNC: 197 MG/DL (ref 74–99)
HCO3: 27.7 MMOL/L (ref 22–26)
HCT VFR BLD AUTO: 23.9 % (ref 37–54)
HGB BLD-MCNC: 7.1 G/DL (ref 12.5–16.5)
HHB: 3.8 % (ref 0–5)
INR PPP: 1.3
LAB: ABNORMAL
LYMPHOCYTES NFR BLD: 0.63 K/UL (ref 1.5–4)
LYMPHOCYTES RELATIVE PERCENT: 9 % (ref 20–42)
Lab: 447
MAGNESIUM SERPL-MCNC: 2 MG/DL (ref 1.6–2.6)
MCH RBC QN AUTO: 32.7 PG (ref 26–35)
MCHC RBC AUTO-ENTMCNC: 29.7 G/DL (ref 32–34.5)
MCV RBC AUTO: 110.1 FL (ref 80–99.9)
METAMYELOCYTES ABSOLUTE COUNT: 0.13 K/UL (ref 0–0.12)
METAMYELOCYTES: 2 % (ref 0–1)
METHB: 0.6 % (ref 0–1.5)
MODE: AC
MONOCYTES NFR BLD: 0.31 K/UL (ref 0.1–0.95)
MONOCYTES NFR BLD: 4 % (ref 2–12)
MYELOCYTES ABSOLUTE COUNT: 0.19 K/UL
MYELOCYTES: 3 %
NEUTROPHILS NFR BLD: 81 % (ref 43–80)
NEUTS SEG NFR BLD: 5.82 K/UL (ref 1.8–7.3)
O2 SATURATION: 97 % (ref 92–98.5)
O2HB: 94.9 % (ref 94–97)
OPERATOR ID: 7490
PARTIAL THROMBOPLASTIN TIME: 32.1 SEC (ref 24.5–35.1)
PATIENT TEMP: 37 C
PCO2: 36.7 MMHG (ref 35–45)
PEEP/CPAP: 8 CMH2O
PFO2: 2.39 MMHG/%
PH BLOOD GAS: 7.5 (ref 7.35–7.45)
PHOSPHATE SERPL-MCNC: 2.9 MG/DL (ref 2.5–4.5)
PLATELET # BLD AUTO: 171 K/UL (ref 130–450)
PMV BLD AUTO: 11 FL (ref 7–12)
PO2: 83.7 MMHG (ref 75–100)
POTASSIUM SERPL-SCNC: 3.9 MMOL/L (ref 3.5–5)
PROT SERPL-MCNC: 4.9 G/DL (ref 6.4–8.3)
PROTHROMBIN TIME: 14.3 SEC (ref 9.3–12.4)
RBC # BLD AUTO: 2.17 M/UL (ref 3.8–5.8)
RBC # BLD: ABNORMAL 10*6/UL
RI(T): 1.47
RR MECHANICAL: 12 B/MIN
SODIUM SERPL-SCNC: 148 MMOL/L (ref 132–146)
SOURCE, BLOOD GAS: ABNORMAL
THB: 7.8 G/DL (ref 11.5–16.5)
TIME ANALYZED: 450
VT MECHANICAL: 430 ML
WBC OTHER # BLD: 7.2 K/UL (ref 4.5–11.5)

## 2024-06-13 PROCEDURE — 85610 PROTHROMBIN TIME: CPT

## 2024-06-13 PROCEDURE — 2580000003 HC RX 258

## 2024-06-13 PROCEDURE — 80053 COMPREHEN METABOLIC PANEL: CPT

## 2024-06-13 PROCEDURE — 6360000002 HC RX W HCPCS: Performed by: NURSE PRACTITIONER

## 2024-06-13 PROCEDURE — 99231 SBSQ HOSP IP/OBS SF/LOW 25: CPT | Performed by: CLINICAL NURSE SPECIALIST

## 2024-06-13 PROCEDURE — 70450 CT HEAD/BRAIN W/O DYE: CPT

## 2024-06-13 PROCEDURE — 85730 THROMBOPLASTIN TIME PARTIAL: CPT

## 2024-06-13 PROCEDURE — 99232 SBSQ HOSP IP/OBS MODERATE 35: CPT | Performed by: PHYSICIAN ASSISTANT

## 2024-06-13 PROCEDURE — 2000000000 HC ICU R&B

## 2024-06-13 PROCEDURE — 83735 ASSAY OF MAGNESIUM: CPT

## 2024-06-13 PROCEDURE — 6360000002 HC RX W HCPCS: Performed by: INTERNAL MEDICINE

## 2024-06-13 PROCEDURE — 2580000003 HC RX 258: Performed by: INTERNAL MEDICINE

## 2024-06-13 PROCEDURE — 6370000000 HC RX 637 (ALT 250 FOR IP): Performed by: NURSE PRACTITIONER

## 2024-06-13 PROCEDURE — 2580000003 HC RX 258: Performed by: STUDENT IN AN ORGANIZED HEALTH CARE EDUCATION/TRAINING PROGRAM

## 2024-06-13 PROCEDURE — C9113 INJ PANTOPRAZOLE SODIUM, VIA: HCPCS

## 2024-06-13 PROCEDURE — 84100 ASSAY OF PHOSPHORUS: CPT

## 2024-06-13 PROCEDURE — 85025 COMPLETE CBC W/AUTO DIFF WBC: CPT

## 2024-06-13 PROCEDURE — 6360000002 HC RX W HCPCS

## 2024-06-13 PROCEDURE — 6370000000 HC RX 637 (ALT 250 FOR IP): Performed by: INTERNAL MEDICINE

## 2024-06-13 PROCEDURE — 71045 X-RAY EXAM CHEST 1 VIEW: CPT

## 2024-06-13 PROCEDURE — 99291 CRITICAL CARE FIRST HOUR: CPT | Performed by: INTERNAL MEDICINE

## 2024-06-13 PROCEDURE — 99231 SBSQ HOSP IP/OBS SF/LOW 25: CPT | Performed by: FAMILY MEDICINE

## 2024-06-13 PROCEDURE — 94003 VENT MGMT INPAT SUBQ DAY: CPT

## 2024-06-13 PROCEDURE — 2500000003 HC RX 250 WO HCPCS: Performed by: INTERNAL MEDICINE

## 2024-06-13 PROCEDURE — 6370000000 HC RX 637 (ALT 250 FOR IP)

## 2024-06-13 PROCEDURE — 82805 BLOOD GASES W/O2 SATURATION: CPT

## 2024-06-13 RX ADMIN — SODIUM CHLORIDE, PRESERVATIVE FREE 10 ML: 5 INJECTION INTRAVENOUS at 19:44

## 2024-06-13 RX ADMIN — POLYVINYL ALCOHOL, POVIDONE 2 DROP: 14; 6 SOLUTION/ DROPS OPHTHALMIC at 14:27

## 2024-06-13 RX ADMIN — DOCUSATE SODIUM 100 MG: 100 CAPSULE, LIQUID FILLED ORAL at 08:52

## 2024-06-13 RX ADMIN — APIXABAN 2.5 MG: 5 TABLET, FILM COATED ORAL at 08:53

## 2024-06-13 RX ADMIN — POLYVINYL ALCOHOL, POVIDONE 2 DROP: 14; 6 SOLUTION/ DROPS OPHTHALMIC at 01:14

## 2024-06-13 RX ADMIN — GABAPENTIN 800 MG: 400 CAPSULE ORAL at 14:27

## 2024-06-13 RX ADMIN — QUETIAPINE FUMARATE 200 MG: 100 TABLET ORAL at 08:53

## 2024-06-13 RX ADMIN — POLYVINYL ALCOHOL, POVIDONE 2 DROP: 14; 6 SOLUTION/ DROPS OPHTHALMIC at 17:19

## 2024-06-13 RX ADMIN — ACETAMINOPHEN 650 MG: 325 TABLET ORAL at 12:21

## 2024-06-13 RX ADMIN — DESMOPRESSIN ACETATE 10 MCG: 4 INJECTION, SOLUTION INTRAVENOUS; SUBCUTANEOUS at 08:52

## 2024-06-13 RX ADMIN — QUETIAPINE FUMARATE 200 MG: 100 TABLET ORAL at 19:43

## 2024-06-13 RX ADMIN — CARBIDOPA AND LEVODOPA 1 TABLET: 25; 100 TABLET ORAL at 14:27

## 2024-06-13 RX ADMIN — VALPROATE SODIUM 750 MG: 100 INJECTION, SOLUTION INTRAVENOUS at 12:24

## 2024-06-13 RX ADMIN — VANCOMYCIN HYDROCHLORIDE 1750 MG: 10 INJECTION, POWDER, LYOPHILIZED, FOR SOLUTION INTRAVENOUS at 22:53

## 2024-06-13 RX ADMIN — SODIUM CHLORIDE, PRESERVATIVE FREE 10 ML: 5 INJECTION INTRAVENOUS at 08:54

## 2024-06-13 RX ADMIN — SODIUM CHLORIDE, PRESERVATIVE FREE 40 MG: 5 INJECTION INTRAVENOUS at 08:53

## 2024-06-13 RX ADMIN — VALPROATE SODIUM 750 MG: 100 INJECTION, SOLUTION INTRAVENOUS at 00:33

## 2024-06-13 RX ADMIN — CARBIDOPA AND LEVODOPA 1 TABLET: 25; 100 TABLET ORAL at 19:43

## 2024-06-13 RX ADMIN — OXYBUTYNIN CHLORIDE 5 MG: 5 TABLET ORAL at 19:43

## 2024-06-13 RX ADMIN — ATORVASTATIN CALCIUM 10 MG: 10 TABLET, FILM COATED ORAL at 19:43

## 2024-06-13 RX ADMIN — VANCOMYCIN HYDROCHLORIDE 1750 MG: 10 INJECTION, POWDER, LYOPHILIZED, FOR SOLUTION INTRAVENOUS at 08:55

## 2024-06-13 RX ADMIN — HYDRALAZINE HYDROCHLORIDE 10 MG: 20 INJECTION INTRAMUSCULAR; INTRAVENOUS at 08:21

## 2024-06-13 RX ADMIN — DESMOPRESSIN ACETATE 10 MCG: 4 INJECTION, SOLUTION INTRAVENOUS; SUBCUTANEOUS at 19:50

## 2024-06-13 RX ADMIN — AZATHIOPRINE 150 MG: 50 TABLET ORAL at 08:53

## 2024-06-13 RX ADMIN — FINASTERIDE 5 MG: 5 TABLET, FILM COATED ORAL at 08:53

## 2024-06-13 RX ADMIN — CHLORHEXIDINE GLUCONATE, 0.12% ORAL RINSE 15 ML: 1.2 SOLUTION DENTAL at 08:21

## 2024-06-13 RX ADMIN — HEPARIN 300 UNITS: 100 SYRINGE at 19:59

## 2024-06-13 RX ADMIN — POLYVINYL ALCOHOL, POVIDONE 2 DROP: 14; 6 SOLUTION/ DROPS OPHTHALMIC at 08:53

## 2024-06-13 RX ADMIN — HEPARIN 300 UNITS: 100 SYRINGE at 19:58

## 2024-06-13 RX ADMIN — CHLORHEXIDINE GLUCONATE, 0.12% ORAL RINSE 15 ML: 1.2 SOLUTION DENTAL at 19:43

## 2024-06-13 RX ADMIN — APIXABAN 2.5 MG: 5 TABLET, FILM COATED ORAL at 19:43

## 2024-06-13 RX ADMIN — OXYBUTYNIN CHLORIDE 5 MG: 5 TABLET ORAL at 08:53

## 2024-06-13 RX ADMIN — GABAPENTIN 800 MG: 400 CAPSULE ORAL at 19:43

## 2024-06-13 RX ADMIN — CARBIDOPA AND LEVODOPA 1 TABLET: 25; 100 TABLET ORAL at 08:52

## 2024-06-13 RX ADMIN — TAMSULOSIN HYDROCHLORIDE 0.4 MG: 0.4 CAPSULE ORAL at 19:58

## 2024-06-13 RX ADMIN — GABAPENTIN 800 MG: 400 CAPSULE ORAL at 08:52

## 2024-06-13 ASSESSMENT — PULMONARY FUNCTION TESTS
PIF_VALUE: 22
PIF_VALUE: 17
PIF_VALUE: 12
PIF_VALUE: 18
PIF_VALUE: 18
PIF_VALUE: 15
PIF_VALUE: 24
PIF_VALUE: 18
PIF_VALUE: 11
PIF_VALUE: 24
PIF_VALUE: 24
PIF_VALUE: 16
PIF_VALUE: 16
PIF_VALUE: 23
PIF_VALUE: 1
PIF_VALUE: 13
PIF_VALUE: 22
PIF_VALUE: 12
PIF_VALUE: 12
PIF_VALUE: 17
PIF_VALUE: 18
PIF_VALUE: 13
PIF_VALUE: 16
PIF_VALUE: 22
PIF_VALUE: 20
PIF_VALUE: 18
PIF_VALUE: 18
PIF_VALUE: 20
PIF_VALUE: 18

## 2024-06-13 ASSESSMENT — PAIN SCALES - GENERAL
PAINLEVEL_OUTOF10: 4
PAINLEVEL_OUTOF10: 0

## 2024-06-13 NOTE — CARE COORDINATION
Care Coordination: LOS 11 day.  Neurology following, remains intubated, off sedation- no improvement in mental status.  Unable to fill  out waiver for MRI brain- family unable to answer questions and too large for MRI. ID following for Parainfluenza, Vanc q 24.   EEG from 6/7/24- Results on chart per noted 6/7/24 at 10:59.  [facility]   ext [phone], she is still following. She has no ICU beds, she asked to keep her updated.  If she accepts will need son to sign off on transfer as he is listed as POA [facility].  Will follow    Electronically signed by Mary Mccallum RN on 6/13/2024 at 9:20 AM

## 2024-06-14 ENCOUNTER — APPOINTMENT (OUTPATIENT)
Dept: GENERAL RADIOLOGY | Age: 59
DRG: 004 | End: 2024-06-14
Payer: MEDICARE

## 2024-06-14 LAB
AADO2: 123.1 MMHG
ALBUMIN SERPL-MCNC: 2.3 G/DL (ref 3.5–5.2)
ALP SERPL-CCNC: 79 U/L (ref 40–129)
ALT SERPL-CCNC: 8 U/L (ref 0–40)
ANION GAP SERPL CALCULATED.3IONS-SCNC: 9 MMOL/L (ref 7–16)
AST SERPL-CCNC: 19 U/L (ref 0–39)
B.E.: 1.4 MMOL/L (ref -3–3)
BASOPHILS # BLD: 0.02 K/UL (ref 0–0.2)
BASOPHILS NFR BLD: 0 % (ref 0–2)
BILIRUB SERPL-MCNC: 0.4 MG/DL (ref 0–1.2)
BUN SERPL-MCNC: 21 MG/DL (ref 6–23)
CALCIUM SERPL-MCNC: 9.1 MG/DL (ref 8.6–10.2)
CHLORIDE SERPL-SCNC: 114 MMOL/L (ref 98–107)
CO2 SERPL-SCNC: 23 MMOL/L (ref 22–29)
COHB: 0.5 % (ref 0–1.5)
CREAT SERPL-MCNC: 0.7 MG/DL (ref 0.7–1.2)
CRITICAL: ABNORMAL
DATE ANALYZED: ABNORMAL
DATE LAST DOSE: ABNORMAL
DATE OF COLLECTION: ABNORMAL
EOSINOPHIL # BLD: 0.18 K/UL (ref 0.05–0.5)
EOSINOPHILS RELATIVE PERCENT: 2 % (ref 0–6)
ERYTHROCYTE [DISTWIDTH] IN BLOOD BY AUTOMATED COUNT: 15 % (ref 11.5–15)
FIO2: 35 %
GFR, ESTIMATED: >90 ML/MIN/1.73M2
GLUCOSE SERPL-MCNC: 117 MG/DL (ref 74–99)
HCO3: 24 MMOL/L (ref 22–26)
HCT VFR BLD AUTO: 25.4 % (ref 37–54)
HGB BLD-MCNC: 7.4 G/DL (ref 12.5–16.5)
HHB: 3.1 % (ref 0–5)
IMM GRANULOCYTES # BLD AUTO: 0.18 K/UL (ref 0–0.58)
IMM GRANULOCYTES NFR BLD: 2 % (ref 0–5)
INR PPP: 1.3
LAB: ABNORMAL
LYMPHOCYTES NFR BLD: 1.04 K/UL (ref 1.5–4)
LYMPHOCYTES RELATIVE PERCENT: 12 % (ref 20–42)
Lab: 439
MAGNESIUM SERPL-MCNC: 2 MG/DL (ref 1.6–2.6)
MCH RBC QN AUTO: 31.8 PG (ref 26–35)
MCHC RBC AUTO-ENTMCNC: 29.1 G/DL (ref 32–34.5)
MCV RBC AUTO: 109 FL (ref 80–99.9)
METHB: 0.3 % (ref 0–1.5)
MODE: AC
MONOCYTES NFR BLD: 0.75 K/UL (ref 0.1–0.95)
MONOCYTES NFR BLD: 9 % (ref 2–12)
NEUTROPHILS NFR BLD: 75 % (ref 43–80)
NEUTS SEG NFR BLD: 6.55 K/UL (ref 1.8–7.3)
O2 SATURATION: 97.7 % (ref 92–98.5)
O2HB: 96.1 % (ref 94–97)
OPERATOR ID: 1893
PARTIAL THROMBOPLASTIN TIME: 33 SEC (ref 24.5–35.1)
PATIENT TEMP: 37 C
PCO2: 30.4 MMHG (ref 35–45)
PEEP/CPAP: 8 CMH2O
PFO2: 2.6 MMHG/%
PH BLOOD GAS: 7.51 (ref 7.35–7.45)
PHOSPHATE SERPL-MCNC: 3.4 MG/DL (ref 2.5–4.5)
PLATELET # BLD AUTO: 203 K/UL (ref 130–450)
PMV BLD AUTO: 11.2 FL (ref 7–12)
PO2: 91.1 MMHG (ref 75–100)
POTASSIUM SERPL-SCNC: 3.8 MMOL/L (ref 3.5–5)
PROT SERPL-MCNC: 5.3 G/DL (ref 6.4–8.3)
PROTHROMBIN TIME: 14.6 SEC (ref 9.3–12.4)
RBC # BLD AUTO: 2.33 M/UL (ref 3.8–5.8)
RI(T): 1.35
RR MECHANICAL: 12 B/MIN
SODIUM SERPL-SCNC: 146 MMOL/L (ref 132–146)
SOURCE, BLOOD GAS: ABNORMAL
THB: 9.3 G/DL (ref 11.5–16.5)
TIME ANALYZED: 441
TME LAST DOSE: ABNORMAL H
VANCOMYCIN DOSE: ABNORMAL MG
VANCOMYCIN TROUGH SERPL-MCNC: 22.8 UG/ML (ref 5–16)
VT MECHANICAL: 430 ML
WBC OTHER # BLD: 8.7 K/UL (ref 4.5–11.5)

## 2024-06-14 PROCEDURE — 74018 RADEX ABDOMEN 1 VIEW: CPT

## 2024-06-14 PROCEDURE — 2580000003 HC RX 258: Performed by: STUDENT IN AN ORGANIZED HEALTH CARE EDUCATION/TRAINING PROGRAM

## 2024-06-14 PROCEDURE — 6360000002 HC RX W HCPCS: Performed by: INTERNAL MEDICINE

## 2024-06-14 PROCEDURE — 94003 VENT MGMT INPAT SUBQ DAY: CPT

## 2024-06-14 PROCEDURE — 2000000000 HC ICU R&B

## 2024-06-14 PROCEDURE — 83735 ASSAY OF MAGNESIUM: CPT

## 2024-06-14 PROCEDURE — 85610 PROTHROMBIN TIME: CPT

## 2024-06-14 PROCEDURE — 2580000003 HC RX 258

## 2024-06-14 PROCEDURE — C9113 INJ PANTOPRAZOLE SODIUM, VIA: HCPCS

## 2024-06-14 PROCEDURE — 6370000000 HC RX 637 (ALT 250 FOR IP): Performed by: NURSE PRACTITIONER

## 2024-06-14 PROCEDURE — 99231 SBSQ HOSP IP/OBS SF/LOW 25: CPT

## 2024-06-14 PROCEDURE — 80202 ASSAY OF VANCOMYCIN: CPT

## 2024-06-14 PROCEDURE — 6370000000 HC RX 637 (ALT 250 FOR IP): Performed by: INTERNAL MEDICINE

## 2024-06-14 PROCEDURE — 85025 COMPLETE CBC W/AUTO DIFF WBC: CPT

## 2024-06-14 PROCEDURE — 2580000003 HC RX 258: Performed by: PHYSICIAN ASSISTANT

## 2024-06-14 PROCEDURE — 85730 THROMBOPLASTIN TIME PARTIAL: CPT

## 2024-06-14 PROCEDURE — 2580000003 HC RX 258: Performed by: INTERNAL MEDICINE

## 2024-06-14 PROCEDURE — 99233 SBSQ HOSP IP/OBS HIGH 50: CPT | Performed by: PHYSICIAN ASSISTANT

## 2024-06-14 PROCEDURE — 6360000002 HC RX W HCPCS: Performed by: NURSE PRACTITIONER

## 2024-06-14 PROCEDURE — 84100 ASSAY OF PHOSPHORUS: CPT

## 2024-06-14 PROCEDURE — 99232 SBSQ HOSP IP/OBS MODERATE 35: CPT | Performed by: FAMILY MEDICINE

## 2024-06-14 PROCEDURE — 2500000003 HC RX 250 WO HCPCS: Performed by: INTERNAL MEDICINE

## 2024-06-14 PROCEDURE — 6370000000 HC RX 637 (ALT 250 FOR IP)

## 2024-06-14 PROCEDURE — 6360000002 HC RX W HCPCS

## 2024-06-14 PROCEDURE — 71045 X-RAY EXAM CHEST 1 VIEW: CPT

## 2024-06-14 PROCEDURE — 82805 BLOOD GASES W/O2 SATURATION: CPT

## 2024-06-14 PROCEDURE — 80053 COMPREHEN METABOLIC PANEL: CPT

## 2024-06-14 PROCEDURE — 99291 CRITICAL CARE FIRST HOUR: CPT | Performed by: INTERNAL MEDICINE

## 2024-06-14 RX ORDER — SODIUM CHLORIDE 9 MG/ML
INJECTION, SOLUTION INTRAVENOUS PRN
Status: DISCONTINUED | OUTPATIENT
Start: 2024-06-14 | End: 2024-06-20 | Stop reason: HOSPADM

## 2024-06-14 RX ORDER — AMLODIPINE BESYLATE 10 MG/1
10 TABLET ORAL DAILY
Status: DISCONTINUED | OUTPATIENT
Start: 2024-06-15 | End: 2024-06-20 | Stop reason: HOSPADM

## 2024-06-14 RX ORDER — SODIUM CHLORIDE 0.9 % (FLUSH) 0.9 %
5-40 SYRINGE (ML) INJECTION EVERY 12 HOURS SCHEDULED
Status: DISCONTINUED | OUTPATIENT
Start: 2024-06-14 | End: 2024-06-20 | Stop reason: HOSPADM

## 2024-06-14 RX ORDER — SODIUM CHLORIDE 0.9 % (FLUSH) 0.9 %
5-40 SYRINGE (ML) INJECTION PRN
Status: DISCONTINUED | OUTPATIENT
Start: 2024-06-14 | End: 2024-06-20 | Stop reason: HOSPADM

## 2024-06-14 RX ADMIN — OXYBUTYNIN CHLORIDE 5 MG: 5 TABLET ORAL at 20:45

## 2024-06-14 RX ADMIN — POLYVINYL ALCOHOL, POVIDONE 2 DROP: 14; 6 SOLUTION/ DROPS OPHTHALMIC at 05:20

## 2024-06-14 RX ADMIN — SODIUM CHLORIDE, PRESERVATIVE FREE 10 ML: 5 INJECTION INTRAVENOUS at 20:56

## 2024-06-14 RX ADMIN — QUETIAPINE FUMARATE 200 MG: 100 TABLET ORAL at 20:45

## 2024-06-14 RX ADMIN — CARBIDOPA AND LEVODOPA 1 TABLET: 25; 100 TABLET ORAL at 15:09

## 2024-06-14 RX ADMIN — GABAPENTIN 800 MG: 400 CAPSULE ORAL at 20:45

## 2024-06-14 RX ADMIN — APIXABAN 2.5 MG: 5 TABLET, FILM COATED ORAL at 10:14

## 2024-06-14 RX ADMIN — POLYVINYL ALCOHOL, POVIDONE 2 DROP: 14; 6 SOLUTION/ DROPS OPHTHALMIC at 10:14

## 2024-06-14 RX ADMIN — POLYVINYL ALCOHOL, POVIDONE 2 DROP: 14; 6 SOLUTION/ DROPS OPHTHALMIC at 20:55

## 2024-06-14 RX ADMIN — HYDRALAZINE HYDROCHLORIDE 10 MG: 20 INJECTION INTRAMUSCULAR; INTRAVENOUS at 01:09

## 2024-06-14 RX ADMIN — VANCOMYCIN HYDROCHLORIDE 1750 MG: 10 INJECTION, POWDER, LYOPHILIZED, FOR SOLUTION INTRAVENOUS at 20:51

## 2024-06-14 RX ADMIN — GABAPENTIN 800 MG: 400 CAPSULE ORAL at 15:09

## 2024-06-14 RX ADMIN — FINASTERIDE 5 MG: 5 TABLET, FILM COATED ORAL at 10:14

## 2024-06-14 RX ADMIN — DESMOPRESSIN ACETATE 10 MCG: 4 INJECTION, SOLUTION INTRAVENOUS; SUBCUTANEOUS at 20:45

## 2024-06-14 RX ADMIN — DOCUSATE SODIUM 100 MG: 100 CAPSULE, LIQUID FILLED ORAL at 10:14

## 2024-06-14 RX ADMIN — SODIUM CHLORIDE, PRESERVATIVE FREE 10 ML: 5 INJECTION INTRAVENOUS at 20:46

## 2024-06-14 RX ADMIN — POLYVINYL ALCOHOL, POVIDONE 2 DROP: 14; 6 SOLUTION/ DROPS OPHTHALMIC at 18:19

## 2024-06-14 RX ADMIN — DESMOPRESSIN ACETATE 10 MCG: 4 INJECTION, SOLUTION INTRAVENOUS; SUBCUTANEOUS at 08:22

## 2024-06-14 RX ADMIN — TAMSULOSIN HYDROCHLORIDE 0.4 MG: 0.4 CAPSULE ORAL at 20:45

## 2024-06-14 RX ADMIN — CARBIDOPA AND LEVODOPA 1 TABLET: 25; 100 TABLET ORAL at 20:45

## 2024-06-14 RX ADMIN — OXYBUTYNIN CHLORIDE 5 MG: 5 TABLET ORAL at 10:14

## 2024-06-14 RX ADMIN — VALPROATE SODIUM 750 MG: 100 INJECTION, SOLUTION INTRAVENOUS at 01:33

## 2024-06-14 RX ADMIN — HYDRALAZINE HYDROCHLORIDE 10 MG: 20 INJECTION INTRAMUSCULAR; INTRAVENOUS at 20:46

## 2024-06-14 RX ADMIN — CHLORHEXIDINE GLUCONATE, 0.12% ORAL RINSE 15 ML: 1.2 SOLUTION DENTAL at 20:45

## 2024-06-14 RX ADMIN — AZATHIOPRINE 150 MG: 50 TABLET ORAL at 10:21

## 2024-06-14 RX ADMIN — VANCOMYCIN HYDROCHLORIDE 1750 MG: 10 INJECTION, POWDER, LYOPHILIZED, FOR SOLUTION INTRAVENOUS at 08:10

## 2024-06-14 RX ADMIN — POTASSIUM BICARBONATE 20 MEQ: 782 TABLET, EFFERVESCENT ORAL at 06:49

## 2024-06-14 RX ADMIN — ATORVASTATIN CALCIUM 10 MG: 10 TABLET, FILM COATED ORAL at 20:45

## 2024-06-14 RX ADMIN — SODIUM CHLORIDE, PRESERVATIVE FREE 40 MG: 5 INJECTION INTRAVENOUS at 10:12

## 2024-06-14 RX ADMIN — QUETIAPINE FUMARATE 200 MG: 100 TABLET ORAL at 10:15

## 2024-06-14 RX ADMIN — VALPROATE SODIUM 750 MG: 100 INJECTION, SOLUTION INTRAVENOUS at 12:10

## 2024-06-14 RX ADMIN — GABAPENTIN 800 MG: 400 CAPSULE ORAL at 10:21

## 2024-06-14 RX ADMIN — CHLORHEXIDINE GLUCONATE, 0.12% ORAL RINSE 15 ML: 1.2 SOLUTION DENTAL at 08:24

## 2024-06-14 RX ADMIN — SODIUM CHLORIDE, PRESERVATIVE FREE 10 ML: 5 INJECTION INTRAVENOUS at 08:23

## 2024-06-14 RX ADMIN — PETROLATUM: 420 OINTMENT TOPICAL at 20:55

## 2024-06-14 RX ADMIN — POLYVINYL ALCOHOL, POVIDONE 2 DROP: 14; 6 SOLUTION/ DROPS OPHTHALMIC at 15:09

## 2024-06-14 RX ADMIN — CARBIDOPA AND LEVODOPA 1 TABLET: 25; 100 TABLET ORAL at 10:21

## 2024-06-14 ASSESSMENT — PULMONARY FUNCTION TESTS
PIF_VALUE: 13
PIF_VALUE: 13
PIF_VALUE: 12
PIF_VALUE: 13
PIF_VALUE: 14
PIF_VALUE: 12
PIF_VALUE: 16
PIF_VALUE: 14
PIF_VALUE: 13
PIF_VALUE: 12
PIF_VALUE: 17
PIF_VALUE: 15
PIF_VALUE: 13
PIF_VALUE: 14
PIF_VALUE: 17
PIF_VALUE: 16
PIF_VALUE: 22
PIF_VALUE: 12
PIF_VALUE: 17
PIF_VALUE: 20
PIF_VALUE: 17
PIF_VALUE: 13
PIF_VALUE: 12
PIF_VALUE: 12
PIF_VALUE: 16
PIF_VALUE: 17
PIF_VALUE: 25
PIF_VALUE: 12

## 2024-06-14 ASSESSMENT — PAIN SCALES - GENERAL
PAINLEVEL_OUTOF10: 0

## 2024-06-15 ENCOUNTER — APPOINTMENT (OUTPATIENT)
Dept: GENERAL RADIOLOGY | Age: 59
DRG: 004 | End: 2024-06-15
Payer: MEDICARE

## 2024-06-15 LAB
AADO2: 116.1 MMHG
AADO2: 89.9 MMHG
ALBUMIN SERPL-MCNC: 2.5 G/DL (ref 3.5–5.2)
ALBUMIN SERPL-MCNC: 2.6 G/DL (ref 3.5–5.2)
ALP SERPL-CCNC: 78 U/L (ref 40–129)
ALP SERPL-CCNC: 83 U/L (ref 40–129)
ALT SERPL-CCNC: 12 U/L (ref 0–40)
ALT SERPL-CCNC: 6 U/L (ref 0–40)
AMYLASE SERPL-CCNC: 24 U/L (ref 20–100)
ANION GAP SERPL CALCULATED.3IONS-SCNC: 12 MMOL/L (ref 7–16)
ANION GAP SERPL CALCULATED.3IONS-SCNC: 9 MMOL/L (ref 7–16)
AST SERPL-CCNC: 17 U/L (ref 0–39)
AST SERPL-CCNC: 21 U/L (ref 0–39)
B PARAP IS1001 DNA NPH QL NAA+NON-PROBE: NOT DETECTED
B PERT DNA SPEC QL NAA+PROBE: NOT DETECTED
B.E.: 0.6 MMOL/L (ref -3–3)
B.E.: 2.3 MMOL/L (ref -3–3)
BACTERIA URNS QL MICRO: ABNORMAL
BASOPHILS # BLD: 0.03 K/UL (ref 0–0.2)
BASOPHILS # BLD: 0.03 K/UL (ref 0–0.2)
BASOPHILS NFR BLD: 0 % (ref 0–2)
BASOPHILS NFR BLD: 0 % (ref 0–2)
BILIRUB DIRECT SERPL-MCNC: <0.2 MG/DL (ref 0–0.3)
BILIRUB INDIRECT SERPL-MCNC: ABNORMAL MG/DL (ref 0–1)
BILIRUB SERPL-MCNC: 0.3 MG/DL (ref 0–1.2)
BILIRUB SERPL-MCNC: 0.4 MG/DL (ref 0–1.2)
BILIRUB UR QL STRIP: NEGATIVE
BUN SERPL-MCNC: 21 MG/DL (ref 6–23)
BUN SERPL-MCNC: 23 MG/DL (ref 6–23)
C PNEUM DNA NPH QL NAA+NON-PROBE: NOT DETECTED
CA-I BLD-SCNC: 1.31 MMOL/L (ref 1.15–1.33)
CALCIUM SERPL-MCNC: 9.1 MG/DL (ref 8.6–10.2)
CALCIUM SERPL-MCNC: 9.5 MG/DL (ref 8.6–10.2)
CHLORIDE SERPL-SCNC: 115 MMOL/L (ref 98–107)
CHLORIDE SERPL-SCNC: 115 MMOL/L (ref 98–107)
CHLORIDE UR-SCNC: 54 MMOL/L
CLARITY UR: CLEAR
CO2 SERPL-SCNC: 21 MMOL/L (ref 22–29)
CO2 SERPL-SCNC: 25 MMOL/L (ref 22–29)
COHB: 0.2 % (ref 0–1.5)
COHB: 0.3 % (ref 0–1.5)
COLOR UR: YELLOW
CREAT SERPL-MCNC: 0.7 MG/DL (ref 0.7–1.2)
CREAT SERPL-MCNC: 0.7 MG/DL (ref 0.7–1.2)
CREAT UR-MCNC: 56.3 MG/DL (ref 40–278)
CRITICAL: ABNORMAL
CRITICAL: ABNORMAL
DATE ANALYZED: ABNORMAL
DATE ANALYZED: ABNORMAL
DATE LAST DOSE: ABNORMAL
DATE OF COLLECTION: ABNORMAL
DATE OF COLLECTION: ABNORMAL
EOSINOPHIL # BLD: 0.17 K/UL (ref 0.05–0.5)
EOSINOPHIL # BLD: 0.21 K/UL (ref 0.05–0.5)
EOSINOPHILS RELATIVE PERCENT: 2 % (ref 0–6)
EOSINOPHILS RELATIVE PERCENT: 2 % (ref 0–6)
ERYTHROCYTE [DISTWIDTH] IN BLOOD BY AUTOMATED COUNT: 15.1 % (ref 11.5–15)
ERYTHROCYTE [DISTWIDTH] IN BLOOD BY AUTOMATED COUNT: 15.2 % (ref 11.5–15)
FIO2: 35 %
FIO2: 35 %
FLUAV RNA NPH QL NAA+NON-PROBE: NOT DETECTED
FLUBV RNA NPH QL NAA+NON-PROBE: NOT DETECTED
GFR, ESTIMATED: >90 ML/MIN/1.73M2
GFR, ESTIMATED: >90 ML/MIN/1.73M2
GLUCOSE SERPL-MCNC: 127 MG/DL (ref 74–99)
GLUCOSE SERPL-MCNC: 158 MG/DL (ref 74–99)
GLUCOSE UR STRIP-MCNC: NEGATIVE MG/DL
HADV DNA NPH QL NAA+NON-PROBE: NOT DETECTED
HBA1C MFR BLD: 5.9 % (ref 4–5.6)
HCO3: 24.1 MMOL/L (ref 22–26)
HCO3: 25.4 MMOL/L (ref 22–26)
HCOV 229E RNA NPH QL NAA+NON-PROBE: NOT DETECTED
HCOV HKU1 RNA NPH QL NAA+NON-PROBE: NOT DETECTED
HCOV NL63 RNA NPH QL NAA+NON-PROBE: NOT DETECTED
HCOV OC43 RNA NPH QL NAA+NON-PROBE: NOT DETECTED
HCT VFR BLD AUTO: 25.2 % (ref 37–54)
HCT VFR BLD AUTO: 25.9 % (ref 37–54)
HGB BLD-MCNC: 7.5 G/DL (ref 12.5–16.5)
HGB BLD-MCNC: 7.6 G/DL (ref 12.5–16.5)
HGB UR QL STRIP.AUTO: NEGATIVE
HHB: 2 % (ref 0–5)
HHB: 3.1 % (ref 0–5)
HMPV RNA NPH QL NAA+NON-PROBE: NOT DETECTED
HPIV1 RNA NPH QL NAA+NON-PROBE: NOT DETECTED
HPIV2 RNA NPH QL NAA+NON-PROBE: NOT DETECTED
HPIV3 RNA NPH QL NAA+NON-PROBE: NOT DETECTED
HPIV4 RNA NPH QL NAA+NON-PROBE: NOT DETECTED
IMM GRANULOCYTES # BLD AUTO: 0.09 K/UL (ref 0–0.58)
IMM GRANULOCYTES # BLD AUTO: 0.13 K/UL (ref 0–0.58)
IMM GRANULOCYTES NFR BLD: 1 % (ref 0–5)
IMM GRANULOCYTES NFR BLD: 2 % (ref 0–5)
INR PPP: 1.2
INR PPP: 1.3
KETONES UR STRIP-MCNC: NEGATIVE MG/DL
LAB: ABNORMAL
LAB: ABNORMAL
LACTATE BLDV-SCNC: 1 MMOL/L (ref 0.5–2.2)
LEUKOCYTE ESTERASE UR QL STRIP: NEGATIVE
LIPASE SERPL-CCNC: 21 U/L (ref 13–60)
LYMPHOCYTES NFR BLD: 0.79 K/UL (ref 1.5–4)
LYMPHOCYTES NFR BLD: 1.19 K/UL (ref 1.5–4)
LYMPHOCYTES RELATIVE PERCENT: 14 % (ref 20–42)
LYMPHOCYTES RELATIVE PERCENT: 9 % (ref 20–42)
Lab: 1716
Lab: 448
M PNEUMO DNA NPH QL NAA+NON-PROBE: NOT DETECTED
MAGNESIUM SERPL-MCNC: 2 MG/DL (ref 1.6–2.6)
MAGNESIUM SERPL-MCNC: 2 MG/DL (ref 1.6–2.6)
MCH RBC QN AUTO: 31.6 PG (ref 26–35)
MCH RBC QN AUTO: 32.9 PG (ref 26–35)
MCHC RBC AUTO-ENTMCNC: 29 G/DL (ref 32–34.5)
MCHC RBC AUTO-ENTMCNC: 30.2 G/DL (ref 32–34.5)
MCV RBC AUTO: 109.1 FL (ref 80–99.9)
MCV RBC AUTO: 109.3 FL (ref 80–99.9)
METHB: 0.5 % (ref 0–1.5)
METHB: 0.5 % (ref 0–1.5)
MODE: AC
MODE: AC
MONOCYTES NFR BLD: 0.76 K/UL (ref 0.1–0.95)
MONOCYTES NFR BLD: 0.82 K/UL (ref 0.1–0.95)
MONOCYTES NFR BLD: 10 % (ref 2–12)
MONOCYTES NFR BLD: 8 % (ref 2–12)
NEUTROPHILS NFR BLD: 73 % (ref 43–80)
NEUTROPHILS NFR BLD: 80 % (ref 43–80)
NEUTS SEG NFR BLD: 6.28 K/UL (ref 1.8–7.3)
NEUTS SEG NFR BLD: 7.47 K/UL (ref 1.8–7.3)
NITRITE UR QL STRIP: NEGATIVE
O2 SATURATION: 97.8 % (ref 92–98.5)
O2 SATURATION: 98.6 % (ref 92–98.5)
O2HB: 96.1 % (ref 94–97)
O2HB: 97.3 % (ref 94–97)
OPERATOR ID: 359
OPERATOR ID: ABNORMAL
OSMOLALITY UR: 486 MOSM/KG (ref 300–900)
PARTIAL THROMBOPLASTIN TIME: 33.5 SEC (ref 24.5–35.1)
PATIENT TEMP: 37 C
PATIENT TEMP: 37 C
PCO2: 33.6 MMHG (ref 35–45)
PCO2: 34.2 MMHG (ref 35–45)
PEEP/CPAP: 8 CMH2O
PEEP/CPAP: 8 CMH2O
PFO2: 2.7 MMHG/%
PFO2: 3.43 MMHG/%
PH BLOOD GAS: 7.47 (ref 7.35–7.45)
PH BLOOD GAS: 7.5 (ref 7.35–7.45)
PH UR STRIP: 6 [PH] (ref 5–9)
PHOSPHATE SERPL-MCNC: 3.5 MG/DL (ref 2.5–4.5)
PHOSPHATE SERPL-MCNC: 3.7 MG/DL (ref 2.5–4.5)
PLATELET # BLD AUTO: 248 K/UL (ref 130–450)
PLATELET # BLD AUTO: 252 K/UL (ref 130–450)
PMV BLD AUTO: 11.1 FL (ref 7–12)
PMV BLD AUTO: 11.2 FL (ref 7–12)
PO2: 119.9 MMHG (ref 75–100)
PO2: 94.4 MMHG (ref 75–100)
POTASSIUM SERPL-SCNC: 3.7 MMOL/L (ref 3.5–5)
POTASSIUM SERPL-SCNC: 3.9 MMOL/L (ref 3.5–5)
POTASSIUM, UR: 57.3 MMOL/L
PROT SERPL-MCNC: 5.4 G/DL (ref 6.4–8.3)
PROT SERPL-MCNC: 5.5 G/DL (ref 6.4–8.3)
PROT UR STRIP-MCNC: NEGATIVE MG/DL
PROTHROMBIN TIME: 12.9 SEC (ref 9.3–12.4)
PROTHROMBIN TIME: 14.2 SEC (ref 9.3–12.4)
RBC # BLD AUTO: 2.31 M/UL (ref 3.8–5.8)
RBC # BLD AUTO: 2.37 M/UL (ref 3.8–5.8)
RBC #/AREA URNS HPF: ABNORMAL /HPF
RI(T): 0.75
RI(T): 1.23
RR MECHANICAL: 12 B/MIN
RR MECHANICAL: 12 B/MIN
RSV RNA NPH QL NAA+NON-PROBE: NOT DETECTED
RV+EV RNA NPH QL NAA+NON-PROBE: NOT DETECTED
SARS-COV-2 RNA NPH QL NAA+NON-PROBE: NOT DETECTED
SODIUM SERPL-SCNC: 148 MMOL/L (ref 132–146)
SODIUM SERPL-SCNC: 149 MMOL/L (ref 132–146)
SOURCE, BLOOD GAS: ABNORMAL
SOURCE, BLOOD GAS: ABNORMAL
SP GR UR STRIP: 1.02 (ref 1–1.03)
SPECIMEN DESCRIPTION: NORMAL
THB: 8.8 G/DL (ref 11.5–16.5)
THB: 9 G/DL (ref 11.5–16.5)
TIME ANALYZED: 1724
TIME ANALYZED: 506
TME LAST DOSE: ABNORMAL H
UROBILINOGEN UR STRIP-ACNC: 1 EU/DL (ref 0–1)
UUN UR-MCNC: 715 MG/DL (ref 800–1666)
VANCOMYCIN DOSE: ABNORMAL MG
VANCOMYCIN TROUGH SERPL-MCNC: 23.6 UG/ML (ref 5–16)
VT MECHANICAL: 430 ML
VT MECHANICAL: 430 ML
WBC #/AREA URNS HPF: ABNORMAL /HPF
WBC OTHER # BLD: 8.7 K/UL (ref 4.5–11.5)
WBC OTHER # BLD: 9.3 K/UL (ref 4.5–11.5)

## 2024-06-15 PROCEDURE — 2500000003 HC RX 250 WO HCPCS: Performed by: INTERNAL MEDICINE

## 2024-06-15 PROCEDURE — 0202U NFCT DS 22 TRGT SARS-COV-2: CPT

## 2024-06-15 PROCEDURE — 71045 X-RAY EXAM CHEST 1 VIEW: CPT

## 2024-06-15 PROCEDURE — 51701 INSERT BLADDER CATHETER: CPT

## 2024-06-15 PROCEDURE — 6370000000 HC RX 637 (ALT 250 FOR IP)

## 2024-06-15 PROCEDURE — 83935 ASSAY OF URINE OSMOLALITY: CPT

## 2024-06-15 PROCEDURE — 84133 ASSAY OF URINE POTASSIUM: CPT

## 2024-06-15 PROCEDURE — 83690 ASSAY OF LIPASE: CPT

## 2024-06-15 PROCEDURE — 99232 SBSQ HOSP IP/OBS MODERATE 35: CPT

## 2024-06-15 PROCEDURE — 99291 CRITICAL CARE FIRST HOUR: CPT | Performed by: INTERNAL MEDICINE

## 2024-06-15 PROCEDURE — 82330 ASSAY OF CALCIUM: CPT

## 2024-06-15 PROCEDURE — 84540 ASSAY OF URINE/UREA-N: CPT

## 2024-06-15 PROCEDURE — 82805 BLOOD GASES W/O2 SATURATION: CPT

## 2024-06-15 PROCEDURE — 6370000000 HC RX 637 (ALT 250 FOR IP): Performed by: NURSE PRACTITIONER

## 2024-06-15 PROCEDURE — 94003 VENT MGMT INPAT SUBQ DAY: CPT

## 2024-06-15 PROCEDURE — 83036 HEMOGLOBIN GLYCOSYLATED A1C: CPT

## 2024-06-15 PROCEDURE — 83605 ASSAY OF LACTIC ACID: CPT

## 2024-06-15 PROCEDURE — 85730 THROMBOPLASTIN TIME PARTIAL: CPT

## 2024-06-15 PROCEDURE — 82570 ASSAY OF URINE CREATININE: CPT

## 2024-06-15 PROCEDURE — 6360000002 HC RX W HCPCS: Performed by: NURSE PRACTITIONER

## 2024-06-15 PROCEDURE — 2580000003 HC RX 258: Performed by: INTERNAL MEDICINE

## 2024-06-15 PROCEDURE — 82150 ASSAY OF AMYLASE: CPT

## 2024-06-15 PROCEDURE — 2580000003 HC RX 258

## 2024-06-15 PROCEDURE — 6370000000 HC RX 637 (ALT 250 FOR IP): Performed by: INTERNAL MEDICINE

## 2024-06-15 PROCEDURE — 2000000000 HC ICU R&B

## 2024-06-15 PROCEDURE — 6360000002 HC RX W HCPCS

## 2024-06-15 PROCEDURE — 83735 ASSAY OF MAGNESIUM: CPT

## 2024-06-15 PROCEDURE — 85025 COMPLETE CBC W/AUTO DIFF WBC: CPT

## 2024-06-15 PROCEDURE — 6360000002 HC RX W HCPCS: Performed by: INTERNAL MEDICINE

## 2024-06-15 PROCEDURE — 85610 PROTHROMBIN TIME: CPT

## 2024-06-15 PROCEDURE — 80202 ASSAY OF VANCOMYCIN: CPT

## 2024-06-15 PROCEDURE — 80053 COMPREHEN METABOLIC PANEL: CPT

## 2024-06-15 PROCEDURE — 84100 ASSAY OF PHOSPHORUS: CPT

## 2024-06-15 PROCEDURE — 86850 RBC ANTIBODY SCREEN: CPT

## 2024-06-15 PROCEDURE — 2580000003 HC RX 258: Performed by: PHYSICIAN ASSISTANT

## 2024-06-15 PROCEDURE — 82248 BILIRUBIN DIRECT: CPT

## 2024-06-15 PROCEDURE — 81001 URINALYSIS AUTO W/SCOPE: CPT

## 2024-06-15 PROCEDURE — 2580000003 HC RX 258: Performed by: STUDENT IN AN ORGANIZED HEALTH CARE EDUCATION/TRAINING PROGRAM

## 2024-06-15 PROCEDURE — 86901 BLOOD TYPING SEROLOGIC RH(D): CPT

## 2024-06-15 PROCEDURE — 86900 BLOOD TYPING SEROLOGIC ABO: CPT

## 2024-06-15 PROCEDURE — C9113 INJ PANTOPRAZOLE SODIUM, VIA: HCPCS

## 2024-06-15 PROCEDURE — 51798 US URINE CAPACITY MEASURE: CPT

## 2024-06-15 PROCEDURE — 82436 ASSAY OF URINE CHLORIDE: CPT

## 2024-06-15 PROCEDURE — 99231 SBSQ HOSP IP/OBS SF/LOW 25: CPT | Performed by: FAMILY MEDICINE

## 2024-06-15 PROCEDURE — 86923 COMPATIBILITY TEST ELECTRIC: CPT

## 2024-06-15 RX ORDER — GLYCOPYRROLATE 0.2 MG/ML
0.2 INJECTION INTRAMUSCULAR; INTRAVENOUS EVERY 4 HOURS PRN
Status: DISCONTINUED | OUTPATIENT
Start: 2024-06-15 | End: 2024-06-20 | Stop reason: HOSPADM

## 2024-06-15 RX ORDER — MORPHINE SULFATE 2 MG/ML
2 INJECTION, SOLUTION INTRAMUSCULAR; INTRAVENOUS
Status: DISCONTINUED | OUTPATIENT
Start: 2024-06-15 | End: 2024-06-20

## 2024-06-15 RX ORDER — LORAZEPAM 2 MG/ML
1 INJECTION INTRAMUSCULAR EVERY 4 HOURS PRN
Status: DISCONTINUED | OUTPATIENT
Start: 2024-06-15 | End: 2024-06-20

## 2024-06-15 RX ADMIN — SODIUM CHLORIDE, PRESERVATIVE FREE 40 MG: 5 INJECTION INTRAVENOUS at 09:20

## 2024-06-15 RX ADMIN — POTASSIUM BICARBONATE 20 MEQ: 782 TABLET, EFFERVESCENT ORAL at 07:43

## 2024-06-15 RX ADMIN — POLYVINYL ALCOHOL, POVIDONE 2 DROP: 14; 6 SOLUTION/ DROPS OPHTHALMIC at 09:22

## 2024-06-15 RX ADMIN — ATORVASTATIN CALCIUM 10 MG: 10 TABLET, FILM COATED ORAL at 22:11

## 2024-06-15 RX ADMIN — CARBIDOPA AND LEVODOPA 1 TABLET: 25; 100 TABLET ORAL at 09:24

## 2024-06-15 RX ADMIN — AMLODIPINE BESYLATE 10 MG: 10 TABLET ORAL at 09:16

## 2024-06-15 RX ADMIN — CARBIDOPA AND LEVODOPA 1 TABLET: 25; 100 TABLET ORAL at 22:11

## 2024-06-15 RX ADMIN — CHLORHEXIDINE GLUCONATE, 0.12% ORAL RINSE 15 ML: 1.2 SOLUTION DENTAL at 09:17

## 2024-06-15 RX ADMIN — POLYVINYL ALCOHOL, POVIDONE 2 DROP: 14; 6 SOLUTION/ DROPS OPHTHALMIC at 13:33

## 2024-06-15 RX ADMIN — GABAPENTIN 800 MG: 400 CAPSULE ORAL at 09:25

## 2024-06-15 RX ADMIN — OXYBUTYNIN CHLORIDE 5 MG: 5 TABLET ORAL at 22:11

## 2024-06-15 RX ADMIN — PETROLATUM: 420 OINTMENT TOPICAL at 09:22

## 2024-06-15 RX ADMIN — QUETIAPINE FUMARATE 200 MG: 100 TABLET ORAL at 22:11

## 2024-06-15 RX ADMIN — GABAPENTIN 800 MG: 400 CAPSULE ORAL at 22:11

## 2024-06-15 RX ADMIN — SODIUM CHLORIDE, PRESERVATIVE FREE 10 ML: 5 INJECTION INTRAVENOUS at 22:11

## 2024-06-15 RX ADMIN — DESMOPRESSIN ACETATE 10 MCG: 4 INJECTION, SOLUTION INTRAVENOUS; SUBCUTANEOUS at 22:13

## 2024-06-15 RX ADMIN — AZATHIOPRINE 150 MG: 50 TABLET ORAL at 09:16

## 2024-06-15 RX ADMIN — TAMSULOSIN HYDROCHLORIDE 0.4 MG: 0.4 CAPSULE ORAL at 22:11

## 2024-06-15 RX ADMIN — POLYVINYL ALCOHOL, POVIDONE 2 DROP: 14; 6 SOLUTION/ DROPS OPHTHALMIC at 02:30

## 2024-06-15 RX ADMIN — PETROLATUM: 420 OINTMENT TOPICAL at 22:13

## 2024-06-15 RX ADMIN — VALPROATE SODIUM 750 MG: 100 INJECTION, SOLUTION INTRAVENOUS at 01:00

## 2024-06-15 RX ADMIN — FINASTERIDE 5 MG: 5 TABLET, FILM COATED ORAL at 09:20

## 2024-06-15 RX ADMIN — DOCUSATE SODIUM 100 MG: 100 CAPSULE, LIQUID FILLED ORAL at 09:19

## 2024-06-15 RX ADMIN — Medication 1500 MG: at 22:17

## 2024-06-15 RX ADMIN — LORAZEPAM 1 MG: 2 INJECTION INTRAMUSCULAR; INTRAVENOUS at 20:22

## 2024-06-15 RX ADMIN — QUETIAPINE FUMARATE 200 MG: 100 TABLET ORAL at 09:21

## 2024-06-15 RX ADMIN — SODIUM CHLORIDE, PRESERVATIVE FREE 10 ML: 5 INJECTION INTRAVENOUS at 09:21

## 2024-06-15 RX ADMIN — SODIUM CHLORIDE, PRESERVATIVE FREE 10 ML: 5 INJECTION INTRAVENOUS at 22:13

## 2024-06-15 RX ADMIN — OXYBUTYNIN CHLORIDE 5 MG: 5 TABLET ORAL at 09:20

## 2024-06-15 RX ADMIN — IPRATROPIUM BROMIDE AND ALBUTEROL SULFATE 1 DOSE: 2.5; .5 SOLUTION RESPIRATORY (INHALATION) at 08:38

## 2024-06-15 RX ADMIN — SODIUM CHLORIDE, PRESERVATIVE FREE 10 ML: 5 INJECTION INTRAVENOUS at 22:12

## 2024-06-15 RX ADMIN — POLYVINYL ALCOHOL, POVIDONE 2 DROP: 14; 6 SOLUTION/ DROPS OPHTHALMIC at 16:19

## 2024-06-15 RX ADMIN — POLYVINYL ALCOHOL, POVIDONE 2 DROP: 14; 6 SOLUTION/ DROPS OPHTHALMIC at 22:11

## 2024-06-15 RX ADMIN — POLYVINYL ALCOHOL, POVIDONE 2 DROP: 14; 6 SOLUTION/ DROPS OPHTHALMIC at 05:12

## 2024-06-15 RX ADMIN — DESMOPRESSIN ACETATE 10 MCG: 4 INJECTION, SOLUTION INTRAVENOUS; SUBCUTANEOUS at 09:19

## 2024-06-15 ASSESSMENT — PULMONARY FUNCTION TESTS
PIF_VALUE: 12
PIF_VALUE: 17
PIF_VALUE: 19
PIF_VALUE: 15
PIF_VALUE: 19
PIF_VALUE: 12
PIF_VALUE: 32
PIF_VALUE: 13
PIF_VALUE: 19
PIF_VALUE: 14
PIF_VALUE: 12
PIF_VALUE: 15
PIF_VALUE: 23
PIF_VALUE: 13
PIF_VALUE: 14
PIF_VALUE: 21
PIF_VALUE: 14
PIF_VALUE: 13
PIF_VALUE: 12
PIF_VALUE: 23
PIF_VALUE: 12
PIF_VALUE: 16
PIF_VALUE: 12
PIF_VALUE: 14
PIF_VALUE: 14
PIF_VALUE: 19
PIF_VALUE: 26

## 2024-06-15 ASSESSMENT — PAIN SCALES - GENERAL
PAINLEVEL_OUTOF10: 0
PAINLEVEL_OUTOF10: 0
PAINLEVEL_OUTOF10: 3
PAINLEVEL_OUTOF10: 0
PAINLEVEL_OUTOF10: 0
PAINLEVEL_OUTOF10: 3
PAINLEVEL_OUTOF10: 0

## 2024-06-16 LAB
AADO2: 101.8 MMHG
ALBUMIN SERPL-MCNC: 2.8 G/DL (ref 3.5–5.2)
ALP SERPL-CCNC: 87 U/L (ref 40–129)
ALT SERPL-CCNC: <5 U/L (ref 0–40)
ANION GAP SERPL CALCULATED.3IONS-SCNC: 13 MMOL/L (ref 7–16)
AST SERPL-CCNC: 18 U/L (ref 0–39)
B.E.: -1.5 MMOL/L (ref -3–3)
BASOPHILS # BLD: 0.03 K/UL (ref 0–0.2)
BASOPHILS NFR BLD: 0 % (ref 0–2)
BILIRUB SERPL-MCNC: 0.4 MG/DL (ref 0–1.2)
BUN SERPL-MCNC: 20 MG/DL (ref 6–23)
CALCIUM SERPL-MCNC: 9.7 MG/DL (ref 8.6–10.2)
CHLORIDE SERPL-SCNC: 115 MMOL/L (ref 98–107)
CO2 SERPL-SCNC: 21 MMOL/L (ref 22–29)
COHB: 2.5 % (ref 0–1.5)
CREAT SERPL-MCNC: 0.7 MG/DL (ref 0.7–1.2)
CRITICAL: ABNORMAL
DATE ANALYZED: ABNORMAL
DATE OF COLLECTION: ABNORMAL
EOSINOPHIL # BLD: 0.21 K/UL (ref 0.05–0.5)
EOSINOPHILS RELATIVE PERCENT: 2 % (ref 0–6)
ERYTHROCYTE [DISTWIDTH] IN BLOOD BY AUTOMATED COUNT: 14.9 % (ref 11.5–15)
ERYTHROCYTE [SEDIMENTATION RATE] IN BLOOD BY WESTERGREN METHOD: 133 MM/HR (ref 0–15)
FIO2: 35 %
GFR, ESTIMATED: >90 ML/MIN/1.73M2
GLUCOSE SERPL-MCNC: 118 MG/DL (ref 74–99)
HCO3: 21.8 MMOL/L (ref 22–26)
HCT VFR BLD AUTO: 26.7 % (ref 37–54)
HGB BLD-MCNC: 7.9 G/DL (ref 12.5–16.5)
HHB: 1.5 % (ref 0–5)
IMM GRANULOCYTES # BLD AUTO: 0.12 K/UL (ref 0–0.58)
IMM GRANULOCYTES NFR BLD: 1 % (ref 0–5)
INR PPP: 1.2
LAB: ABNORMAL
LYMPHOCYTES NFR BLD: 1.11 K/UL (ref 1.5–4)
LYMPHOCYTES RELATIVE PERCENT: 10 % (ref 20–42)
Lab: 430
MAGNESIUM SERPL-MCNC: 2 MG/DL (ref 1.6–2.6)
MCH RBC QN AUTO: 32.4 PG (ref 26–35)
MCHC RBC AUTO-ENTMCNC: 29.6 G/DL (ref 32–34.5)
MCV RBC AUTO: 109.4 FL (ref 80–99.9)
METHB: 0.6 % (ref 0–1.5)
MODE: AC
MONOCYTES NFR BLD: 0.85 K/UL (ref 0.1–0.95)
MONOCYTES NFR BLD: 8 % (ref 2–12)
NEUTROPHILS NFR BLD: 79 % (ref 43–80)
NEUTS SEG NFR BLD: 8.45 K/UL (ref 1.8–7.3)
O2 SATURATION: 98.4 % (ref 92–98.5)
O2HB: 95.4 % (ref 94–97)
OPERATOR ID: ABNORMAL
PATIENT TEMP: 37 C
PCO2: 30.1 MMHG (ref 35–45)
PEEP/CPAP: 8 CMH2O
PFO2: 3.22 MMHG/%
PH BLOOD GAS: 7.48 (ref 7.35–7.45)
PHOSPHATE SERPL-MCNC: 3.5 MG/DL (ref 2.5–4.5)
PLATELET # BLD AUTO: 278 K/UL (ref 130–450)
PMV BLD AUTO: 11 FL (ref 7–12)
PO2: 112.8 MMHG (ref 75–100)
POTASSIUM SERPL-SCNC: 3.7 MMOL/L (ref 3.5–5)
PROT SERPL-MCNC: 5.6 G/DL (ref 6.4–8.3)
PROTHROMBIN TIME: 13.6 SEC (ref 9.3–12.4)
RBC # BLD AUTO: 2.44 M/UL (ref 3.8–5.8)
RI(T): 0.9
RR MECHANICAL: 12 B/MIN
SODIUM SERPL-SCNC: 149 MMOL/L (ref 132–146)
SOURCE, BLOOD GAS: ABNORMAL
THB: 6.8 G/DL (ref 11.5–16.5)
TIME ANALYZED: 452
VT MECHANICAL: 430 ML
WBC OTHER # BLD: 10.8 K/UL (ref 4.5–11.5)

## 2024-06-16 PROCEDURE — 6360000002 HC RX W HCPCS: Performed by: INTERNAL MEDICINE

## 2024-06-16 PROCEDURE — 6370000000 HC RX 637 (ALT 250 FOR IP): Performed by: NURSE PRACTITIONER

## 2024-06-16 PROCEDURE — 2580000003 HC RX 258: Performed by: STUDENT IN AN ORGANIZED HEALTH CARE EDUCATION/TRAINING PROGRAM

## 2024-06-16 PROCEDURE — 6370000000 HC RX 637 (ALT 250 FOR IP): Performed by: INTERNAL MEDICINE

## 2024-06-16 PROCEDURE — 2580000003 HC RX 258: Performed by: INTERNAL MEDICINE

## 2024-06-16 PROCEDURE — 51798 US URINE CAPACITY MEASURE: CPT

## 2024-06-16 PROCEDURE — 6360000002 HC RX W HCPCS: Performed by: NURSE PRACTITIONER

## 2024-06-16 PROCEDURE — 2580000003 HC RX 258

## 2024-06-16 PROCEDURE — C9113 INJ PANTOPRAZOLE SODIUM, VIA: HCPCS

## 2024-06-16 PROCEDURE — 6370000000 HC RX 637 (ALT 250 FOR IP)

## 2024-06-16 PROCEDURE — 82805 BLOOD GASES W/O2 SATURATION: CPT

## 2024-06-16 PROCEDURE — 85610 PROTHROMBIN TIME: CPT

## 2024-06-16 PROCEDURE — 83735 ASSAY OF MAGNESIUM: CPT

## 2024-06-16 PROCEDURE — 99231 SBSQ HOSP IP/OBS SF/LOW 25: CPT | Performed by: FAMILY MEDICINE

## 2024-06-16 PROCEDURE — 2000000000 HC ICU R&B

## 2024-06-16 PROCEDURE — 6360000002 HC RX W HCPCS

## 2024-06-16 PROCEDURE — 99291 CRITICAL CARE FIRST HOUR: CPT | Performed by: INTERNAL MEDICINE

## 2024-06-16 PROCEDURE — 2500000003 HC RX 250 WO HCPCS: Performed by: INTERNAL MEDICINE

## 2024-06-16 PROCEDURE — 85652 RBC SED RATE AUTOMATED: CPT

## 2024-06-16 PROCEDURE — 84100 ASSAY OF PHOSPHORUS: CPT

## 2024-06-16 PROCEDURE — 80053 COMPREHEN METABOLIC PANEL: CPT

## 2024-06-16 PROCEDURE — 85025 COMPLETE CBC W/AUTO DIFF WBC: CPT

## 2024-06-16 PROCEDURE — 94003 VENT MGMT INPAT SUBQ DAY: CPT

## 2024-06-16 PROCEDURE — 2580000003 HC RX 258: Performed by: PHYSICIAN ASSISTANT

## 2024-06-16 RX ADMIN — VALPROATE SODIUM 750 MG: 100 INJECTION, SOLUTION INTRAVENOUS at 00:52

## 2024-06-16 RX ADMIN — CHLORHEXIDINE GLUCONATE, 0.12% ORAL RINSE 15 ML: 1.2 SOLUTION DENTAL at 00:50

## 2024-06-16 RX ADMIN — AMLODIPINE BESYLATE 10 MG: 10 TABLET ORAL at 08:45

## 2024-06-16 RX ADMIN — GABAPENTIN 800 MG: 400 CAPSULE ORAL at 22:35

## 2024-06-16 RX ADMIN — SODIUM CHLORIDE, PRESERVATIVE FREE 10 ML: 5 INJECTION INTRAVENOUS at 08:48

## 2024-06-16 RX ADMIN — CARBIDOPA AND LEVODOPA 1 TABLET: 25; 100 TABLET ORAL at 14:19

## 2024-06-16 RX ADMIN — HYDRALAZINE HYDROCHLORIDE 10 MG: 20 INJECTION INTRAMUSCULAR; INTRAVENOUS at 09:01

## 2024-06-16 RX ADMIN — QUETIAPINE FUMARATE 200 MG: 100 TABLET ORAL at 20:21

## 2024-06-16 RX ADMIN — POLYVINYL ALCOHOL, POVIDONE 2 DROP: 14; 6 SOLUTION/ DROPS OPHTHALMIC at 20:36

## 2024-06-16 RX ADMIN — QUETIAPINE FUMARATE 200 MG: 100 TABLET ORAL at 08:44

## 2024-06-16 RX ADMIN — POLYVINYL ALCOHOL, POVIDONE 2 DROP: 14; 6 SOLUTION/ DROPS OPHTHALMIC at 17:53

## 2024-06-16 RX ADMIN — OXYBUTYNIN CHLORIDE 5 MG: 5 TABLET ORAL at 22:35

## 2024-06-16 RX ADMIN — CARBIDOPA AND LEVODOPA 1 TABLET: 25; 100 TABLET ORAL at 20:28

## 2024-06-16 RX ADMIN — Medication 1500 MG: at 09:01

## 2024-06-16 RX ADMIN — CHLORHEXIDINE GLUCONATE, 0.12% ORAL RINSE 15 ML: 1.2 SOLUTION DENTAL at 08:45

## 2024-06-16 RX ADMIN — CHLOROTHIAZIDE SODIUM 250 MG: 500 INJECTION, POWDER, LYOPHILIZED, FOR SOLUTION INTRAVENOUS at 23:52

## 2024-06-16 RX ADMIN — POLYVINYL ALCOHOL, POVIDONE 2 DROP: 14; 6 SOLUTION/ DROPS OPHTHALMIC at 03:22

## 2024-06-16 RX ADMIN — VALPROATE SODIUM 750 MG: 100 INJECTION, SOLUTION INTRAVENOUS at 12:27

## 2024-06-16 RX ADMIN — SODIUM CHLORIDE, PRESERVATIVE FREE 10 ML: 5 INJECTION INTRAVENOUS at 20:11

## 2024-06-16 RX ADMIN — POLYVINYL ALCOHOL, POVIDONE 2 DROP: 14; 6 SOLUTION/ DROPS OPHTHALMIC at 14:19

## 2024-06-16 RX ADMIN — CHLORHEXIDINE GLUCONATE, 0.12% ORAL RINSE 15 ML: 1.2 SOLUTION DENTAL at 20:08

## 2024-06-16 RX ADMIN — ATORVASTATIN CALCIUM 10 MG: 10 TABLET, FILM COATED ORAL at 20:21

## 2024-06-16 RX ADMIN — LORAZEPAM 1 MG: 2 INJECTION INTRAMUSCULAR; INTRAVENOUS at 08:44

## 2024-06-16 RX ADMIN — CARBIDOPA AND LEVODOPA 1 TABLET: 25; 100 TABLET ORAL at 08:45

## 2024-06-16 RX ADMIN — SODIUM CHLORIDE, PRESERVATIVE FREE 10 ML: 5 INJECTION INTRAVENOUS at 20:12

## 2024-06-16 RX ADMIN — TAMSULOSIN HYDROCHLORIDE 0.4 MG: 0.4 CAPSULE ORAL at 20:29

## 2024-06-16 RX ADMIN — CHLOROTHIAZIDE SODIUM 250 MG: 500 INJECTION, POWDER, LYOPHILIZED, FOR SOLUTION INTRAVENOUS at 16:22

## 2024-06-16 RX ADMIN — DESMOPRESSIN ACETATE 10 MCG: 4 INJECTION, SOLUTION INTRAVENOUS; SUBCUTANEOUS at 08:46

## 2024-06-16 RX ADMIN — POLYVINYL ALCOHOL, POVIDONE 2 DROP: 14; 6 SOLUTION/ DROPS OPHTHALMIC at 06:25

## 2024-06-16 RX ADMIN — Medication 1500 MG: at 20:07

## 2024-06-16 RX ADMIN — PETROLATUM: 420 OINTMENT TOPICAL at 08:47

## 2024-06-16 RX ADMIN — GABAPENTIN 800 MG: 400 CAPSULE ORAL at 08:45

## 2024-06-16 RX ADMIN — LORAZEPAM 1 MG: 2 INJECTION INTRAMUSCULAR; INTRAVENOUS at 00:43

## 2024-06-16 RX ADMIN — POLYVINYL ALCOHOL, POVIDONE 2 DROP: 14; 6 SOLUTION/ DROPS OPHTHALMIC at 10:00

## 2024-06-16 RX ADMIN — SODIUM CHLORIDE, PRESERVATIVE FREE 10 ML: 5 INJECTION INTRAVENOUS at 08:47

## 2024-06-16 RX ADMIN — FINASTERIDE 5 MG: 5 TABLET, FILM COATED ORAL at 08:46

## 2024-06-16 RX ADMIN — OXYBUTYNIN CHLORIDE 5 MG: 5 TABLET ORAL at 08:46

## 2024-06-16 RX ADMIN — AZATHIOPRINE 150 MG: 50 TABLET ORAL at 08:45

## 2024-06-16 RX ADMIN — GABAPENTIN 800 MG: 400 CAPSULE ORAL at 14:19

## 2024-06-16 RX ADMIN — SODIUM CHLORIDE, PRESERVATIVE FREE 40 MG: 5 INJECTION INTRAVENOUS at 08:44

## 2024-06-16 RX ADMIN — LORAZEPAM 1 MG: 2 INJECTION INTRAMUSCULAR; INTRAVENOUS at 04:52

## 2024-06-16 RX ADMIN — PETROLATUM: 420 OINTMENT TOPICAL at 20:35

## 2024-06-16 RX ADMIN — DESMOPRESSIN ACETATE 10 MCG: 4 INJECTION, SOLUTION INTRAVENOUS; SUBCUTANEOUS at 20:21

## 2024-06-16 ASSESSMENT — PULMONARY FUNCTION TESTS
PIF_VALUE: 18
PIF_VALUE: 13
PIF_VALUE: 16
PIF_VALUE: 17
PIF_VALUE: 13
PIF_VALUE: 16
PIF_VALUE: 15
PIF_VALUE: 16
PIF_VALUE: 17
PIF_VALUE: 12
PIF_VALUE: 19
PIF_VALUE: 16
PIF_VALUE: 18
PIF_VALUE: 20
PIF_VALUE: 19
PIF_VALUE: 15
PIF_VALUE: 14
PIF_VALUE: 15
PIF_VALUE: 16
PIF_VALUE: 15
PIF_VALUE: 25
PIF_VALUE: 15
PIF_VALUE: 15
PIF_VALUE: 17
PIF_VALUE: 19
PIF_VALUE: 12

## 2024-06-16 ASSESSMENT — PAIN SCALES - GENERAL
PAINLEVEL_OUTOF10: 2
PAINLEVEL_OUTOF10: 3
PAINLEVEL_OUTOF10: 2
PAINLEVEL_OUTOF10: 0
PAINLEVEL_OUTOF10: 2
PAINLEVEL_OUTOF10: 2

## 2024-06-16 NOTE — CONSULTS
Palliative Care Department  [phone]  Palliative Care Initial Consult  Provider Lucia Coffey, JANAE - CNP      PATIENT: Jose G Will  : 1965  MRN: [id number]  ADMISSION DATE: 2024  7:18 AM  Referring Provider: Flash Graham MD     Palliative Medicine was consulted on hospital day 8 for assistance with Goals of care    HPI:     Clinical Summary:Jose G Will is a 59 y.o. y/o adult with a history of hypothyroidism, myasthenia gravis, depression, asthma, GERD, hyperlipidemia, hypertension, suicide 10 who presented to [facility] on 2024 with altered mental status.  She was admitted for possible right arm cellulitis and hypernatremia.  Patient's mental status was not improving and on  she was unable to protect her airway, she was intubated and transferred to the ICU.  Bronchoscopy found thick secretions.  Cultures were positive for Staph aureus and she continues on antibiotics for pneumonia.  She was also positive for parainfluenza.  CT of the head is negative.  EEG showed abnormal activity which could be from sedation or hypoxic ischemic encephalopathy.  She remains intubated and admitted to the ICU.    ASSESSMENT/PLAN:     Pertinent Hospital Diagnoses     Acute hypoxic respiratory failure  Staphylococcus aureus pneumonia  Parainfluenza  Acute metabolic encephalopathy  Myasthenia gravis    Palliative Care Encounter / Counseling Regarding Goals of Care  Please see detailed goals of care discussion as below  At this time, Jose G Will, Does Not have capacity for medical decision-making.  Capacity is time limited and situation/question specific  During encounter Krish was surrogate medical decision-maker  Outcome of goals of care meeting:  Continue current medical management  Continue Full Code  Code status Full Code  Advanced Directives: no POA or living will in [facility]  Surrogate/Legal NOK:  Krish Will (child) [phone]  Jayson Pickett (brother) [phone]    Spiritual 
Critical Care Admit/Consult Note     Patient - Jose G Will   MRN -  [id number]   [id number]   - 1965      Date of Admission -  2024  7:18 AM  Date of evaluation -  2024  4410/4410-A   Hospital Day - 4    Assessment and Plan  Ms. Pebbles Will (Dennis) is a 59 y.o. adult with the following medical problems:     Acute metabolic encephalopathy, head CT negative  Acute respiratory failure s/p intubation 2024 due to inability to protect airway, s/p Bronch 24  Parainfulenza virus  Right arm cellulitis  Hypernatremia, concerns for nephrogenic diabetes insipidus  Hypokalemia  Hypothyroidism  Hypertension, now with border line blood pressure  Urinary retention  H/o suicide attempt   H/o myasthenia gravis  Major depressive disorder/ROSY/PTSD/borderline personality disorder  Gender dysphoria  H/o Dvt/PE, on indefinite anticoagulation  HLD  ---------------------------------------------------------------------------------------------------------  Neurology following, appreciate recommendations  Continue to assess neurovascular status  Continue home meds such as imuran, sinemet, divalproex,  finasteride,   Wean FiO2 as tolerated, keep oxygen saturation >92%  Follow CXR and sputum sample  Vent Bundle  SAT/SBT when appropriate  Daily ABGs and prn with vent changes  DDAVP stopped by nephrology, was previously getting SubQ  Nephrology following, appreciate recommendations  Continue to follow sodium, D5W per nephrology  Obtain urine studies  Replace potassium  Monitor off antibiotics per ID, appreciate recommendations  Hold antihypertensives for now  Strict I's and O's  Droplet plus contact isolation  DVT prophylaxis: Apixaban  GI prophylaxis: PPI    History of Present Illness: Pebbles Will (Dennis) is a 59 year old who identifies as female, was originally admitted on 2024 from [facility] with concerns for AMS. When the patient was evaluated in the ED, they were 
Department of Internal Medicine  Infectious Diseases   Consult Note      Reason for Consult:  right arm cellulitis           Requesting Physician:  Dr Chavez         HISTORY OF PRESENT ILLNESS:      Pt is non conversant  at this time . This is a 59 yrs old person  with hx of HTN, GERD, PTSD, Depression, hypothyroidism from [facility] presented to the ER with change in mental status . There has been no fever , no resp distress .   WBC was 7.2 K  Cr 1.0  Na - up to 167 ,Cr 133  Noted to have erythema left arm - vancomycin given     Past Medical History:      HTN, GERD, PTSD, Depression, hypothyroidism       Past Surgical History:      History reviewed. No pertinent surgical history.      Current Medications:      Current Facility-Administered Medications   Medication Dose Route Frequency Provider Last Rate Last Admin    sodium chloride flush 0.9 % injection 5-40 mL  5-40 mL IntraVENous 2 times per day Carrier, NovaMANJIT breenN - CNP   10 mL at 06/02/24 2044    sodium chloride flush 0.9 % injection 5-40 mL  5-40 mL IntraVENous PRN Carrier, NovaMANJIT breenN - CNP        0.9 % sodium chloride infusion   IntraVENous PRN Carrier, JANAE Bhatt - CNP        potassium chloride (KLOR-CON M) extended release tablet 40 mEq  40 mEq Oral PRN Carrier, NovaMANJIT breenN - CNP        Or    potassium bicarb-citric acid (EFFER-K) effervescent tablet 40 mEq  40 mEq Oral PRN Carrier, Nova, APRN - CNP        Or    potassium chloride 10 mEq/100 mL IVPB (Peripheral Line)  10 mEq IntraVENous PRN Carrier, Nova, APRN - CNP        magnesium sulfate 2000 mg in 50 mL IVPB premix  2,000 mg IntraVENous PRN Carrier, JANAE Bhatt - CNP        ondansetron (ZOFRAN-ODT) disintegrating tablet 4 mg  4 mg Oral Q8H PRN Carrier, Nova, APRN - CNP        Or    ondansetron (ZOFRAN) injection 4 mg  4 mg IntraVENous Q6H PRN Carrier, Nova, APRN - CNP        polyethylene glycol (GLYCOLAX) packet 17 g  17 g Oral Daily PRN Carrier, Nova 
GENERAL SURGERY  CONSULT NOTE  6/16/2024    Physician Consulted: Dr. Villavicencio  Reason for Consult: Trach/PEG    HPI  Jose G Will is a 59 y.o. adult, transgender female, with hx MS, BPD1 , COPD, myasthenia gravis, who presented 6/2 with AMS in the setting of sepsis +parainfluenza vs. RUE cellulitis. Wheelchair bound at baseline resides in an adult facility for suicide attempt, baseline speech is garbled but improves throughout day. She had acute increase in Na to 170s for which nephro was consulted during her stay 2/2 lithium induced nephrotoxic DI. Despite treatment mental status remained poor, patient began having difficulty with secretions and tachypnea 6/6 and was transferred to MICU for intubation due to acute respiratory failure and inability to maintain safe airway. + MSSA PNA on bronchoscopy.   EEG showed encephalopathy without seizure acitvity. MRI and LP was ordered.     Patient will track and open eyes to her name. Code status DNR CCA, family is considering trach/peg. We are consulted as she is now post-intubation day 11 and failing SAT/SBT    Afebrile, -123  No pressors     430/12/35/8  7.478/30.1/112.8/21.8    CBC 10.6/7.9/278 INR 1.2    Eliquis at home, held 6/14     History reviewed. No pertinent past medical history.    History reviewed. No pertinent surgical history.    Medications Prior to Admission    Prior to Admission medications    Medication Sig Start Date End Date Taking? Authorizing Provider   carbidopa-levodopa (SINEMET)  MG per tablet Take 1 tablet by mouth 3 times daily   Yes ProviderBarrera MD   gabapentin (NEURONTIN) 800 MG tablet Take 1 tablet by mouth 3 times daily.   Yes ProviderBarrera MD   oxyBUTYnin (DITROPAN) 5 MG tablet Take 1 tablet by mouth 2 times daily   Yes aBrrera Diamond MD   atorvastatin (LIPITOR) 10 MG tablet Take 1 tablet by mouth nightly   Yes ProviderBarrera MD   propranolol (INDERAL) 10 MG tablet Take 1 tablet by mouth 2 times 
Nephrology Consult  [facility]  Nolan Jones MD    CC:   hypernatremia    HPI:   the pt is a 58 yo male who goes as a female with a pmh of hld, gerd, hypothyroidm BPD, anxiety, depression who was sent in from [facility] for ms changes. They have been on lithium. History is obtained from the chart as pt is not able to speak. 3 RNS are in the room drawing blood and holding them down. An iv was just placed so no ivf were going since last night. They are moaning. Labs show na 148 on 6/2 and 167 this morning with repeat of 171, k 3.5, co2 19, bun 7, cr 1.1, ca 10.3, alb 3. Nh4 29, mg 2.5, cortisol 12.3, uds negative, wbc 6.7, hgb 10.2, plt 353, Li level 0.7. resp panel shows parainfluenza virus. Ua shows sg of <1.005. head ct, ct cervical spine, and cxr show no abnormality. Vitals show temp 98.4, rr 20, hr 96, bp 155/87. Uo is 1.7 last shift and was 3.1 overnight.     PMH:    History reviewed. No pertinent past medical history.    Patient Active Problem List   Diagnosis    AMS (altered mental status)    Cellulitis    Hypernatremia    HLD (hyperlipidemia)    GERD (gastroesophageal reflux disease)    Asthma    Hypothyroid    Depression    Anxiety       Meds:     divalproex  125 mg Oral 2 times per day    melatonin  6 mg Oral QPM    sodium chloride flush  5-40 mL IntraVENous 2 times per day    amLODIPine  2.5 mg Oral Daily    apixaban  2.5 mg Oral BID    atorvastatin  10 mg Oral QHS    carbidopa-levodopa  1 tablet Oral TID    famotidine  20 mg Oral Daily    finasteride  5 mg Oral Daily    [Held by provider] gabapentin  800 mg Oral TID    medroxyPROGESTERone  10 mg Oral Daily    oxyBUTYnin  5 mg Oral BID    pantoprazole  40 mg Oral Daily    propranolol  10 mg Oral BID    tamsulosin  0.4 mg Oral QHS    azaTHIOprine  150 mg Oral Daily        dextrose 125 mL/hr at 06/03/24 1136    sodium chloride         Meds prn:     sodium chloride flush, sodium chloride, potassium chloride **OR** potassium alternative oral 
Please see progress notes dated 6/3/24 at 1300.   
mastoid air cells demonstrate no acute abnormality.  Ethmoid and left maxillary sinus disease. SOFT TISSUES/SKULL:  No acute abnormality of the visualized skull or soft tissues.  Chronic appearing nasal bone fracture.     No evidence of acute intracranial hemorrhage or mass effect.     XR CHEST PORTABLE    Result Date: 6/2/2024  EXAMINATION: ONE XRAY VIEW OF THE CHEST 6/2/2024 8:58 am COMPARISON: None. HISTORY: ORDERING SYSTEM PROVIDED HISTORY: Sepsis TECHNOLOGIST PROVIDED HISTORY: Reason for exam:->Sepsis FINDINGS: Cardiomegaly is identified.  There is no evidence of acute consolidation or infiltrate.  No active pleural disease is seen.  Mediastinal structures are unremarkable.  The tracheobronchial tree is midline and patent. Osteo-degenerative changes are noted involving the thoracic spine.     1. Cardiomegaly. 2. No evidence of acute cardiopulmonary process.     XR CHEST 1 VIEW    Result Date: 5/11/2024  * * *Final Report* * * DATE OF EXAM: May 11 2024  8:15PM   BRX   5290  -  XR CHEST 1V FRONTAL   / ACCESSION #  [id number] PROCEDURE REASON: Chest pain      * * * * Physician Interpretation * * * *  EXAMINATION:  CHEST RADIOGRAPH (SINGLE VIEW AP OR PA) CLINICAL HISTORY: Chest pain MQ:  XC1_5 Comparison:  May 4, 2024. May 3, 2024. RESULT: Lines, tubes, and devices:  None. Lungs and pleura:  No consolidation. No pleural effusion. Cardiomediastinal silhouette:  Normal cardiomediastinal silhouette. Other:  No definite acute osseous abnormality.    IMPRESSION: No acute radiographic abnormality. : KENDAL   Transcribe Date/Time: May 11 2024  9:06P Dictated by : JHONNY MUÑOZ MD This examination was interpreted and the report reviewed and electronically signed by: JHONNY MUÑOZ MD on May 11 2024  9:07PM  EST      The patient's records from referring provider and available information in the EHR was reviewed.      Impression:  Acute encephalopathy  Bilateral arm cellulitis  Persistent

## 2024-06-17 ENCOUNTER — APPOINTMENT (OUTPATIENT)
Dept: GENERAL RADIOLOGY | Age: 59
DRG: 004 | End: 2024-06-17
Payer: MEDICARE

## 2024-06-17 ENCOUNTER — APPOINTMENT (OUTPATIENT)
Dept: CT IMAGING | Age: 59
DRG: 004 | End: 2024-06-17
Payer: MEDICARE

## 2024-06-17 LAB
AADO2: 91.8 MMHG
ALBUMIN SERPL-MCNC: 2.8 G/DL (ref 3.5–5.2)
ALP SERPL-CCNC: 100 U/L (ref 40–129)
ALT SERPL-CCNC: 8 U/L (ref 0–40)
AMMONIA PLAS-SCNC: 13 UMOL/L (ref 16–60)
ANION GAP SERPL CALCULATED.3IONS-SCNC: 10 MMOL/L (ref 7–16)
APPEARANCE CSF: CLEAR
AST SERPL-CCNC: 19 U/L (ref 0–39)
B.E.: 1 MMOL/L (ref -3–3)
BASOPHILS # BLD: 0.05 K/UL (ref 0–0.2)
BASOPHILS NFR BLD: 1 % (ref 0–2)
BILIRUB SERPL-MCNC: 0.5 MG/DL (ref 0–1.2)
BUN SERPL-MCNC: 24 MG/DL (ref 6–23)
C GATTII+NEOFOR DNA CSF QL NAA+NON-PROBE: NOT DETECTED
CALCIUM SERPL-MCNC: 9.7 MG/DL (ref 8.6–10.2)
CHLORIDE SERPL-SCNC: 110 MMOL/L (ref 98–107)
CLOT CHECK: NORMAL
CMV DNA CSF QL NAA+NON-PROBE: NOT DETECTED
CO2 SERPL-SCNC: 25 MMOL/L (ref 22–29)
COHB: 0.6 % (ref 0–1.5)
COLOR CSF: COLORLESS
CREAT SERPL-MCNC: 0.9 MG/DL (ref 0.7–1.2)
CRITICAL: ABNORMAL
DATE ANALYZED: ABNORMAL
DATE LAST DOSE: ABNORMAL
DATE OF COLLECTION: ABNORMAL
E COLI K1 DNA CSF QL NAA+NON-PROBE: NOT DETECTED
EOSINOPHIL # BLD: 0.21 K/UL (ref 0.05–0.5)
EOSINOPHILS RELATIVE PERCENT: 2 % (ref 0–6)
ERYTHROCYTE [DISTWIDTH] IN BLOOD BY AUTOMATED COUNT: 15.4 % (ref 11.5–15)
ERYTHROCYTE [SEDIMENTATION RATE] IN BLOOD BY WESTERGREN METHOD: 120 MM/HR (ref 0–15)
FIO2: 35 %
GFR, ESTIMATED: >90 ML/MIN/1.73M2
GLUCOSE CSF-MCNC: 78 MG/DL (ref 40–70)
GLUCOSE SERPL-MCNC: 124 MG/DL (ref 74–99)
GP B STREP DNA CSF QL NAA+NON-PROBE: NOT DETECTED
HCO3: 24.1 MMOL/L (ref 22–26)
HCT VFR BLD AUTO: 25 % (ref 37–54)
HGB BLD-MCNC: 7.4 G/DL (ref 12.5–16.5)
HHB: 1.7 % (ref 0–5)
HHV6 DNA CSF QL NAA+NON-PROBE: NOT DETECTED
HSV1 DNA CSF QL NAA+NON-PROBE: NOT DETECTED
HSV2 DNA CSF QL NAA+NON-PROBE: NOT DETECTED
IGA SERPL-MCNC: 92 MG/DL (ref 70–400)
IGG SERPL-MCNC: 534 MG/DL (ref 700–1600)
IGM SERPL-MCNC: 15 MG/DL (ref 40–230)
IMM GRANULOCYTES # BLD AUTO: 0.14 K/UL (ref 0–0.58)
IMM GRANULOCYTES NFR BLD: 1 % (ref 0–5)
INR PPP: 1.2
L MONOCYTOG DNA CSF QL NAA+NON-PROBE: NOT DETECTED
LAB: ABNORMAL
LYMPHOCYTES NFR BLD: 1.01 K/UL (ref 1.5–4)
LYMPHOCYTES RELATIVE PERCENT: 9 % (ref 20–42)
Lab: 450
MAGNESIUM SERPL-MCNC: 2.2 MG/DL (ref 1.6–2.6)
MCH RBC QN AUTO: 32 PG (ref 26–35)
MCHC RBC AUTO-ENTMCNC: 29.6 G/DL (ref 32–34.5)
MCV RBC AUTO: 108.2 FL (ref 80–99.9)
METHB: 0.5 % (ref 0–1.5)
MODE: AC
MONOCYTES NFR BLD: 0.95 K/UL (ref 0.1–0.95)
MONOCYTES NFR BLD: 9 % (ref 2–12)
N MEN DNA CSF QL NAA+NON-PROBE: NOT DETECTED
NEUTROPHILS NFR BLD: 78 % (ref 43–80)
NEUTS SEG NFR BLD: 8.48 K/UL (ref 1.8–7.3)
O2 SATURATION: 98.7 % (ref 92–98.5)
O2HB: 97.2 % (ref 94–97)
OPERATOR ID: 7221
PARTIAL THROMBOPLASTIN TIME: 34.7 SEC (ref 24.5–35.1)
PATIENT TEMP: 37 C
PCO2: 31.8 MMHG (ref 35–45)
PEEP/CPAP: 8 CMH2O
PFO2: 3.45 MMHG/%
PH BLOOD GAS: 7.5 (ref 7.35–7.45)
PHOSPHATE SERPL-MCNC: 4.5 MG/DL (ref 2.5–4.5)
PLATELET # BLD AUTO: 316 K/UL (ref 130–450)
PMV BLD AUTO: 10.7 FL (ref 7–12)
PO2: 120.8 MMHG (ref 75–100)
POTASSIUM SERPL-SCNC: 3.4 MMOL/L (ref 3.5–5)
PROT CSF-MCNC: 52.9 MG/DL (ref 15–40)
PROT SERPL-MCNC: 5.8 G/DL (ref 6.4–8.3)
PROTHROMBIN TIME: 13.3 SEC (ref 9.3–12.4)
RBC # BLD AUTO: 2.31 M/UL (ref 3.8–5.8)
RI(T): 0.76
RR MECHANICAL: 12 B/MIN
S PNEUM DNA CSF QL NAA+NON-PROBE: NOT DETECTED
SODIUM SERPL-SCNC: 145 MMOL/L (ref 132–146)
SOURCE, BLOOD GAS: ABNORMAL
SPECIMEN VOL CSF: NORMAL ML
THB: 8.2 G/DL (ref 11.5–16.5)
TIME ANALYZED: 504
TME LAST DOSE: ABNORMAL H
TUBE # CSF: 3
VALPROATE SERPL-MCNC: 28 UG/ML (ref 50–100)
VANCOMYCIN DOSE: ABNORMAL MG
VANCOMYCIN TROUGH SERPL-MCNC: 27.3 UG/ML (ref 5–16)
VANCOMYCIN TROUGH SERPL-MCNC: 30.6 UG/ML (ref 5–16)
VIT B12 SERPL-MCNC: 1247 PG/ML (ref 211–946)
VT MECHANICAL: 430 ML
VZV DNA CSF QL NAA+NON-PROBE: NOT DETECTED
WBC OTHER # BLD: 10.8 K/UL (ref 4.5–11.5)

## 2024-06-17 PROCEDURE — 2000000000 HC ICU R&B

## 2024-06-17 PROCEDURE — 2500000003 HC RX 250 WO HCPCS: Performed by: INTERNAL MEDICINE

## 2024-06-17 PROCEDURE — 84100 ASSAY OF PHOSPHORUS: CPT

## 2024-06-17 PROCEDURE — 6360000002 HC RX W HCPCS: Performed by: INTERNAL MEDICINE

## 2024-06-17 PROCEDURE — 6360000002 HC RX W HCPCS: Performed by: NURSE PRACTITIONER

## 2024-06-17 PROCEDURE — 99222 1ST HOSP IP/OBS MODERATE 55: CPT | Performed by: SURGERY

## 2024-06-17 PROCEDURE — 82140 ASSAY OF AMMONIA: CPT

## 2024-06-17 PROCEDURE — 87483 CNS DNA AMP PROBE TYPE 12-25: CPT

## 2024-06-17 PROCEDURE — 6370000000 HC RX 637 (ALT 250 FOR IP): Performed by: NURSE PRACTITIONER

## 2024-06-17 PROCEDURE — 2580000003 HC RX 258: Performed by: INTERNAL MEDICINE

## 2024-06-17 PROCEDURE — 6370000000 HC RX 637 (ALT 250 FOR IP): Performed by: INTERNAL MEDICINE

## 2024-06-17 PROCEDURE — 89050 BODY FLUID CELL COUNT: CPT

## 2024-06-17 PROCEDURE — 99291 CRITICAL CARE FIRST HOUR: CPT | Performed by: INTERNAL MEDICINE

## 2024-06-17 PROCEDURE — 87070 CULTURE OTHR SPECIMN AEROBIC: CPT

## 2024-06-17 PROCEDURE — 84157 ASSAY OF PROTEIN OTHER: CPT

## 2024-06-17 PROCEDURE — 82784 ASSAY IGA/IGD/IGG/IGM EACH: CPT

## 2024-06-17 PROCEDURE — 82805 BLOOD GASES W/O2 SATURATION: CPT

## 2024-06-17 PROCEDURE — 74018 RADEX ABDOMEN 1 VIEW: CPT

## 2024-06-17 PROCEDURE — 86341 ISLET CELL ANTIBODY: CPT

## 2024-06-17 PROCEDURE — 85610 PROTHROMBIN TIME: CPT

## 2024-06-17 PROCEDURE — 99232 SBSQ HOSP IP/OBS MODERATE 35: CPT | Performed by: INTERNAL MEDICINE

## 2024-06-17 PROCEDURE — 87015 SPECIMEN INFECT AGNT CONCNTJ: CPT

## 2024-06-17 PROCEDURE — 71045 X-RAY EXAM CHEST 1 VIEW: CPT

## 2024-06-17 PROCEDURE — 70450 CT HEAD/BRAIN W/O DYE: CPT

## 2024-06-17 PROCEDURE — 82785 ASSAY OF IGE: CPT

## 2024-06-17 PROCEDURE — 94003 VENT MGMT INPAT SUBQ DAY: CPT

## 2024-06-17 PROCEDURE — 009U3ZX DRAINAGE OF SPINAL CANAL, PERCUTANEOUS APPROACH, DIAGNOSTIC: ICD-10-PCS | Performed by: INTERNAL MEDICINE

## 2024-06-17 PROCEDURE — 82962 GLUCOSE BLOOD TEST: CPT

## 2024-06-17 PROCEDURE — C9113 INJ PANTOPRAZOLE SODIUM, VIA: HCPCS

## 2024-06-17 PROCEDURE — 85730 THROMBOPLASTIN TIME PARTIAL: CPT

## 2024-06-17 PROCEDURE — 86255 FLUORESCENT ANTIBODY SCREEN: CPT

## 2024-06-17 PROCEDURE — 87205 SMEAR GRAM STAIN: CPT

## 2024-06-17 PROCEDURE — 2580000003 HC RX 258

## 2024-06-17 PROCEDURE — 80164 ASSAY DIPROPYLACETIC ACD TOT: CPT

## 2024-06-17 PROCEDURE — 2709999900 FL LUMBAR PUNCTURE DIAG

## 2024-06-17 PROCEDURE — 6370000000 HC RX 637 (ALT 250 FOR IP)

## 2024-06-17 PROCEDURE — 80053 COMPREHEN METABOLIC PANEL: CPT

## 2024-06-17 PROCEDURE — 99231 SBSQ HOSP IP/OBS SF/LOW 25: CPT

## 2024-06-17 PROCEDURE — 83735 ASSAY OF MAGNESIUM: CPT

## 2024-06-17 PROCEDURE — 80202 ASSAY OF VANCOMYCIN: CPT

## 2024-06-17 PROCEDURE — 99233 SBSQ HOSP IP/OBS HIGH 50: CPT | Performed by: NURSE PRACTITIONER

## 2024-06-17 PROCEDURE — 82945 GLUCOSE OTHER FLUID: CPT

## 2024-06-17 PROCEDURE — 85652 RBC SED RATE AUTOMATED: CPT

## 2024-06-17 PROCEDURE — 6360000002 HC RX W HCPCS

## 2024-06-17 PROCEDURE — 85025 COMPLETE CBC W/AUTO DIFF WBC: CPT

## 2024-06-17 PROCEDURE — 2580000003 HC RX 258: Performed by: PHYSICIAN ASSISTANT

## 2024-06-17 PROCEDURE — 84182 PROTEIN WESTERN BLOT TEST: CPT

## 2024-06-17 PROCEDURE — 82607 VITAMIN B-12: CPT

## 2024-06-17 PROCEDURE — 82787 IGG 1 2 3 OR 4 EACH: CPT

## 2024-06-17 RX ORDER — MIDAZOLAM HYDROCHLORIDE 1 MG/ML
1-10 INJECTION, SOLUTION INTRAVENOUS CONTINUOUS
Status: DISCONTINUED | OUTPATIENT
Start: 2024-06-17 | End: 2024-06-20 | Stop reason: HOSPADM

## 2024-06-17 RX ORDER — VECURONIUM BROMIDE 1 MG/ML
10 INJECTION, POWDER, LYOPHILIZED, FOR SOLUTION INTRAVENOUS
Status: DISPENSED | OUTPATIENT
Start: 2024-06-17 | End: 2024-06-18

## 2024-06-17 RX ORDER — FENTANYL CITRATE 50 UG/ML
100 INJECTION, SOLUTION INTRAMUSCULAR; INTRAVENOUS
Status: DISPENSED | OUTPATIENT
Start: 2024-06-17 | End: 2024-06-18

## 2024-06-17 RX ORDER — MIDAZOLAM HYDROCHLORIDE 2 MG/2ML
2 INJECTION, SOLUTION INTRAMUSCULAR; INTRAVENOUS ONCE
Status: COMPLETED | OUTPATIENT
Start: 2024-06-17 | End: 2024-06-17

## 2024-06-17 RX ORDER — FENTANYL CITRATE-0.9 % NACL/PF 10 MCG/ML
25-200 PLASTIC BAG, INJECTION (ML) INTRAVENOUS CONTINUOUS
Status: DISCONTINUED | OUTPATIENT
Start: 2024-06-17 | End: 2024-06-20 | Stop reason: HOSPADM

## 2024-06-17 RX ORDER — MIDAZOLAM HYDROCHLORIDE 2 MG/2ML
4 INJECTION, SOLUTION INTRAMUSCULAR; INTRAVENOUS SEE ADMIN INSTRUCTIONS
Status: DISCONTINUED | OUTPATIENT
Start: 2024-06-17 | End: 2024-06-20 | Stop reason: HOSPADM

## 2024-06-17 RX ADMIN — GABAPENTIN 800 MG: 400 CAPSULE ORAL at 16:53

## 2024-06-17 RX ADMIN — TAMSULOSIN HYDROCHLORIDE 0.4 MG: 0.4 CAPSULE ORAL at 21:00

## 2024-06-17 RX ADMIN — POLYVINYL ALCOHOL, POVIDONE 2 DROP: 14; 6 SOLUTION/ DROPS OPHTHALMIC at 17:07

## 2024-06-17 RX ADMIN — QUETIAPINE FUMARATE 200 MG: 100 TABLET ORAL at 21:00

## 2024-06-17 RX ADMIN — POTASSIUM BICARBONATE 40 MEQ: 782 TABLET, EFFERVESCENT ORAL at 09:04

## 2024-06-17 RX ADMIN — OXYBUTYNIN CHLORIDE 5 MG: 5 TABLET ORAL at 21:00

## 2024-06-17 RX ADMIN — DESMOPRESSIN ACETATE 10 MCG: 4 INJECTION, SOLUTION INTRAVENOUS; SUBCUTANEOUS at 09:33

## 2024-06-17 RX ADMIN — POLYVINYL ALCOHOL, POVIDONE 2 DROP: 14; 6 SOLUTION/ DROPS OPHTHALMIC at 14:00

## 2024-06-17 RX ADMIN — Medication 2 MG/HR: at 13:46

## 2024-06-17 RX ADMIN — GABAPENTIN 800 MG: 400 CAPSULE ORAL at 09:03

## 2024-06-17 RX ADMIN — VALPROATE SODIUM 750 MG: 100 INJECTION, SOLUTION INTRAVENOUS at 00:27

## 2024-06-17 RX ADMIN — POLYVINYL ALCOHOL, POVIDONE 2 DROP: 14; 6 SOLUTION/ DROPS OPHTHALMIC at 03:57

## 2024-06-17 RX ADMIN — Medication 50 MCG/HR: at 13:45

## 2024-06-17 RX ADMIN — SODIUM CHLORIDE, PRESERVATIVE FREE 10 ML: 5 INJECTION INTRAVENOUS at 21:00

## 2024-06-17 RX ADMIN — DESMOPRESSIN ACETATE 10 MCG: 4 INJECTION, SOLUTION INTRAVENOUS; SUBCUTANEOUS at 22:12

## 2024-06-17 RX ADMIN — CARBIDOPA AND LEVODOPA 1 TABLET: 25; 100 TABLET ORAL at 21:00

## 2024-06-17 RX ADMIN — CARBIDOPA AND LEVODOPA 1 TABLET: 25; 100 TABLET ORAL at 09:03

## 2024-06-17 RX ADMIN — FINASTERIDE 5 MG: 5 TABLET, FILM COATED ORAL at 09:04

## 2024-06-17 RX ADMIN — VALPROATE SODIUM 750 MG: 100 INJECTION, SOLUTION INTRAVENOUS at 12:47

## 2024-06-17 RX ADMIN — AMLODIPINE BESYLATE 10 MG: 10 TABLET ORAL at 09:04

## 2024-06-17 RX ADMIN — CARBIDOPA AND LEVODOPA 1 TABLET: 25; 100 TABLET ORAL at 16:53

## 2024-06-17 RX ADMIN — POLYVINYL ALCOHOL, POVIDONE 2 DROP: 14; 6 SOLUTION/ DROPS OPHTHALMIC at 10:00

## 2024-06-17 RX ADMIN — MIDAZOLAM 2 MG: 1 INJECTION INTRAMUSCULAR; INTRAVENOUS at 13:41

## 2024-06-17 RX ADMIN — POLYVINYL ALCOHOL, POVIDONE 2 DROP: 14; 6 SOLUTION/ DROPS OPHTHALMIC at 21:00

## 2024-06-17 RX ADMIN — CHLORHEXIDINE GLUCONATE, 0.12% ORAL RINSE 15 ML: 1.2 SOLUTION DENTAL at 21:00

## 2024-06-17 RX ADMIN — SODIUM CHLORIDE, PRESERVATIVE FREE 40 MG: 5 INJECTION INTRAVENOUS at 09:04

## 2024-06-17 RX ADMIN — AZATHIOPRINE 150 MG: 50 TABLET ORAL at 09:03

## 2024-06-17 RX ADMIN — ATORVASTATIN CALCIUM 10 MG: 10 TABLET, FILM COATED ORAL at 21:00

## 2024-06-17 RX ADMIN — QUETIAPINE FUMARATE 200 MG: 100 TABLET ORAL at 09:03

## 2024-06-17 RX ADMIN — PETROLATUM: 420 OINTMENT TOPICAL at 21:01

## 2024-06-17 RX ADMIN — OXYBUTYNIN CHLORIDE 5 MG: 5 TABLET ORAL at 09:03

## 2024-06-17 RX ADMIN — GABAPENTIN 800 MG: 400 CAPSULE ORAL at 21:00

## 2024-06-17 ASSESSMENT — PULMONARY FUNCTION TESTS
PIF_VALUE: 15
PIF_VALUE: 30
PIF_VALUE: 17
PIF_VALUE: 16
PIF_VALUE: 18
PIF_VALUE: 17
PIF_VALUE: 32
PIF_VALUE: 19
PIF_VALUE: 17
PIF_VALUE: 15
PIF_VALUE: 17
PIF_VALUE: 19
PIF_VALUE: 1
PIF_VALUE: 17
PIF_VALUE: 17
PIF_VALUE: 18
PIF_VALUE: 20
PIF_VALUE: 17
PIF_VALUE: 13
PIF_VALUE: 48
PIF_VALUE: 20
PIF_VALUE: 17
PIF_VALUE: 16
PIF_VALUE: 19
PIF_VALUE: 18
PIF_VALUE: 17

## 2024-06-17 ASSESSMENT — PAIN SCALES - GENERAL
PAINLEVEL_OUTOF10: 2
PAINLEVEL_OUTOF10: 2
PAINLEVEL_OUTOF10: 0

## 2024-06-17 NOTE — CARE COORDINATION
Care Coordination: LOS 15 day. Neurology consulted and following for AMS. Per neuro note- Ct head neg, EEG without seizures, remains intubated, not sedated- no improvement in mental status. Unable to do MRI- body habitus, hx of MG.  son is agreeable to proceed to LP- plan for trach/peg Tuesday.   [facility], she will follow post trach/peg to check ICU bed and availability to transfer.  DNR CCA- Palliative following. Will follow    Electronically signed by Mary Mccallum RN on 6/17/2024 at 3:21 PM

## 2024-06-17 NOTE — OP NOTE
Operative Note  ______________________________________________________________      IR/FL  LUMBAR PUNCTURE  [facility] 4WE MICU    Patient Name: Jose G Will   YOB: 1965  Medical Record Number: [id number]  Date of Procedure: 6/17/24  Room/Bed: [address] [place] [address]-A    Preoperative diagnosis: AMS and Possible Meningitis    Postoperative diagnosis: Same    CONSENT:  Informed consent was obtained from the daughter prior to the procedure. The details of the procedure, as well is its risks, benefits, and alternatives, were explained. These risks include, but are not limited to, nerve root injury, bleeding, infection (including meningitis), dural leak (possibly requiring blood patch treatment), and spinal headache. Opportunity was provided to ask questions, which were answered.     Procedure: Fluoroscopic-guided lumbar puncture    Anesthesia: Local anesthesia with approximately 5 mL of 1% Lidocaine without epinephrine administered subcutaneously.    Performed by: WILL Gardner under on-site supervision by Chemo Hylton MD.    Estimated blood loss: None    Complications: None.    Implants: None    Procedure details:   A preprocedure timeout was performed. With the patient in a left anterior oblique prone position on the exam table, fluoroscopy was used to select a suitable interlaminar space for lumbar puncture. The site was marked on the skin. The area was sterilely prepped and draped. Lidocaine (1%) was subcutaneously injected for local anesthesia. Under intermittent fluoroscopic guidance, a 20-gauge spinal needle was percutaneously advanced through the interlaminar space at L2-L3 and advanced into the thecal sac, with return of clear colorless cerebral spinal fluid. The stylet was reinserted and the needle was removed. There were no complications.        A total of  12mL of CSF was serially collected in four sterile tubes. The specimens were submitted for laboratory analysis by Radiology staff according to the ordering

## 2024-06-17 NOTE — BRIEF OP NOTE
Brief-Op Note  ______________________________________________________________      IR/FL  LUMBAR PUNCTURE  [facility] 4WE MICU    Patient Name: Jose G Will   YOB: 1965  Medical Record Number: [id number]  Date of Procedure: 6/17/24  Room/Bed: [address] [place] [address]    Preoperative diagnosis: AMS and Possible Meningitis    Postoperative diagnosis: Same    CONSENT:  Informed consent was obtained from the daughter via phone verified by 2 additional witnesses prior to the procedure.     Procedure: Fluoroscopic-guided lumbar puncture    Anesthesia: Local anesthesia administered subcutaneously.    Performed by: WILL Gardner under on-site supervision by Chemo Hylton MD.    Estimated blood loss: None    Specimen Obtained: 4 vials of CSF (see lab order sheet for fluid details)    Complications: None.    Implants: None    Electronically signed by WILL Gardner   DD: 6/17/24  4:07 PM

## 2024-06-18 ENCOUNTER — APPOINTMENT (OUTPATIENT)
Dept: GENERAL RADIOLOGY | Age: 59
DRG: 004 | End: 2024-06-18
Payer: MEDICARE

## 2024-06-18 LAB
ABO + RH BLD: NORMAL
ALBUMIN SERPL-MCNC: 2.5 G/DL (ref 3.5–5.2)
ALP SERPL-CCNC: 141 U/L (ref 40–129)
ALT SERPL-CCNC: 9 U/L (ref 0–40)
ARM BAND NUMBER: NORMAL
AST SERPL-CCNC: 17 U/L (ref 0–39)
B.E.: 2.5 MMOL/L (ref -3–3)
BASOPHILS # BLD: 0.02 K/UL (ref 0–0.2)
BASOPHILS NFR BLD: 0 % (ref 0–2)
BILIRUB SERPL-MCNC: 0.4 MG/DL (ref 0–1.2)
BLOOD BANK SAMPLE EXPIRATION: NORMAL
BLOOD GROUP ANTIBODIES SERPL: NEGATIVE
BUN SERPL-MCNC: 22 MG/DL (ref 6–23)
CALCIUM SERPL-MCNC: 9.6 MG/DL (ref 8.6–10.2)
CHLORIDE SERPL-SCNC: 112 MMOL/L (ref 98–107)
CO2 SERPL-SCNC: 24 MMOL/L (ref 22–29)
COHB: 0.9 % (ref 0–1.5)
CREAT SERPL-MCNC: 0.8 MG/DL (ref 0.7–1.2)
CRITICAL: ABNORMAL
DATE ANALYZED: ABNORMAL
DATE OF COLLECTION: ABNORMAL
EOSINOPHIL # BLD: 0.17 K/UL (ref 0.05–0.5)
EOSINOPHILS RELATIVE PERCENT: 1 % (ref 0–6)
ERYTHROCYTE [DISTWIDTH] IN BLOOD BY AUTOMATED COUNT: 14.9 % (ref 11.5–15)
GFR, ESTIMATED: >90 ML/MIN/1.73M2
HCO3: 26.1 MMOL/L (ref 22–26)
HCT VFR BLD AUTO: 22.1 % (ref 37–54)
HCT VFR BLD AUTO: 24.7 % (ref 37–54)
HGB BLD-MCNC: 6.7 G/DL (ref 12.5–16.5)
HGB BLD-MCNC: 7.4 G/DL (ref 12.5–16.5)
HHB: 4.1 % (ref 0–5)
IGE SERPL-ACNC: 3 IU/ML (ref 0–100)
IMM GRANULOCYTES # BLD AUTO: 0.14 K/UL (ref 0–0.58)
IMM GRANULOCYTES NFR BLD: 1 % (ref 0–5)
INR PPP: 1.2
LAB: ABNORMAL
LYMPHOCYTES NFR BLD: 0.72 K/UL (ref 1.5–4)
LYMPHOCYTES RELATIVE PERCENT: 6 % (ref 20–42)
Lab: 454
MAGNESIUM SERPL-MCNC: 2 MG/DL (ref 1.6–2.6)
MCH RBC QN AUTO: 33 PG (ref 26–35)
MCHC RBC AUTO-ENTMCNC: 30.3 G/DL (ref 32–34.5)
MCV RBC AUTO: 108.9 FL (ref 80–99.9)
METHB: 0.5 % (ref 0–1.5)
MODE: AC
MONOCYTES NFR BLD: 0.88 K/UL (ref 0.1–0.95)
MONOCYTES NFR BLD: 7 % (ref 2–12)
O2 SATURATION: 97.1 % (ref 92–98.5)
O2HB: 94.5 % (ref 94–97)
PATIENT TEMP: 37 C
PCO2: 35.4 MMHG (ref 35–45)
PEEP/CPAP: 8 CMH2O
PFO2: 2.43 MMHG/%
PH BLOOD GAS: 7.49 (ref 7.35–7.45)
PHOSPHATE SERPL-MCNC: 3.7 MG/DL (ref 2.5–4.5)
PLATELET # BLD AUTO: 280 K/UL (ref 130–450)
PMV BLD AUTO: 10.5 FL (ref 7–12)
POTASSIUM SERPL-SCNC: 3.7 MMOL/L (ref 3.5–5)
PROT SERPL-MCNC: 5.3 G/DL (ref 6.4–8.3)
PROTHROMBIN TIME: 13.5 SEC (ref 9.3–12.4)
RBC # BLD AUTO: 2.03 M/UL (ref 3.8–5.8)
RI(T): 1.45
RR MECHANICAL: 12 B/MIN
SODIUM SERPL-SCNC: 147 MMOL/L (ref 132–146)
TIME ANALYZED: 458
VT MECHANICAL: 430 ML
WBC OTHER # BLD: 12.1 K/UL (ref 4.5–11.5)

## 2024-06-18 PROCEDURE — 36430 TRANSFUSION BLD/BLD COMPNT: CPT

## 2024-06-18 PROCEDURE — 2580000003 HC RX 258

## 2024-06-18 PROCEDURE — 85025 COMPLETE CBC W/AUTO DIFF WBC: CPT

## 2024-06-18 PROCEDURE — 6370000000 HC RX 637 (ALT 250 FOR IP): Performed by: INTERNAL MEDICINE

## 2024-06-18 PROCEDURE — 2580000003 HC RX 258: Performed by: INTERNAL MEDICINE

## 2024-06-18 PROCEDURE — C9113 INJ PANTOPRAZOLE SODIUM, VIA: HCPCS

## 2024-06-18 PROCEDURE — 85014 HEMATOCRIT: CPT

## 2024-06-18 PROCEDURE — 2500000003 HC RX 250 WO HCPCS: Performed by: INTERNAL MEDICINE

## 2024-06-18 PROCEDURE — 82805 BLOOD GASES W/O2 SATURATION: CPT

## 2024-06-18 PROCEDURE — 0DH68UZ INSERTION OF FEEDING DEVICE INTO STOMACH, VIA NATURAL OR ARTIFICIAL OPENING ENDOSCOPIC: ICD-10-PCS | Performed by: STUDENT IN AN ORGANIZED HEALTH CARE EDUCATION/TRAINING PROGRAM

## 2024-06-18 PROCEDURE — 51798 US URINE CAPACITY MEASURE: CPT

## 2024-06-18 PROCEDURE — 43246 EGD PLACE GASTROSTOMY TUBE: CPT | Performed by: SURGERY

## 2024-06-18 PROCEDURE — 2580000003 HC RX 258: Performed by: STUDENT IN AN ORGANIZED HEALTH CARE EDUCATION/TRAINING PROGRAM

## 2024-06-18 PROCEDURE — 83735 ASSAY OF MAGNESIUM: CPT

## 2024-06-18 PROCEDURE — 6360000002 HC RX W HCPCS: Performed by: STUDENT IN AN ORGANIZED HEALTH CARE EDUCATION/TRAINING PROGRAM

## 2024-06-18 PROCEDURE — 6370000000 HC RX 637 (ALT 250 FOR IP): Performed by: NURSE PRACTITIONER

## 2024-06-18 PROCEDURE — 36592 COLLECT BLOOD FROM PICC: CPT

## 2024-06-18 PROCEDURE — 2500000003 HC RX 250 WO HCPCS: Performed by: STUDENT IN AN ORGANIZED HEALTH CARE EDUCATION/TRAINING PROGRAM

## 2024-06-18 PROCEDURE — P9016 RBC LEUKOCYTES REDUCED: HCPCS

## 2024-06-18 PROCEDURE — 51701 INSERT BLADDER CATHETER: CPT

## 2024-06-18 PROCEDURE — 3E0G76Z INTRODUCTION OF NUTRITIONAL SUBSTANCE INTO UPPER GI, VIA NATURAL OR ARTIFICIAL OPENING: ICD-10-PCS | Performed by: STUDENT IN AN ORGANIZED HEALTH CARE EDUCATION/TRAINING PROGRAM

## 2024-06-18 PROCEDURE — C9113 INJ PANTOPRAZOLE SODIUM, VIA: HCPCS | Performed by: STUDENT IN AN ORGANIZED HEALTH CARE EDUCATION/TRAINING PROGRAM

## 2024-06-18 PROCEDURE — 2000000000 HC ICU R&B

## 2024-06-18 PROCEDURE — 85610 PROTHROMBIN TIME: CPT

## 2024-06-18 PROCEDURE — 6360000002 HC RX W HCPCS: Performed by: INTERNAL MEDICINE

## 2024-06-18 PROCEDURE — 3609013300 HC EGD TUBE PLACEMENT: Performed by: SURGERY

## 2024-06-18 PROCEDURE — 85018 HEMOGLOBIN: CPT

## 2024-06-18 PROCEDURE — 6370000000 HC RX 637 (ALT 250 FOR IP)

## 2024-06-18 PROCEDURE — 99233 SBSQ HOSP IP/OBS HIGH 50: CPT | Performed by: NURSE PRACTITIONER

## 2024-06-18 PROCEDURE — 31600 PLANNED TRACHEOSTOMY: CPT | Performed by: SURGERY

## 2024-06-18 PROCEDURE — 87015 SPECIMEN INFECT AGNT CONCNTJ: CPT

## 2024-06-18 PROCEDURE — 87102 FUNGUS ISOLATION CULTURE: CPT

## 2024-06-18 PROCEDURE — 86901 BLOOD TYPING SEROLOGIC RH(D): CPT

## 2024-06-18 PROCEDURE — 84100 ASSAY OF PHOSPHORUS: CPT

## 2024-06-18 PROCEDURE — 87205 SMEAR GRAM STAIN: CPT

## 2024-06-18 PROCEDURE — 6360000002 HC RX W HCPCS: Performed by: NURSE PRACTITIONER

## 2024-06-18 PROCEDURE — 87116 MYCOBACTERIA CULTURE: CPT

## 2024-06-18 PROCEDURE — 3609027000 HC BRONCHOSCOPY: Performed by: SURGERY

## 2024-06-18 PROCEDURE — 99232 SBSQ HOSP IP/OBS MODERATE 35: CPT | Performed by: INTERNAL MEDICINE

## 2024-06-18 PROCEDURE — 80053 COMPREHEN METABOLIC PANEL: CPT

## 2024-06-18 PROCEDURE — 94003 VENT MGMT INPAT SUBQ DAY: CPT

## 2024-06-18 PROCEDURE — 86850 RBC ANTIBODY SCREEN: CPT

## 2024-06-18 PROCEDURE — 2580000003 HC RX 258: Performed by: PHYSICIAN ASSISTANT

## 2024-06-18 PROCEDURE — 6360000002 HC RX W HCPCS

## 2024-06-18 PROCEDURE — 2709999900 HC NON-CHARGEABLE SUPPLY: Performed by: SURGERY

## 2024-06-18 PROCEDURE — 99291 CRITICAL CARE FIRST HOUR: CPT | Performed by: INTERNAL MEDICINE

## 2024-06-18 PROCEDURE — 86900 BLOOD TYPING SEROLOGIC ABO: CPT

## 2024-06-18 PROCEDURE — 87070 CULTURE OTHR SPECIMN AEROBIC: CPT

## 2024-06-18 PROCEDURE — 87206 SMEAR FLUORESCENT/ACID STAI: CPT

## 2024-06-18 PROCEDURE — 0B113F4 BYPASS TRACHEA TO CUTANEOUS WITH TRACHEOSTOMY DEVICE, PERCUTANEOUS APPROACH: ICD-10-PCS | Performed by: STUDENT IN AN ORGANIZED HEALTH CARE EDUCATION/TRAINING PROGRAM

## 2024-06-18 PROCEDURE — 71045 X-RAY EXAM CHEST 1 VIEW: CPT

## 2024-06-18 RX ORDER — FENTANYL CITRATE 50 UG/ML
100 INJECTION, SOLUTION INTRAMUSCULAR; INTRAVENOUS
Status: DISCONTINUED | OUTPATIENT
Start: 2024-06-18 | End: 2024-06-20 | Stop reason: HOSPADM

## 2024-06-18 RX ORDER — DOCUSATE SODIUM 50 MG/5ML
100 LIQUID ORAL DAILY
Status: DISCONTINUED | OUTPATIENT
Start: 2024-06-18 | End: 2024-06-20 | Stop reason: HOSPADM

## 2024-06-18 RX ORDER — LIDOCAINE HYDROCHLORIDE 10 MG/ML
5 INJECTION, SOLUTION INFILTRATION; PERINEURAL ONCE
Status: COMPLETED | OUTPATIENT
Start: 2024-06-18 | End: 2024-06-18

## 2024-06-18 RX ORDER — SODIUM CHLORIDE 9 MG/ML
INJECTION, SOLUTION INTRAVENOUS PRN
Status: DISCONTINUED | OUTPATIENT
Start: 2024-06-18 | End: 2024-06-20 | Stop reason: HOSPADM

## 2024-06-18 RX ORDER — LIDOCAINE HYDROCHLORIDE AND EPINEPHRINE 10; 10 MG/ML; UG/ML
20 INJECTION, SOLUTION INFILTRATION; PERINEURAL ONCE
Status: DISCONTINUED | OUTPATIENT
Start: 2024-06-18 | End: 2024-06-20 | Stop reason: HOSPADM

## 2024-06-18 RX ORDER — WATER 10 ML/10ML
INJECTION INTRAMUSCULAR; INTRAVENOUS; SUBCUTANEOUS
Status: COMPLETED
Start: 2024-06-18 | End: 2024-06-18

## 2024-06-18 RX ORDER — MIDAZOLAM HYDROCHLORIDE 2 MG/2ML
2 INJECTION, SOLUTION INTRAMUSCULAR; INTRAVENOUS ONCE
Status: COMPLETED | OUTPATIENT
Start: 2024-06-18 | End: 2024-06-18

## 2024-06-18 RX ORDER — VECURONIUM BROMIDE 1 MG/ML
10 INJECTION, POWDER, LYOPHILIZED, FOR SOLUTION INTRAVENOUS ONCE
Status: COMPLETED | OUTPATIENT
Start: 2024-06-18 | End: 2024-06-18

## 2024-06-18 RX ORDER — WATER 10 ML/10ML
10 INJECTION INTRAMUSCULAR; INTRAVENOUS; SUBCUTANEOUS ONCE
Status: COMPLETED | OUTPATIENT
Start: 2024-06-18 | End: 2024-06-18

## 2024-06-18 RX ADMIN — SODIUM CHLORIDE: 9 INJECTION, SOLUTION INTRAVENOUS at 11:45

## 2024-06-18 RX ADMIN — POLYVINYL ALCOHOL, POVIDONE 2 DROP: 14; 6 SOLUTION/ DROPS OPHTHALMIC at 21:46

## 2024-06-18 RX ADMIN — Medication 50 MCG/HR: at 05:47

## 2024-06-18 RX ADMIN — GABAPENTIN 800 MG: 400 CAPSULE ORAL at 16:01

## 2024-06-18 RX ADMIN — VALPROATE SODIUM 750 MG: 100 INJECTION, SOLUTION INTRAVENOUS at 11:45

## 2024-06-18 RX ADMIN — TAMSULOSIN HYDROCHLORIDE 0.4 MG: 0.4 CAPSULE ORAL at 21:46

## 2024-06-18 RX ADMIN — CHLORHEXIDINE GLUCONATE, 0.12% ORAL RINSE 15 ML: 1.2 SOLUTION DENTAL at 07:53

## 2024-06-18 RX ADMIN — FINASTERIDE 5 MG: 5 TABLET, FILM COATED ORAL at 07:49

## 2024-06-18 RX ADMIN — QUETIAPINE FUMARATE 200 MG: 100 TABLET ORAL at 21:45

## 2024-06-18 RX ADMIN — FENTANYL CITRATE 100 MCG: 50 INJECTION INTRAMUSCULAR; INTRAVENOUS at 11:22

## 2024-06-18 RX ADMIN — MIDAZOLAM 2 MG: 1 INJECTION INTRAMUSCULAR; INTRAVENOUS at 11:17

## 2024-06-18 RX ADMIN — ATORVASTATIN CALCIUM 10 MG: 10 TABLET, FILM COATED ORAL at 21:46

## 2024-06-18 RX ADMIN — AMLODIPINE BESYLATE 10 MG: 10 TABLET ORAL at 07:50

## 2024-06-18 RX ADMIN — GABAPENTIN 800 MG: 400 CAPSULE ORAL at 07:51

## 2024-06-18 RX ADMIN — LIDOCAINE HYDROCHLORIDE 5 ML: 10 SOLUTION INTRAVENOUS at 11:28

## 2024-06-18 RX ADMIN — DOCUSATE SODIUM LIQUID 100 MG: 100 LIQUID ORAL at 07:49

## 2024-06-18 RX ADMIN — PETROLATUM: 420 OINTMENT TOPICAL at 21:50

## 2024-06-18 RX ADMIN — POLYVINYL ALCOHOL, POVIDONE 2 DROP: 14; 6 SOLUTION/ DROPS OPHTHALMIC at 14:00

## 2024-06-18 RX ADMIN — VANCOMYCIN HYDROCHLORIDE 1000 MG: 1 INJECTION, POWDER, LYOPHILIZED, FOR SOLUTION INTRAVENOUS at 23:32

## 2024-06-18 RX ADMIN — CHLORHEXIDINE GLUCONATE, 0.12% ORAL RINSE 15 ML: 1.2 SOLUTION DENTAL at 21:46

## 2024-06-18 RX ADMIN — POLYVINYL ALCOHOL, POVIDONE 2 DROP: 14; 6 SOLUTION/ DROPS OPHTHALMIC at 16:02

## 2024-06-18 RX ADMIN — WATER 10 ML: 10 INJECTION INTRAMUSCULAR; INTRAVENOUS; SUBCUTANEOUS at 11:25

## 2024-06-18 RX ADMIN — POLYVINYL ALCOHOL, POVIDONE 2 DROP: 14; 6 SOLUTION/ DROPS OPHTHALMIC at 02:10

## 2024-06-18 RX ADMIN — ACETAMINOPHEN 650 MG: 325 TABLET ORAL at 20:13

## 2024-06-18 RX ADMIN — QUETIAPINE FUMARATE 200 MG: 100 TABLET ORAL at 07:49

## 2024-06-18 RX ADMIN — CHLORHEXIDINE GLUCONATE, 0.12% ORAL RINSE 15 ML: 1.2 SOLUTION DENTAL at 07:52

## 2024-06-18 RX ADMIN — AZATHIOPRINE 150 MG: 50 TABLET ORAL at 07:51

## 2024-06-18 RX ADMIN — VECURONIUM BROMIDE 10 MG: 1 INJECTION, POWDER, LYOPHILIZED, FOR SOLUTION INTRAVENOUS at 11:24

## 2024-06-18 RX ADMIN — VALPROATE SODIUM 750 MG: 100 INJECTION, SOLUTION INTRAVENOUS at 00:00

## 2024-06-18 RX ADMIN — CARBIDOPA AND LEVODOPA 1 TABLET: 25; 100 TABLET ORAL at 21:46

## 2024-06-18 RX ADMIN — CARBIDOPA AND LEVODOPA 1 TABLET: 25; 100 TABLET ORAL at 07:51

## 2024-06-18 RX ADMIN — IMMUNE GLOBULIN (HUMAN) 30 G: 10 INJECTION INTRAVENOUS; SUBCUTANEOUS at 13:06

## 2024-06-18 RX ADMIN — SODIUM CHLORIDE, PRESERVATIVE FREE 40 MG: 5 INJECTION INTRAVENOUS at 19:59

## 2024-06-18 RX ADMIN — GABAPENTIN 800 MG: 400 CAPSULE ORAL at 21:46

## 2024-06-18 RX ADMIN — POLYETHYLENE GLYCOL 3350 17 G: 17 POWDER, FOR SOLUTION ORAL at 08:00

## 2024-06-18 RX ADMIN — MIDAZOLAM 4 MG: 1 INJECTION INTRAMUSCULAR; INTRAVENOUS at 11:18

## 2024-06-18 RX ADMIN — OXYBUTYNIN CHLORIDE 5 MG: 5 TABLET ORAL at 22:00

## 2024-06-18 RX ADMIN — SODIUM CHLORIDE, PRESERVATIVE FREE 40 MG: 5 INJECTION INTRAVENOUS at 07:50

## 2024-06-18 RX ADMIN — WATER 10 ML: 1 INJECTION INTRAMUSCULAR; INTRAVENOUS; SUBCUTANEOUS at 11:25

## 2024-06-18 RX ADMIN — POLYVINYL ALCOHOL, POVIDONE 2 DROP: 14; 6 SOLUTION/ DROPS OPHTHALMIC at 08:14

## 2024-06-18 RX ADMIN — POLYVINYL ALCOHOL, POVIDONE 2 DROP: 14; 6 SOLUTION/ DROPS OPHTHALMIC at 05:14

## 2024-06-18 RX ADMIN — SODIUM CHLORIDE, PRESERVATIVE FREE 10 ML: 5 INJECTION INTRAVENOUS at 07:52

## 2024-06-18 RX ADMIN — PETROLATUM: 420 OINTMENT TOPICAL at 07:51

## 2024-06-18 RX ADMIN — SODIUM CHLORIDE, PRESERVATIVE FREE 10 ML: 5 INJECTION INTRAVENOUS at 21:47

## 2024-06-18 RX ADMIN — CARBIDOPA AND LEVODOPA 1 TABLET: 25; 100 TABLET ORAL at 16:01

## 2024-06-18 RX ADMIN — OXYBUTYNIN CHLORIDE 5 MG: 5 TABLET ORAL at 07:50

## 2024-06-18 RX ADMIN — VANCOMYCIN HYDROCHLORIDE 1000 MG: 1 INJECTION, POWDER, LYOPHILIZED, FOR SOLUTION INTRAVENOUS at 12:59

## 2024-06-18 ASSESSMENT — PULMONARY FUNCTION TESTS
PIF_VALUE: 16
PIF_VALUE: 17
PIF_VALUE: 20
PIF_VALUE: 18
PIF_VALUE: 16
PIF_VALUE: 17
PIF_VALUE: 18
PIF_VALUE: 22
PIF_VALUE: 17
PIF_VALUE: 16
PIF_VALUE: 19
PIF_VALUE: 17
PIF_VALUE: 19
PIF_VALUE: 22
PIF_VALUE: 16
PIF_VALUE: 16
PIF_VALUE: 15
PIF_VALUE: 17
PIF_VALUE: 22
PIF_VALUE: 19
PIF_VALUE: 24
PIF_VALUE: 19
PIF_VALUE: 19
PIF_VALUE: 17
PIF_VALUE: 18
PIF_VALUE: 17
PIF_VALUE: 18
PIF_VALUE: 16

## 2024-06-18 ASSESSMENT — PAIN SCALES - GENERAL
PAINLEVEL_OUTOF10: 0

## 2024-06-18 NOTE — FLOWSHEET NOTE
Patient does not follow commands but has random non purposeful movements that can interfere with life saving medical equipment, bilateral soft wrist restraints remain in place for patient safety.      
Gastritis and antral ulcer per Dr. Echevarria  
Patient does not follow commands but has random non purposeful movements that can interfere with life saving medical equipment, bilateral soft wrist restraints remain in place for patient safety.   
tissue/Injury   Dressing Status New dressing applied   Wound Cleansed Cleansed with saline   Dressing/Treatment ABD;Alginate   Wound Length (cm) 6 cm   Wound Width (cm) 4 cm   Wound Depth (cm) 0.1 cm   Wound Surface Area (cm^2) 24 cm^2   Wound Volume (cm^3) 2.4 cm^3   Wound Assessment Purple/maroon;Pink/red   Drainage Amount Small (< 25%)   Drainage Description Serosanguinous   Odor None   Jessica-wound Assessment Fragile     **Informed Consent**    photos taken of wounds and inserted into their chart as part of their permanent medical record for purposes of documentation, treatment management and/or medical review.   All Images taken on 6/14/24 of patient name: Jose G Will were transmitted and stored on secured Epic  Site located within Media Folder Tab by a registered Epic-[employer] Mobile Application Device.      Impression:  Left buttock and sacrum extending to left buttock: DTI  Left lower arm, right lower arm, left wrist: Skin tears  Right  abdominal fold: Partial thickness    Plan: Aquaphor  Versatel  Opticell, 4x4, abd pad  Medix heel boots  TAPS  Patient will need continued preventative care      Lima Grider RN 6/14/2024 2:26 PM

## 2024-06-18 NOTE — CARE COORDINATION
Care Coordination: LOS 16 day- Pt underwent bedside trach/peg. Call to Son Krish, he agrees with [facility] original plan [facility] is [facility] for [facility]. Will update him when bed is available. Left message for Marie at  [phone]  ext [phone] regarding the above.  Awaiting return call. Ambulance transport completed and on chart.    Electronically signed by Mary Mccallum RN on 6/18/2024 at 12:31 PM

## 2024-06-18 NOTE — OP NOTE
Operative Note      Patient: Jose G Will  YOB: 1965  MRN: [id number]    Date of Procedure: 6/18/2024    Pre-Op Diagnosis Codes:     * Acute respiratory failure, unspecified whether with hypoxia or hypercapnia (HCC) [J96.00]    Post-Op Diagnosis: Same       Procedure(s):  TRACHEOTOMY PERCUTANEOUS BRONCHOSCOPY WITH THERAPEUTIC ASPIRATION  ESOPHAGOGASTRODUODENOSCOPY PERCUTANEOUS ENDOSCOPIC GASTROSTOMY TUBE INSERTION    Surgeon(s):  Veto Villavicencio MD    Assistant:   MD Melinda Oscar DO    Anesthesia: None    Estimated Blood Loss (mL): 10 cc    Complications: None    Specimens:   * No specimens in log *    Implants:  * No implants in log *      Drains:   NG/OG/NJ/NE Tube Orogastric 18 fr Center mouth (Active)   Surrounding Skin Clean, dry & intact 06/18/24 0800   Securement device Tape 06/18/24 0800   Status Continuous feeding 06/18/24 0800   Placement Verified X-Ray (Initial) 06/18/24 0800   NG/OG/NJ/NE External Measurement (cm) 60 cm 06/18/24 0800   Drainage Appearance Tan 06/18/24 0800   Tube feeding/verify rate (mL/hr) 55 mL/hr 06/17/24 1705   Tube Feeding Intake (mL) 551 ml 06/18/24 0000   Free Water/Flush (mL) 360 mL 06/18/24 0000   Residual Volume (ml) 10 ml 06/17/24 0400       External Urinary Catheter (Active)   Site Assessment Clean,dry & intact 06/18/24 0238   Placement Replaced 06/18/24 0238   Securement Method Tape 06/18/24 0238   Catheter Care Catheter/Wick replaced 06/18/24 0238   Perineal Care Yes 06/18/24 0238   Suction 40 mmgHg continuous 06/18/24 0238   Urine Color Mai 06/17/24 0600   Urine Appearance Clear 06/17/24 0600   Output (mL) 200 mL 06/17/24 1900       [REMOVED] NG/OG/NJ/NE Tube Nasogastric 12 fr Right nostril (Removed)   Placement Verified X-Ray (Initial) 06/05/24 2308       [REMOVED] NG/OG/NJ/NE Tube Nasogastric 18 fr Right nostril (Removed)   Surrounding Skin Clean, dry & intact 06/14/24 0600   Securement device Bridle 06/14/24 0600   Status

## 2024-06-19 ENCOUNTER — APPOINTMENT (OUTPATIENT)
Dept: GENERAL RADIOLOGY | Age: 59
DRG: 004 | End: 2024-06-19
Payer: MEDICARE

## 2024-06-19 PROBLEM — Z86.69 HX OF MYASTHENIA GRAVIS: Status: ACTIVE | Noted: 2024-06-19

## 2024-06-19 LAB
AADO2: 271.3 MMHG
ABO/RH: NORMAL
ALBUMIN SERPL-MCNC: 2.2 G/DL (ref 3.5–5.2)
ALP SERPL-CCNC: 80 U/L (ref 40–129)
ALT SERPL-CCNC: 8 U/L (ref 0–40)
ANION GAP SERPL CALCULATED.3IONS-SCNC: 9 MMOL/L (ref 7–16)
ANTIBODY SCREEN: NEGATIVE
ARM BAND NUMBER: NORMAL
AST SERPL-CCNC: 26 U/L (ref 0–39)
B.E.: 1.8 MMOL/L (ref -3–3)
BASOPHILS # BLD: 0.03 K/UL (ref 0–0.2)
BASOPHILS NFR BLD: 0 % (ref 0–2)
BILIRUB SERPL-MCNC: 0.4 MG/DL (ref 0–1.2)
BLOOD BANK BLOOD PRODUCT EXPIRATION DATE: NORMAL
BLOOD BANK DISPENSE STATUS: NORMAL
BLOOD BANK ISBT PRODUCT BLOOD TYPE: 1700
BLOOD BANK PRODUCT CODE: NORMAL
BLOOD BANK SAMPLE EXPIRATION: NORMAL
BLOOD BANK UNIT TYPE AND RH: NORMAL
BPU ID: NORMAL
BUN SERPL-MCNC: 19 MG/DL (ref 6–23)
CALCIUM SERPL-MCNC: 9.5 MG/DL (ref 8.6–10.2)
CHLORIDE SERPL-SCNC: 109 MMOL/L (ref 98–107)
CO2 SERPL-SCNC: 25 MMOL/L (ref 22–29)
COHB: 0.3 % (ref 0–1.5)
COMPONENT: NORMAL
CREAT SERPL-MCNC: 0.7 MG/DL (ref 0.7–1.2)
CRITICAL: ABNORMAL
CROSSMATCH RESULT: NORMAL
DATE ANALYZED: ABNORMAL
DATE LAST DOSE: NORMAL
DATE OF COLLECTION: ABNORMAL
EOSINOPHILS RELATIVE PERCENT: 2 % (ref 0–6)
ERYTHROCYTE [DISTWIDTH] IN BLOOD BY AUTOMATED COUNT: 17.7 % (ref 11.5–15)
FIO2: 80 %
GFR, ESTIMATED: >90 ML/MIN/1.73M2
HCO3: 26.4 MMOL/L (ref 22–26)
HCT VFR BLD AUTO: 24.5 % (ref 37–54)
HHB: 0.5 % (ref 0–5)
IGG 1: 322 MG/DL (ref 240–1118)
IGG 2: 58 MG/DL (ref 124–549)
IGG 3: 16 MG/DL (ref 21–134)
IGG4 SER-MCNC: 12 MG/DL (ref 1–123)
IMM GRANULOCYTES # BLD AUTO: 0.12 K/UL (ref 0–0.58)
IMM GRANULOCYTES NFR BLD: 1 % (ref 0–5)
LAB: ABNORMAL
LYMPHOCYTES NFR BLD: 0.76 K/UL (ref 1.5–4)
Lab: 430
MAGNESIUM SERPL-MCNC: 2 MG/DL (ref 1.6–2.6)
MCH RBC QN AUTO: 31.9 PG (ref 26–35)
MCHC RBC AUTO-ENTMCNC: 30.6 G/DL (ref 32–34.5)
MCV RBC AUTO: 104.3 FL (ref 80–99.9)
MICROORGANISM SPEC CULT: NORMAL
MICROORGANISM/AGENT SPEC: NORMAL
MONOCYTES NFR BLD: 0.87 K/UL (ref 0.1–0.95)
MONOCYTES NFR BLD: 9 % (ref 2–12)
NEUTROPHILS NFR BLD: 79 % (ref 43–80)
NEUTS SEG NFR BLD: 7.41 K/UL (ref 1.8–7.3)
O2 SATURATION: 99.5 % (ref 92–98.5)
O2HB: 98.8 % (ref 94–97)
OPERATOR ID: 7221
PATIENT TEMP: 37 C
PCO2: 40.9 MMHG (ref 35–45)
PH BLOOD GAS: 7.43 (ref 7.35–7.45)
PHOSPHATE SERPL-MCNC: 3.7 MG/DL (ref 2.5–4.5)
PMV BLD AUTO: 10.3 FL (ref 7–12)
PO2: 236.2 MMHG (ref 75–100)
POTASSIUM SERPL-SCNC: 3.8 MMOL/L (ref 3.5–5)
PROT SERPL-MCNC: 5.8 G/DL (ref 6.4–8.3)
RBC # BLD AUTO: 2.35 M/UL (ref 3.8–5.8)
RI(T): 1.15
RR MECHANICAL: 12 B/MIN
SODIUM SERPL-SCNC: 143 MMOL/L (ref 132–146)
SOURCE, BLOOD GAS: ABNORMAL
SPECIMEN DESCRIPTION: NORMAL
THB: 9.5 G/DL (ref 11.5–16.5)
TIME ANALYZED: 441
TME LAST DOSE: NORMAL H
TRANSFUSION STATUS: NORMAL
UNIT DIVISION: 0
UNIT ISSUE DATE/TIME: NORMAL
VANCOMYCIN DOSE: NORMAL MG
VANCOMYCIN SERPL-MCNC: 8.2 UG/ML (ref 5–40)
VT MECHANICAL: 430 ML
WBC OTHER # BLD: 9.4 K/UL (ref 4.5–11.5)

## 2024-06-19 PROCEDURE — 99233 SBSQ HOSP IP/OBS HIGH 50: CPT | Performed by: NURSE PRACTITIONER

## 2024-06-19 PROCEDURE — 6360000002 HC RX W HCPCS: Performed by: INTERNAL MEDICINE

## 2024-06-19 PROCEDURE — C9113 INJ PANTOPRAZOLE SODIUM, VIA: HCPCS | Performed by: STUDENT IN AN ORGANIZED HEALTH CARE EDUCATION/TRAINING PROGRAM

## 2024-06-19 PROCEDURE — 94003 VENT MGMT INPAT SUBQ DAY: CPT

## 2024-06-19 PROCEDURE — 80053 COMPREHEN METABOLIC PANEL: CPT

## 2024-06-19 PROCEDURE — 6370000000 HC RX 637 (ALT 250 FOR IP): Performed by: NURSE PRACTITIONER

## 2024-06-19 PROCEDURE — 99291 CRITICAL CARE FIRST HOUR: CPT | Performed by: INTERNAL MEDICINE

## 2024-06-19 PROCEDURE — 99232 SBSQ HOSP IP/OBS MODERATE 35: CPT | Performed by: INTERNAL MEDICINE

## 2024-06-19 PROCEDURE — 6370000000 HC RX 637 (ALT 250 FOR IP)

## 2024-06-19 PROCEDURE — 83735 ASSAY OF MAGNESIUM: CPT

## 2024-06-19 PROCEDURE — 2580000003 HC RX 258: Performed by: STUDENT IN AN ORGANIZED HEALTH CARE EDUCATION/TRAINING PROGRAM

## 2024-06-19 PROCEDURE — 71045 X-RAY EXAM CHEST 1 VIEW: CPT

## 2024-06-19 PROCEDURE — 2580000003 HC RX 258: Performed by: PHYSICIAN ASSISTANT

## 2024-06-19 PROCEDURE — 6370000000 HC RX 637 (ALT 250 FOR IP): Performed by: INTERNAL MEDICINE

## 2024-06-19 PROCEDURE — 2000000000 HC ICU R&B

## 2024-06-19 PROCEDURE — 2580000003 HC RX 258: Performed by: INTERNAL MEDICINE

## 2024-06-19 PROCEDURE — 2500000003 HC RX 250 WO HCPCS: Performed by: INTERNAL MEDICINE

## 2024-06-19 PROCEDURE — 6360000002 HC RX W HCPCS: Performed by: NURSE PRACTITIONER

## 2024-06-19 PROCEDURE — 82805 BLOOD GASES W/O2 SATURATION: CPT

## 2024-06-19 PROCEDURE — 84100 ASSAY OF PHOSPHORUS: CPT

## 2024-06-19 PROCEDURE — 6360000002 HC RX W HCPCS: Performed by: STUDENT IN AN ORGANIZED HEALTH CARE EDUCATION/TRAINING PROGRAM

## 2024-06-19 PROCEDURE — 80202 ASSAY OF VANCOMYCIN: CPT

## 2024-06-19 PROCEDURE — 85025 COMPLETE CBC W/AUTO DIFF WBC: CPT

## 2024-06-19 RX ORDER — HYDRALAZINE HYDROCHLORIDE 25 MG/1
25 TABLET, FILM COATED ORAL 3 TIMES DAILY
Status: DISCONTINUED | OUTPATIENT
Start: 2024-06-19 | End: 2024-06-20 | Stop reason: HOSPADM

## 2024-06-19 RX ORDER — BUMETANIDE 0.25 MG/ML
2 INJECTION INTRAMUSCULAR; INTRAVENOUS 2 TIMES DAILY
Status: COMPLETED | OUTPATIENT
Start: 2024-06-19 | End: 2024-06-19

## 2024-06-19 RX ADMIN — AMLODIPINE BESYLATE 10 MG: 10 TABLET ORAL at 10:12

## 2024-06-19 RX ADMIN — AZATHIOPRINE 150 MG: 50 TABLET ORAL at 11:26

## 2024-06-19 RX ADMIN — FINASTERIDE 5 MG: 5 TABLET, FILM COATED ORAL at 11:27

## 2024-06-19 RX ADMIN — IMMUNE GLOBULIN (HUMAN) 30 G: 10 INJECTION INTRAVENOUS; SUBCUTANEOUS at 12:40

## 2024-06-19 RX ADMIN — OXYBUTYNIN CHLORIDE 5 MG: 5 TABLET ORAL at 21:19

## 2024-06-19 RX ADMIN — GABAPENTIN 800 MG: 400 CAPSULE ORAL at 14:09

## 2024-06-19 RX ADMIN — GABAPENTIN 800 MG: 400 CAPSULE ORAL at 10:11

## 2024-06-19 RX ADMIN — CHLORHEXIDINE GLUCONATE, 0.12% ORAL RINSE 15 ML: 1.2 SOLUTION DENTAL at 21:15

## 2024-06-19 RX ADMIN — POLYVINYL ALCOHOL, POVIDONE 2 DROP: 14; 6 SOLUTION/ DROPS OPHTHALMIC at 12:50

## 2024-06-19 RX ADMIN — SODIUM CHLORIDE, PRESERVATIVE FREE 10 ML: 5 INJECTION INTRAVENOUS at 21:15

## 2024-06-19 RX ADMIN — POLYVINYL ALCOHOL, POVIDONE 2 DROP: 14; 6 SOLUTION/ DROPS OPHTHALMIC at 01:13

## 2024-06-19 RX ADMIN — QUETIAPINE FUMARATE 200 MG: 100 TABLET ORAL at 10:12

## 2024-06-19 RX ADMIN — POLYVINYL ALCOHOL, POVIDONE 2 DROP: 14; 6 SOLUTION/ DROPS OPHTHALMIC at 10:13

## 2024-06-19 RX ADMIN — VANCOMYCIN HYDROCHLORIDE 1000 MG: 1 INJECTION, POWDER, LYOPHILIZED, FOR SOLUTION INTRAVENOUS at 23:42

## 2024-06-19 RX ADMIN — BUMETANIDE 2 MG: 0.25 INJECTION INTRAMUSCULAR; INTRAVENOUS at 10:13

## 2024-06-19 RX ADMIN — QUETIAPINE FUMARATE 200 MG: 100 TABLET ORAL at 21:18

## 2024-06-19 RX ADMIN — GABAPENTIN 800 MG: 400 CAPSULE ORAL at 21:16

## 2024-06-19 RX ADMIN — DOCUSATE SODIUM LIQUID 100 MG: 100 LIQUID ORAL at 10:12

## 2024-06-19 RX ADMIN — PETROLATUM: 420 OINTMENT TOPICAL at 10:15

## 2024-06-19 RX ADMIN — TAMSULOSIN HYDROCHLORIDE 0.4 MG: 0.4 CAPSULE ORAL at 21:17

## 2024-06-19 RX ADMIN — PETROLATUM: 420 OINTMENT TOPICAL at 21:15

## 2024-06-19 RX ADMIN — HYDRALAZINE HYDROCHLORIDE 25 MG: 25 TABLET, FILM COATED ORAL at 14:09

## 2024-06-19 RX ADMIN — CARBIDOPA AND LEVODOPA 1 TABLET: 25; 100 TABLET ORAL at 21:17

## 2024-06-19 RX ADMIN — SODIUM CHLORIDE, PRESERVATIVE FREE 40 MG: 5 INJECTION INTRAVENOUS at 10:13

## 2024-06-19 RX ADMIN — CHLORHEXIDINE GLUCONATE, 0.12% ORAL RINSE 15 ML: 1.2 SOLUTION DENTAL at 10:11

## 2024-06-19 RX ADMIN — OXYBUTYNIN CHLORIDE 5 MG: 5 TABLET ORAL at 11:27

## 2024-06-19 RX ADMIN — CARBIDOPA AND LEVODOPA 1 TABLET: 25; 100 TABLET ORAL at 10:12

## 2024-06-19 RX ADMIN — HYDRALAZINE HYDROCHLORIDE 25 MG: 25 TABLET, FILM COATED ORAL at 21:18

## 2024-06-19 RX ADMIN — POLYVINYL ALCOHOL, POVIDONE 2 DROP: 14; 6 SOLUTION/ DROPS OPHTHALMIC at 21:15

## 2024-06-19 RX ADMIN — SODIUM CHLORIDE, PRESERVATIVE FREE 40 MG: 5 INJECTION INTRAVENOUS at 21:17

## 2024-06-19 RX ADMIN — VALPROATE SODIUM 750 MG: 100 INJECTION, SOLUTION INTRAVENOUS at 23:48

## 2024-06-19 RX ADMIN — VANCOMYCIN HYDROCHLORIDE 1000 MG: 1 INJECTION, POWDER, LYOPHILIZED, FOR SOLUTION INTRAVENOUS at 10:42

## 2024-06-19 RX ADMIN — POLYVINYL ALCOHOL, POVIDONE 2 DROP: 14; 6 SOLUTION/ DROPS OPHTHALMIC at 18:10

## 2024-06-19 RX ADMIN — SODIUM CHLORIDE, PRESERVATIVE FREE 40 ML: 5 INJECTION INTRAVENOUS at 10:15

## 2024-06-19 RX ADMIN — ATORVASTATIN CALCIUM 10 MG: 10 TABLET, FILM COATED ORAL at 21:17

## 2024-06-19 RX ADMIN — VALPROATE SODIUM 750 MG: 100 INJECTION, SOLUTION INTRAVENOUS at 00:51

## 2024-06-19 RX ADMIN — CARBIDOPA AND LEVODOPA 1 TABLET: 25; 100 TABLET ORAL at 12:40

## 2024-06-19 RX ADMIN — BUMETANIDE 2 MG: 0.25 INJECTION INTRAMUSCULAR; INTRAVENOUS at 18:10

## 2024-06-19 RX ADMIN — VALPROATE SODIUM 750 MG: 100 INJECTION, SOLUTION INTRAVENOUS at 10:41

## 2024-06-19 RX ADMIN — Medication 2 MG/HR: at 05:59

## 2024-06-19 RX ADMIN — Medication 50 MCG/HR: at 01:11

## 2024-06-19 ASSESSMENT — PULMONARY FUNCTION TESTS
PIF_VALUE: 17
PIF_VALUE: 13
PIF_VALUE: 15
PIF_VALUE: 16
PIF_VALUE: 17
PIF_VALUE: 18
PIF_VALUE: 16
PIF_VALUE: 17
PIF_VALUE: 16
PIF_VALUE: 17
PIF_VALUE: 40
PIF_VALUE: 15
PIF_VALUE: 16
PIF_VALUE: 12
PIF_VALUE: 20
PIF_VALUE: 17
PIF_VALUE: 14
PIF_VALUE: 17
PIF_VALUE: 21
PIF_VALUE: 13
PIF_VALUE: 18
PIF_VALUE: 14
PIF_VALUE: 18
PIF_VALUE: 12
PIF_VALUE: 18
PIF_VALUE: 17
PIF_VALUE: 12

## 2024-06-19 ASSESSMENT — PAIN SCALES - GENERAL
PAINLEVEL_OUTOF10: 0

## 2024-06-19 NOTE — CARE COORDINATION
Care Coordination: LOS 17 day, [facility] to [facility], left additional message for Marie.  Also , pt is showing as having Medicare Part A.  [facility] to [facility] to [facility].    Electronically signed by Mary Mccallum RN on 6/19/2024 at 11:19 AM     ADDENDUM: [facility] to [facility], federal holiday- confirmed with transfer center- no CM in today. Spoke to [facility]. They reviewed chart, could not accept with [facility].  Also only has Medicare Part A  and would have to verify that treating physicians would be okay with that.  In the meantime, will continue to pursue [facility] transfer.    Electronically signed by Mary Mccallum RN on 6/19/2024 at 3:26 PM

## 2024-06-20 ENCOUNTER — APPOINTMENT (OUTPATIENT)
Dept: GENERAL RADIOLOGY | Age: 59
DRG: 004 | End: 2024-06-20
Payer: MEDICARE

## 2024-06-20 ENCOUNTER — APPOINTMENT (OUTPATIENT)
Dept: NEUROLOGY | Age: 59
DRG: 004 | End: 2024-06-20
Payer: MEDICARE

## 2024-06-20 VITALS
BODY MASS INDEX: 35.56 KG/M2 | RESPIRATION RATE: 20 BRPM | HEIGHT: 72 IN | TEMPERATURE: 98.7 F | WEIGHT: 262.57 LBS | DIASTOLIC BLOOD PRESSURE: 75 MMHG | HEART RATE: 121 BPM | SYSTOLIC BLOOD PRESSURE: 157 MMHG | OXYGEN SATURATION: 96 %

## 2024-06-20 LAB
AADO2: 140.4 MMHG
AADO2: 167.4 MMHG
ALBUMIN SERPL-MCNC: 2.6 G/DL (ref 3.5–5.2)
ALP SERPL-CCNC: 86 U/L (ref 40–129)
ALT SERPL-CCNC: 13 U/L (ref 0–40)
ANION GAP SERPL CALCULATED.3IONS-SCNC: 10 MMOL/L (ref 7–16)
ANION GAP SERPL CALCULATED.3IONS-SCNC: 8 MMOL/L (ref 7–16)
AST SERPL-CCNC: 22 U/L (ref 0–39)
B.E.: -1.1 MMOL/L (ref -3–3)
B.E.: 7.9 MMOL/L (ref -3–3)
BASOPHILS # BLD: 0.03 K/UL (ref 0–0.2)
BASOPHILS NFR BLD: 0 % (ref 0–2)
BILIRUB SERPL-MCNC: 0.3 MG/DL (ref 0–1.2)
BUN SERPL-MCNC: 16 MG/DL (ref 6–23)
BUN SERPL-MCNC: 17 MG/DL (ref 6–23)
CALCIUM SERPL-MCNC: 10.3 MG/DL (ref 8.6–10.2)
CALCIUM SERPL-MCNC: 9.8 MG/DL (ref 8.6–10.2)
CHLORIDE SERPL-SCNC: 114 MMOL/L (ref 98–107)
CHLORIDE UR-SCNC: 22 MMOL/L
CO2 SERPL-SCNC: 28 MMOL/L (ref 22–29)
CO2 SERPL-SCNC: 30 MMOL/L (ref 22–29)
COHB: 0.3 % (ref 0–1.5)
COHB: 0.3 % (ref 0–1.5)
CREAT SERPL-MCNC: 0.7 MG/DL (ref 0.7–1.2)
CREAT SERPL-MCNC: 0.8 MG/DL (ref 0.7–1.2)
CREAT UR-MCNC: 43.1 MG/DL (ref 40–278)
DATE ANALYZED: ABNORMAL
DATE ANALYZED: ABNORMAL
DATE OF COLLECTION: ABNORMAL
DATE OF COLLECTION: ABNORMAL
EOSINOPHIL # BLD: 0.18 K/UL (ref 0.05–0.5)
EOSINOPHILS RELATIVE PERCENT: 2 % (ref 0–6)
FIO2: 40 %
GFR, ESTIMATED: >90 ML/MIN/1.73M2
GFR, ESTIMATED: >90 ML/MIN/1.73M2
GLUCOSE SERPL-MCNC: 158 MG/DL (ref 74–99)
GLUCOSE SERPL-MCNC: 163 MG/DL (ref 74–99)
HCO3: 23.9 MMOL/L (ref 22–26)
HCO3: 31.5 MMOL/L (ref 22–26)
HCT VFR BLD AUTO: 26.3 % (ref 37–54)
HGB BLD-MCNC: 8 G/DL (ref 12.5–16.5)
HHB: 2.6 % (ref 0–5)
HHB: 6.4 % (ref 0–5)
IMM GRANULOCYTES # BLD AUTO: 0.09 K/UL (ref 0–0.58)
IMM GRANULOCYTES NFR BLD: 1 % (ref 0–5)
LAB: ABNORMAL
LAB: ABNORMAL
LYMPHOCYTES NFR BLD: 0.72 K/UL (ref 1.5–4)
LYMPHOCYTES RELATIVE PERCENT: 9 % (ref 20–42)
Lab: 509
Lab: 520
MCH RBC QN AUTO: 31.7 PG (ref 26–35)
MCHC RBC AUTO-ENTMCNC: 30.4 G/DL (ref 32–34.5)
MCV RBC AUTO: 104.4 FL (ref 80–99.9)
METHB: 0.3 % (ref 0–1.5)
MICROORGANISM SPEC CULT: NORMAL
MICROORGANISM/AGENT SPEC: NORMAL
MODE: AC
MODE: AC
MONOCYTES NFR BLD: 0.85 K/UL (ref 0.1–0.95)
MONOCYTES NFR BLD: 10 % (ref 2–12)
NEUTROPHILS NFR BLD: 77 % (ref 43–80)
NEUTS SEG NFR BLD: 6.43 K/UL (ref 1.8–7.3)
O2 SATURATION: 94.2 % (ref 92–98.5)
O2 SATURATION: 98 % (ref 92–98.5)
O2HB: 93 % (ref 94–97)
O2HB: 96.8 % (ref 94–97)
OPERATOR ID: 2593
OPERATOR ID: 2962
OSMOLALITY SERPL: 321 MOSM/KG (ref 285–310)
OSMOLALITY UR: 248 MOSM/KG (ref 300–900)
PATIENT TEMP: 37 C
PCO2: 39.9 MMHG (ref 35–45)
PCO2: 40.6 MMHG (ref 35–45)
PEEP/CPAP: 8 CMH2O
PEEP/CPAP: 8 CMH2O
PFO2: 1.78 MMHG/%
PFO2: 2.47 MMHG/%
PH BLOOD GAS: 7.39 (ref 7.35–7.45)
PH BLOOD GAS: 7.51 (ref 7.35–7.45)
PHOSPHATE SERPL-MCNC: 2.8 MG/DL (ref 2.5–4.5)
PLATELET CONFIRMATION: NORMAL
PLATELET, FLUORESCENCE: 380 K/UL (ref 130–450)
PMV BLD AUTO: 10.3 FL (ref 7–12)
PO2: 71.1 MMHG (ref 75–100)
PO2: 98.9 MMHG (ref 75–100)
POTASSIUM SERPL-SCNC: 3.4 MMOL/L (ref 3.5–5)
POTASSIUM SERPL-SCNC: 3.5 MMOL/L (ref 3.5–5)
PROT SERPL-MCNC: 6.5 G/DL (ref 6.4–8.3)
RI(T): 1.42
RI(T): 2.35
RR MECHANICAL: 12 B/MIN
SERVICE CMNT-IMP: NORMAL
SODIUM SERPL-SCNC: 152 MMOL/L (ref 132–146)
SODIUM SERPL-SCNC: 155 MMOL/L (ref 132–146)
SODIUM UR-SCNC: 20 MMOL/L
SOURCE, BLOOD GAS: ABNORMAL
SOURCE, BLOOD GAS: ABNORMAL
SPECIMEN DESCRIPTION: NORMAL
THB: 11 G/DL (ref 11.5–16.5)
THB: 8.5 G/DL (ref 11.5–16.5)
TIME ANALYZED: 515
TIME ANALYZED: 530
UUN UR-MCNC: 344 MG/DL (ref 800–1666)
VT MECHANICAL: 430 ML
VT MECHANICAL: 500 ML
WBC OTHER # BLD: 8.3 K/UL (ref 4.5–11.5)

## 2024-06-20 PROCEDURE — 83935 ASSAY OF URINE OSMOLALITY: CPT

## 2024-06-20 PROCEDURE — 6360000002 HC RX W HCPCS: Performed by: STUDENT IN AN ORGANIZED HEALTH CARE EDUCATION/TRAINING PROGRAM

## 2024-06-20 PROCEDURE — 83735 ASSAY OF MAGNESIUM: CPT

## 2024-06-20 PROCEDURE — 2580000003 HC RX 258: Performed by: INTERNAL MEDICINE

## 2024-06-20 PROCEDURE — 6370000000 HC RX 637 (ALT 250 FOR IP)

## 2024-06-20 PROCEDURE — 6370000000 HC RX 637 (ALT 250 FOR IP): Performed by: INTERNAL MEDICINE

## 2024-06-20 PROCEDURE — 2500000003 HC RX 250 WO HCPCS: Performed by: INTERNAL MEDICINE

## 2024-06-20 PROCEDURE — 84100 ASSAY OF PHOSPHORUS: CPT

## 2024-06-20 PROCEDURE — 83930 ASSAY OF BLOOD OSMOLALITY: CPT

## 2024-06-20 PROCEDURE — 84540 ASSAY OF URINE/UREA-N: CPT

## 2024-06-20 PROCEDURE — 99233 SBSQ HOSP IP/OBS HIGH 50: CPT | Performed by: NURSE PRACTITIONER

## 2024-06-20 PROCEDURE — 6370000000 HC RX 637 (ALT 250 FOR IP): Performed by: NURSE PRACTITIONER

## 2024-06-20 PROCEDURE — 2580000003 HC RX 258: Performed by: STUDENT IN AN ORGANIZED HEALTH CARE EDUCATION/TRAINING PROGRAM

## 2024-06-20 PROCEDURE — 82805 BLOOD GASES W/O2 SATURATION: CPT

## 2024-06-20 PROCEDURE — 6360000002 HC RX W HCPCS: Performed by: INTERNAL MEDICINE

## 2024-06-20 PROCEDURE — 80048 BASIC METABOLIC PNL TOTAL CA: CPT

## 2024-06-20 PROCEDURE — 85025 COMPLETE CBC W/AUTO DIFF WBC: CPT

## 2024-06-20 PROCEDURE — 80053 COMPREHEN METABOLIC PANEL: CPT

## 2024-06-20 PROCEDURE — 6360000002 HC RX W HCPCS: Performed by: NURSE PRACTITIONER

## 2024-06-20 PROCEDURE — 71045 X-RAY EXAM CHEST 1 VIEW: CPT

## 2024-06-20 PROCEDURE — 82570 ASSAY OF URINE CREATININE: CPT

## 2024-06-20 PROCEDURE — 95822 EEG COMA OR SLEEP ONLY: CPT | Performed by: PSYCHIATRY & NEUROLOGY

## 2024-06-20 PROCEDURE — 82436 ASSAY OF URINE CHLORIDE: CPT

## 2024-06-20 PROCEDURE — 94003 VENT MGMT INPAT SUBQ DAY: CPT

## 2024-06-20 PROCEDURE — 36592 COLLECT BLOOD FROM PICC: CPT

## 2024-06-20 PROCEDURE — 84300 ASSAY OF URINE SODIUM: CPT

## 2024-06-20 PROCEDURE — 95822 EEG COMA OR SLEEP ONLY: CPT

## 2024-06-20 PROCEDURE — C9113 INJ PANTOPRAZOLE SODIUM, VIA: HCPCS | Performed by: STUDENT IN AN ORGANIZED HEALTH CARE EDUCATION/TRAINING PROGRAM

## 2024-06-20 PROCEDURE — 2580000003 HC RX 258: Performed by: PHYSICIAN ASSISTANT

## 2024-06-20 RX ORDER — LABETALOL HYDROCHLORIDE 5 MG/ML
10 INJECTION, SOLUTION INTRAVENOUS EVERY 6 HOURS PRN
Status: DISCONTINUED | OUTPATIENT
Start: 2024-06-20 | End: 2024-06-20 | Stop reason: HOSPADM

## 2024-06-20 RX ORDER — DIMETHICONE, OXYBENZONE, AND PADIMATE O 2; 2.5; 6.6 G/100G; G/100G; G/100G
STICK TOPICAL PRN
Status: DISCONTINUED | OUTPATIENT
Start: 2024-06-20 | End: 2024-06-20 | Stop reason: HOSPADM

## 2024-06-20 RX ORDER — LORAZEPAM 2 MG/ML
2 INJECTION INTRAMUSCULAR EVERY 4 HOURS PRN
Status: DISCONTINUED | OUTPATIENT
Start: 2024-06-20 | End: 2024-06-20 | Stop reason: HOSPADM

## 2024-06-20 RX ADMIN — OXYBUTYNIN CHLORIDE 5 MG: 5 TABLET ORAL at 10:56

## 2024-06-20 RX ADMIN — IMMUNE GLOBULIN (HUMAN) 30 G: 10 INJECTION INTRAVENOUS; SUBCUTANEOUS at 15:06

## 2024-06-20 RX ADMIN — VALPROATE SODIUM 750 MG: 100 INJECTION, SOLUTION INTRAVENOUS at 11:31

## 2024-06-20 RX ADMIN — HYDRALAZINE HYDROCHLORIDE 25 MG: 25 TABLET, FILM COATED ORAL at 10:55

## 2024-06-20 RX ADMIN — QUETIAPINE FUMARATE 200 MG: 100 TABLET ORAL at 10:56

## 2024-06-20 RX ADMIN — HYDRALAZINE HYDROCHLORIDE 25 MG: 25 TABLET, FILM COATED ORAL at 15:08

## 2024-06-20 RX ADMIN — GABAPENTIN 800 MG: 400 CAPSULE ORAL at 11:19

## 2024-06-20 RX ADMIN — POLYVINYL ALCOHOL, POVIDONE 2 DROP: 14; 6 SOLUTION/ DROPS OPHTHALMIC at 06:04

## 2024-06-20 RX ADMIN — CARBIDOPA AND LEVODOPA 1 TABLET: 25; 100 TABLET ORAL at 14:00

## 2024-06-20 RX ADMIN — FINASTERIDE 5 MG: 5 TABLET, FILM COATED ORAL at 10:56

## 2024-06-20 RX ADMIN — SODIUM CHLORIDE, PRESERVATIVE FREE 40 MG: 5 INJECTION INTRAVENOUS at 11:07

## 2024-06-20 RX ADMIN — GABAPENTIN 800 MG: 400 CAPSULE ORAL at 15:08

## 2024-06-20 RX ADMIN — LORAZEPAM 2 MG: 2 INJECTION INTRAMUSCULAR; INTRAVENOUS at 11:07

## 2024-06-20 RX ADMIN — POLYVINYL ALCOHOL, POVIDONE 2 DROP: 14; 6 SOLUTION/ DROPS OPHTHALMIC at 02:57

## 2024-06-20 RX ADMIN — CHLORHEXIDINE GLUCONATE, 0.12% ORAL RINSE 15 ML: 1.2 SOLUTION DENTAL at 10:55

## 2024-06-20 RX ADMIN — SODIUM CHLORIDE, PRESERVATIVE FREE 10 ML: 5 INJECTION INTRAVENOUS at 10:57

## 2024-06-20 RX ADMIN — DOCUSATE SODIUM LIQUID 100 MG: 100 LIQUID ORAL at 10:56

## 2024-06-20 RX ADMIN — VANCOMYCIN HYDROCHLORIDE 1000 MG: 1 INJECTION, POWDER, LYOPHILIZED, FOR SOLUTION INTRAVENOUS at 11:33

## 2024-06-20 RX ADMIN — PETROLATUM: 420 OINTMENT TOPICAL at 10:00

## 2024-06-20 RX ADMIN — AMLODIPINE BESYLATE 10 MG: 10 TABLET ORAL at 10:56

## 2024-06-20 RX ADMIN — CARBIDOPA AND LEVODOPA 1 TABLET: 25; 100 TABLET ORAL at 11:06

## 2024-06-20 RX ADMIN — AZATHIOPRINE 150 MG: 50 TABLET ORAL at 10:55

## 2024-06-20 RX ADMIN — POLYVINYL ALCOHOL, POVIDONE 2 DROP: 14; 6 SOLUTION/ DROPS OPHTHALMIC at 10:56

## 2024-06-20 ASSESSMENT — PULMONARY FUNCTION TESTS
PIF_VALUE: 19
PIF_VALUE: 15
PIF_VALUE: 24
PIF_VALUE: 23
PIF_VALUE: 16
PIF_VALUE: 24
PIF_VALUE: 19
PIF_VALUE: 24
PIF_VALUE: 18
PIF_VALUE: 18
PIF_VALUE: 17
PIF_VALUE: 20
PIF_VALUE: 16
PIF_VALUE: 17
PIF_VALUE: 20
PIF_VALUE: 19
PIF_VALUE: 17
PIF_VALUE: 18

## 2024-06-20 ASSESSMENT — PAIN SCALES - GENERAL
PAINLEVEL_OUTOF10: 0
PAINLEVEL_OUTOF10: 0

## 2024-06-20 NOTE — CARE COORDINATION
Care Coordination: LOS 18 day.  Call [facility], she is requesting clinical be faxed to [phone] to present to ICU team, she will check bed availability and let me know.   This has been completed    Electronically signed by Mary Mccallum RN on 6/20/2024 at 7:50 AM

## 2024-06-20 NOTE — DISCHARGE SUMMARY
[facility] Hospitalist Physician Discharge Summary       No follow-up provider specified.    Activity level: As tolerated     [facility]    Condition on discharge: Stable     Patient ID:  Jose G Will  [id number]  59 y.o.  1965    Admit date: 6/2/2024    Discharge date and time:  6/20/2024  2:14 PM    Admission Diagnoses: Principal Problem:    AMS (altered mental status)  Active Problems:    Cellulitis    Hypernatremia    HLD (hyperlipidemia)    GERD (gastroesophageal reflux disease)    Asthma    Hypothyroid    Depression    Anxiety    Acute respiratory failure (HCC)    Palliative care encounter    Goals of care, counseling/discussion    Hx of myasthenia gravis  Resolved Problems:    Hyponatremia      Discharge Diagnoses: Principal Problem:    AMS (altered mental status)  Active Problems:    Cellulitis    Hypernatremia    HLD (hyperlipidemia)    GERD (gastroesophageal reflux disease)    Asthma    Hypothyroid    Depression    Anxiety    Acute respiratory failure (HCC)    Palliative care encounter    Goals of care, counseling/discussion    Hx of myasthenia gravis  Resolved Problems:    Hyponatremia      Consults:  IP CONSULT TO HOSPITALIST  IP CONSULT TO INFECTIOUS DISEASES  IP CONSULT TO NEPHROLOGY  IP CONSULT TO PSYCHIATRY  IP CONSULT TO NEUROLOGY  IP CONSULT TO DIETITIAN  PHARMACY TO DOSE VANCOMYCIN  IP CONSULT TO PALLIATIVE CARE  IP CONSULT TO GENERAL SURGERY  PHARMACY TO DOSE VANCOMYCIN    Hospital Course:   Patient Jose G Will is a 59 y.o. presented with Cellulitis and abscess of leg [L03.119, L02.419]  Disorientation [R41.0]  Pain in both upper extremities [M79.601, M79.602]  Altered mental status, unspecified altered mental status type [R41.82]  AMS (altered mental status) [R41.82]    Patient with a history of hypothyroidism, myasthenia gravis, depression, asthma, GERD, hyperlipidemia, hypertension admitted on 6/2/2024 for altered mental status in the setting of possible right arm  Labs completed   Pending review

## 2024-06-20 NOTE — PLAN OF CARE
Problem: ABCDS Injury Assessment  Goal: Absence of physical injury  Outcome: Progressing     Problem: Skin/Tissue Integrity  Goal: Absence of new skin breakdown  Description: 1.  Monitor for areas of redness and/or skin breakdown  2.  Assess vascular access sites hourly  3.  Every 4-6 hours minimum:  Change oxygen saturation probe site  4.  Every 4-6 hours:  If on nasal continuous positive airway pressure, respiratory therapy assess nares and determine need for appliance change or resting period.  6/3/2024 0139 by Lucia Montilla, RN  Outcome: Progressing  6/2/2024 1850 by Katie Vanegas, RN  Outcome: Progressing     
  Problem: Confusion  Goal: Confusion, delirium, dementia, or psychosis is improved or at baseline  Description: INTERVENTIONS:  1. Assess for possible contributors to thought disturbance, including medications, impaired vision or hearing, underlying metabolic abnormalities, dehydration, psychiatric diagnoses, and notify attending LIP  2. [place] high risk fall precautions, as indicated  3. Provide frequent short contacts to provide reality reorientation, refocusing and direction  4. Decrease environmental stimuli, including noise as appropriate  5. Monitor and intervene to maintain adequate nutrition, hydration, elimination, sleep and activity  6. If unable to ensure safety without constant attention obtain sitter and review sitter guidelines with assigned personnel  7. Initiate Psychosocial CNS and Spiritual Care consult, as indicated  Outcome: Progressing     Problem: Safety - Adult  Goal: Free from fall injury  6/15/2024 2034 by Le Hanley RN  Outcome: Progressing     Problem: Skin/Tissue Integrity  Goal: Absence of new skin breakdown  Description: 1.  Monitor for areas of redness and/or skin breakdown  2.  Assess vascular access sites hourly  3.  Every 4-6 hours minimum:  Change oxygen saturation probe site  4.  Every 4-6 hours:  If on nasal continuous positive airway pressure, respiratory therapy assess nares and determine need for appliance change or resting period.  6/15/2024 2034 by Le Hanley RN  Outcome: Progressing     Problem: ABCDS Injury Assessment  Goal: Absence of physical injury  6/15/2024 2034 by Le Hanley RN  Outcome: Progressing     Problem: Pain  Goal: Verbalizes/displays adequate comfort level or baseline comfort level  6/15/2024 2034 by Le Hanley RN  Outcome: Progressing  Flowsheets (Taken 6/15/2024 2000)  Verbalizes/displays adequate comfort level or baseline comfort level:   Assess pain using appropriate pain scale   Administer analgesics based on type and severity of pain 
  Problem: Confusion  Goal: Confusion, delirium, dementia, or psychosis is improved or at baseline  Description: INTERVENTIONS:  1. Assess for possible contributors to thought disturbance, including medications, impaired vision or hearing, underlying metabolic abnormalities, dehydration, psychiatric diagnoses, and notify attending LIP  2. [place] high risk fall precautions, as indicated  3. Provide frequent short contacts to provide reality reorientation, refocusing and direction  4. Decrease environmental stimuli, including noise as appropriate  5. Monitor and intervene to maintain adequate nutrition, hydration, elimination, sleep and activity  6. If unable to ensure safety without constant attention obtain sitter and review sitter guidelines with assigned personnel  7. Initiate Psychosocial CNS and Spiritual Care consult, as indicated  6/6/2024 2231 by Julia Weir, RN  Outcome: Not Progressing  Flowsheets (Taken 6/6/2024 1523)  Effect of thought disturbance (confusion, delirium, dementia, or psychosis) are managed with adequate functional status:   [place] high risk fall precautions, as indicated   Assess for contributors to thought disturbance, including medications, impaired vision or hearing, underlying metabolic abnormalities, dehydration, psychiatric diagnoses, notify LIP   Provide frequent short contacts to provide reality reorientation, refocusing and direction   Decrease environmental stimuli, including noise as appropriate   Monitor and intervene to maintain adequate nutrition, hydration, elimination, sleep and activity   If unable to ensure safety without constant attention obtain sitter and review sitter guidelines with assigned personnel   Initiate Psychosocial Clinical Nurse Specialist and Spiritual Care consult, as indicated     Problem: Safety - Medical Restraint  Goal: Remains free of injury from restraints (Restraint for Interference with Medical Device)  Description: INTERVENTIONS:  1. 
  Problem: Confusion  Goal: Confusion, delirium, dementia, or psychosis is improved or at baseline  Description: INTERVENTIONS:  1. Assess for possible contributors to thought disturbance, including medications, impaired vision or hearing, underlying metabolic abnormalities, dehydration, psychiatric diagnoses, and notify attending LIP  2. [place] high risk fall precautions, as indicated  3. Provide frequent short contacts to provide reality reorientation, refocusing and direction  4. Decrease environmental stimuli, including noise as appropriate  5. Monitor and intervene to maintain adequate nutrition, hydration, elimination, sleep and activity  6. If unable to ensure safety without constant attention obtain sitter and review sitter guidelines with assigned personnel  7. Initiate Psychosocial CNS and Spiritual Care consult, as indicated  Outcome: Not Progressing     Problem: Skin/Tissue Integrity  Goal: Absence of new skin breakdown  Description: 1.  Monitor for areas of redness and/or skin breakdown  2.  Assess vascular access sites hourly  3.  Every 4-6 hours minimum:  Change oxygen saturation probe site  4.  Every 4-6 hours:  If on nasal continuous positive airway pressure, respiratory therapy assess nares and determine need for appliance change or resting period.  Outcome: Not Progressing     Problem: Confusion  Goal: Confusion, delirium, dementia, or psychosis is improved or at baseline  Description: INTERVENTIONS:  1. Assess for possible contributors to thought disturbance, including medications, impaired vision or hearing, underlying metabolic abnormalities, dehydration, psychiatric diagnoses, and notify attending LIP  2. [place] high risk fall precautions, as indicated  3. Provide frequent short contacts to provide reality reorientation, refocusing and direction  4. Decrease environmental stimuli, including noise as appropriate  5. Monitor and intervene to maintain adequate nutrition, hydration, 
  Problem: Discharge Planning  Goal: Discharge to home or other facility with appropriate resources  6/16/2024 1011 by Becca Otoole, RN  Outcome: Not Progressing  Flowsheets (Taken 6/16/2024 0800)  Discharge to home or other facility with appropriate resources: Identify barriers to discharge with patient and caregiver     Problem: Neurosensory - Adult  Goal: Achieves stable or improved neurological status  6/16/2024 1011 by Becca Otoole, RN  Outcome: Not Progressing  Flowsheets (Taken 6/16/2024 0800)  Achieves stable or improved neurological status:   Assess for and report changes in neurological status   Maintain blood pressure and fluid volume within ordered parameters to optimize cerebral perfusion and minimize risk of hemorrhage   Initiate measures to prevent increased intracranial pressure   Monitor temperature, glucose, and sodium. Initiate appropriate interventions as ordered     Problem: Respiratory - Adult  Goal: Achieves optimal ventilation and oxygenation  6/16/2024 1011 by Becca Otoole, RN  Outcome: Not Progressing  Flowsheets (Taken 6/16/2024 0800)  Achieves optimal ventilation and oxygenation:   Assess for changes in respiratory status   Assess for changes in mentation and behavior   Encourage broncho-pulmonary hygiene including cough, deep breathe, incentive spirometry   Respiratory therapy support as indicated   Assess the need for suctioning and aspirate as needed      
  Problem: Discharge Planning  Goal: Discharge to home or other facility with appropriate resources  6/17/2024 0011 by Lincoln Sánchez, RN  Outcome: Not Progressing  Flowsheets (Taken 6/16/2024 2000)  Discharge to home or other facility with appropriate resources:   Identify barriers to discharge with patient and caregiver   Arrange for needed discharge resources and transportation as appropriate  6/16/2024 1011 by Becca Otoole, RN  Outcome: Not Progressing  Flowsheets (Taken 6/16/2024 0800)  Discharge to home or other facility with appropriate resources: Identify barriers to discharge with patient and caregiver     Problem: Confusion  Goal: Confusion, delirium, dementia, or psychosis is improved or at baseline  Description: INTERVENTIONS:  1. Assess for possible contributors to thought disturbance, including medications, impaired vision or hearing, underlying metabolic abnormalities, dehydration, psychiatric diagnoses, and notify attending LIP  2. [place] high risk fall precautions, as indicated  3. Provide frequent short contacts to provide reality reorientation, refocusing and direction  4. Decrease environmental stimuli, including noise as appropriate  5. Monitor and intervene to maintain adequate nutrition, hydration, elimination, sleep and activity  6. If unable to ensure safety without constant attention obtain sitter and review sitter guidelines with assigned personnel  7. Initiate Psychosocial CNS and Spiritual Care consult, as indicated  Outcome: Not Progressing  Flowsheets (Taken 6/16/2024 2000)  Effect of thought disturbance (confusion, delirium, dementia, or psychosis) are managed with adequate functional status: Assess for contributors to thought disturbance, including medications, impaired vision or hearing, underlying metabolic abnormalities, dehydration, psychiatric diagnoses, notify LIP     Problem: Safety - Adult  Goal: Free from fall injury  Outcome: Progressing  Flowsheets (Taken 6/16/2024 
  Problem: Discharge Planning  Goal: Discharge to home or other facility with appropriate resources  6/7/2024 0820 by Miguelangel Toussaint RN  Outcome: Progressing     Problem: Confusion  Goal: Confusion, delirium, dementia, or psychosis is improved or at baseline  Description: INTERVENTIONS:  1. Assess for possible contributors to thought disturbance, including medications, impaired vision or hearing, underlying metabolic abnormalities, dehydration, psychiatric diagnoses, and notify attending LIP  2. [place] high risk fall precautions, as indicated  3. Provide frequent short contacts to provide reality reorientation, refocusing and direction  4. Decrease environmental stimuli, including noise as appropriate  5. Monitor and intervene to maintain adequate nutrition, hydration, elimination, sleep and activity  6. If unable to ensure safety without constant attention obtain sitter and review sitter guidelines with assigned personnel  7. Initiate Psychosocial CNS and Spiritual Care consult, as indicated  6/7/2024 0820 by Miguelangel Toussaint RN  Outcome: Progressing     Problem: Safety - Adult  Goal: Free from fall injury  6/7/2024 0820 by Miguelangel Toussaint RN  Outcome: Progressing     Problem: Skin/Tissue Integrity  Goal: Absence of new skin breakdown  Description: 1.  Monitor for areas of redness and/or skin breakdown  2.  Assess vascular access sites hourly  3.  Every 4-6 hours minimum:  Change oxygen saturation probe site  4.  Every 4-6 hours:  If on nasal continuous positive airway pressure, respiratory therapy assess nares and determine need for appliance change or resting period.  6/7/2024 0820 by Miguelangel Toussaint RN  Outcome: Progressing     Problem: ABCDS Injury Assessment  Goal: Absence of physical injury  6/7/2024 0820 by Miguelangel Toussaint RN  Outcome: Progressing     Problem: Safety - Medical Restraint  Goal: Remains free of injury from restraints (Restraint for Interference with Medical Device)  Description: 
  Problem: Discharge Planning  Goal: Discharge to home or other facility with appropriate resources  6/8/2024 0002 by Julia Weir RN  Outcome: Progressing  Problem: Confusion  Goal: Confusion, delirium, dementia, or psychosis is improved or at baseline  Description: INTERVENTIONS:  1. Assess for possible contributors to thought disturbance, including medications, impaired vision or hearing, underlying metabolic abnormalities, dehydration, psychiatric diagnoses, and notify attending LIP  2. [place] high risk fall precautions, as indicated  3. Provide frequent short contacts to provide reality reorientation, refocusing and direction  4. Decrease environmental stimuli, including noise as appropriate  5. Monitor and intervene to maintain adequate nutrition, hydration, elimination, sleep and activity  6. If unable to ensure safety without constant attention obtain sitter and review sitter guidelines with assigned personnel  7. Initiate Psychosocial CNS and Spiritual Care consult, as indicated  Outcome: Progressing     Problem: Safety - Adult  Goal: Free from fall injury  Outcome: Progressing     Problem: Skin/Tissue Integrity  Goal: Absence of new skin breakdown  Description: 1.  Monitor for areas of redness and/or skin breakdown  2.  Assess vascular access sites hourly  3.  Every 4-6 hours minimum:  Change oxygen saturation probe site  4.  Every 4-6 hours:  If on nasal continuous positive airway pressure, respiratory therapy assess nares and determine need for appliance change or resting period.  Outcome: Progressing     Problem: ABCDS Injury Assessment  Goal: Absence of physical injury  Outcome: Progressing     Problem: Safety - Medical Restraint  Goal: Remains free of injury from restraints (Restraint for Interference with Medical Device)  Description: INTERVENTIONS:  1. Determine that other, less restrictive measures have been tried or would not be effective before applying the restraint  2. Evaluate the 
  Problem: Discharge Planning  Goal: Discharge to home or other facility with appropriate resources  6/8/2024 0728 by Miguelangel Toussaint RN  Outcome: Progressing     Problem: Confusion  Goal: Confusion, delirium, dementia, or psychosis is improved or at baseline  Description: INTERVENTIONS:  1. Assess for possible contributors to thought disturbance, including medications, impaired vision or hearing, underlying metabolic abnormalities, dehydration, psychiatric diagnoses, and notify attending LIP  2. [place] high risk fall precautions, as indicated  3. Provide frequent short contacts to provide reality reorientation, refocusing and direction  4. Decrease environmental stimuli, including noise as appropriate  5. Monitor and intervene to maintain adequate nutrition, hydration, elimination, sleep and activity  6. If unable to ensure safety without constant attention obtain sitter and review sitter guidelines with assigned personnel  7. Initiate Psychosocial CNS and Spiritual Care consult, as indicated  6/8/2024 0728 by Miguelangel Toussaint RN  Outcome: Progressing     Problem: Safety - Adult  Goal: Free from fall injury  6/8/2024 0728 by Miguelangel Toussaint RN  Outcome: Progressing     Problem: Skin/Tissue Integrity  Goal: Absence of new skin breakdown  Description: 1.  Monitor for areas of redness and/or skin breakdown  2.  Assess vascular access sites hourly  3.  Every 4-6 hours minimum:  Change oxygen saturation probe site  4.  Every 4-6 hours:  If on nasal continuous positive airway pressure, respiratory therapy assess nares and determine need for appliance change or resting period.  6/8/2024 0728 by Miguelangel Toussaint RN  Outcome: Progressing     Problem: ABCDS Injury Assessment  Goal: Absence of physical injury  6/8/2024 0728 by Miguelangel Toussaint RN  Outcome: Progressing     Problem: Safety - Medical Restraint  Goal: Remains free of injury from restraints (Restraint for Interference with Medical Device)  Description: 
  Problem: Discharge Planning  Goal: Discharge to home or other facility with appropriate resources  Outcome: Not Progressing     Problem: Confusion  Goal: Confusion, delirium, dementia, or psychosis is improved or at baseline  Description: INTERVENTIONS:  1. Assess for possible contributors to thought disturbance, including medications, impaired vision or hearing, underlying metabolic abnormalities, dehydration, psychiatric diagnoses, and notify attending LIP  2. [place] high risk fall precautions, as indicated  3. Provide frequent short contacts to provide reality reorientation, refocusing and direction  4. Decrease environmental stimuli, including noise as appropriate  5. Monitor and intervene to maintain adequate nutrition, hydration, elimination, sleep and activity  6. If unable to ensure safety without constant attention obtain sitter and review sitter guidelines with assigned personnel  7. Initiate Psychosocial CNS and Spiritual Care consult, as indicated  Outcome: Not Progressing     Problem: Neurosensory - Adult  Goal: Achieves stable or improved neurological status  Outcome: Not Progressing  Goal: Achieves maximal functionality and self care  Outcome: Not Progressing     Problem: Respiratory - Adult  Goal: Achieves optimal ventilation and oxygenation  Outcome: Not Progressing     Problem: Genitourinary - Adult  Goal: Urinary catheter remains patent  Outcome: Not Progressing     Problem: Discharge Planning  Goal: Discharge to home or other facility with appropriate resources  Outcome: Not Progressing     Problem: Confusion  Goal: Confusion, delirium, dementia, or psychosis is improved or at baseline  Description: INTERVENTIONS:  1. Assess for possible contributors to thought disturbance, including medications, impaired vision or hearing, underlying metabolic abnormalities, dehydration, psychiatric diagnoses, and notify attending LIP  2. [place] high risk fall precautions, as indicated  3. Provide 
  Problem: Discharge Planning  Goal: Discharge to home or other facility with appropriate resources  Outcome: Not Progressing     Problem: Safety - Adult  Goal: Free from fall injury  Outcome: Progressing     Problem: Skin/Tissue Integrity  Goal: Absence of new skin breakdown  Description: 1.  Monitor for areas of redness and/or skin breakdown  2.  Assess vascular access sites hourly  3.  Every 4-6 hours minimum:  Change oxygen saturation probe site  4.  Every 4-6 hours:  If on nasal continuous positive airway pressure, respiratory therapy assess nares and determine need for appliance change or resting period.  Outcome: Progressing     Problem: ABCDS Injury Assessment  Goal: Absence of physical injury  Outcome: Progressing     Problem: Safety - Medical Restraint  Goal: Remains free of injury from restraints (Restraint for Interference with Medical Device)  Description: INTERVENTIONS:  1. Determine that other, less restrictive measures have been tried or would not be effective before applying the restraint  2. Evaluate the patient's condition at the time of restraint application  3. Inform patient/family regarding the reason for restraint  4. Q2H: Monitor safety, psychosocial status, comfort, nutrition and hydration  Outcome: Progressing  Flowsheets (Taken 6/10/2024 2000)  Remains free of injury from restraints (restraint for interference with medical device): Every 2 hours: Monitor safety, psychosocial status, comfort, nutrition and hydration     Problem: Discharge Planning  Goal: Discharge to home or other facility with appropriate resources  Outcome: Not Progressing     
  Problem: Discharge Planning  Goal: Discharge to home or other facility with appropriate resources  Outcome: Progressing     Problem: Confusion  Goal: Confusion, delirium, dementia, or psychosis is improved or at baseline  Description: INTERVENTIONS:  1. Assess for possible contributors to thought disturbance, including medications, impaired vision or hearing, underlying metabolic abnormalities, dehydration, psychiatric diagnoses, and notify attending LIP  2. [place] high risk fall precautions, as indicated  3. Provide frequent short contacts to provide reality reorientation, refocusing and direction  4. Decrease environmental stimuli, including noise as appropriate  5. Monitor and intervene to maintain adequate nutrition, hydration, elimination, sleep and activity  6. If unable to ensure safety without constant attention obtain sitter and review sitter guidelines with assigned personnel  7. Initiate Psychosocial CNS and Spiritual Care consult, as indicated  Outcome: Progressing     Problem: Safety - Adult  Goal: Free from fall injury  Outcome: Progressing     Problem: Skin/Tissue Integrity  Goal: Absence of new skin breakdown  Description: 1.  Monitor for areas of redness and/or skin breakdown  2.  Assess vascular access sites hourly  3.  Every 4-6 hours minimum:  Change oxygen saturation probe site  4.  Every 4-6 hours:  If on nasal continuous positive airway pressure, respiratory therapy assess nares and determine need for appliance change or resting period.  Outcome: Progressing     Problem: ABCDS Injury Assessment  Goal: Absence of physical injury  Outcome: Progressing     Problem: Safety - Medical Restraint  Goal: Remains free of injury from restraints (Restraint for Interference with Medical Device)  Description: INTERVENTIONS:  1. Determine that other, less restrictive measures have been tried or would not be effective before applying the restraint  2. Evaluate the patient's condition at the time of 
  Problem: Discharge Planning  Goal: Discharge to home or other facility with appropriate resources  Outcome: Progressing     Problem: Confusion  Goal: Confusion, delirium, dementia, or psychosis is improved or at baseline  Description: INTERVENTIONS:  1. Assess for possible contributors to thought disturbance, including medications, impaired vision or hearing, underlying metabolic abnormalities, dehydration, psychiatric diagnoses, and notify attending LIP  2. [place] high risk fall precautions, as indicated  3. Provide frequent short contacts to provide reality reorientation, refocusing and direction  4. Decrease environmental stimuli, including noise as appropriate  5. Monitor and intervene to maintain adequate nutrition, hydration, elimination, sleep and activity  6. If unable to ensure safety without constant attention obtain sitter and review sitter guidelines with assigned personnel  7. Initiate Psychosocial CNS and Spiritual Care consult, as indicated  Outcome: Progressing     Problem: Safety - Adult  Goal: Free from fall injury  Outcome: Progressing     Problem: Skin/Tissue Integrity  Goal: Absence of new skin breakdown  Description: 1.  Monitor for areas of redness and/or skin breakdown  2.  Assess vascular access sites hourly  3.  Every 4-6 hours minimum:  Change oxygen saturation probe site  4.  Every 4-6 hours:  If on nasal continuous positive airway pressure, respiratory therapy assess nares and determine need for appliance change or resting period.  Outcome: Progressing     Problem: ABCDS Injury Assessment  Goal: Absence of physical injury  Outcome: Progressing     Problem: Safety - Medical Restraint  Goal: Remains free of injury from restraints (Restraint for Interference with Medical Device)  Description: INTERVENTIONS:  1. Determine that other, less restrictive measures have been tried or would not be effective before applying the restraint  2. Evaluate the patient's condition at the time of 
  Problem: Discharge Planning  Goal: Discharge to home or other facility with appropriate resources  Outcome: Progressing     Problem: Confusion  Goal: Confusion, delirium, dementia, or psychosis is improved or at baseline  Description: INTERVENTIONS:  1. Assess for possible contributors to thought disturbance, including medications, impaired vision or hearing, underlying metabolic abnormalities, dehydration, psychiatric diagnoses, and notify attending LIP  2. [place] high risk fall precautions, as indicated  3. Provide frequent short contacts to provide reality reorientation, refocusing and direction  4. Decrease environmental stimuli, including noise as appropriate  5. Monitor and intervene to maintain adequate nutrition, hydration, elimination, sleep and activity  6. If unable to ensure safety without constant attention obtain sitter and review sitter guidelines with assigned personnel  7. Initiate Psychosocial CNS and Spiritual Care consult, as indicated  Outcome: Progressing     Problem: Safety - Adult  Goal: Free from fall injury  Outcome: Progressing     Problem: ABCDS Injury Assessment  Goal: Absence of physical injury  Outcome: Progressing     Problem: Safety - Medical Restraint  Goal: Remains free of injury from restraints (Restraint for Interference with Medical Device)  Description: INTERVENTIONS:  1. Determine that other, less restrictive measures have been tried or would not be effective before applying the restraint  2. Evaluate the patient's condition at the time of restraint application  3. Inform patient/family regarding the reason for restraint  4. Q2H: Monitor safety, psychosocial status, comfort, nutrition and hydration  Outcome: Progressing     Problem: Pain  Goal: Verbalizes/displays adequate comfort level or baseline comfort level  Outcome: Progressing     Problem: Nutrition Deficit:  Goal: Optimize nutritional status  Outcome: Progressing     
  Problem: Discharge Planning  Goal: Discharge to home or other facility with appropriate resources  Outcome: Progressing     Problem: Confusion  Goal: Confusion, delirium, dementia, or psychosis is improved or at baseline  Description: INTERVENTIONS:  1. Assess for possible contributors to thought disturbance, including medications, impaired vision or hearing, underlying metabolic abnormalities, dehydration, psychiatric diagnoses, and notify attending LIP  2. [place] high risk fall precautions, as indicated  3. Provide frequent short contacts to provide reality reorientation, refocusing and direction  4. Decrease environmental stimuli, including noise as appropriate  5. Monitor and intervene to maintain adequate nutrition, hydration, elimination, sleep and activity  6. If unable to ensure safety without constant attention obtain sitter and review sitter guidelines with assigned personnel  7. Initiate Psychosocial CNS and Spiritual Care consult, as indicated  Outcome: Progressing  Flowsheets (Taken 6/5/2024 1945)  Effect of thought disturbance (confusion, delirium, dementia, or psychosis) are managed with adequate functional status: Assess for contributors to thought disturbance, including medications, impaired vision or hearing, underlying metabolic abnormalities, dehydration, psychiatric diagnoses, notify LIP     Problem: Safety - Adult  Goal: Free from fall injury  Outcome: Progressing     Problem: Skin/Tissue Integrity  Goal: Absence of new skin breakdown  Description: 1.  Monitor for areas of redness and/or skin breakdown  2.  Assess vascular access sites hourly  3.  Every 4-6 hours minimum:  Change oxygen saturation probe site  4.  Every 4-6 hours:  If on nasal continuous positive airway pressure, respiratory therapy assess nares and determine need for appliance change or resting period.  Outcome: Progressing     Problem: ABCDS Injury Assessment  Goal: Absence of physical injury  Outcome: Progressing   
  Problem: Discharge Planning  Goal: Discharge to home or other facility with appropriate resources  Outcome: Progressing     Problem: Confusion  Goal: Confusion, delirium, dementia, or psychosis is improved or at baseline  Description: INTERVENTIONS:  1. Assess for possible contributors to thought disturbance, including medications, impaired vision or hearing, underlying metabolic abnormalities, dehydration, psychiatric diagnoses, and notify attending LIP  2. [place] high risk fall precautions, as indicated  3. Provide frequent short contacts to provide reality reorientation, refocusing and direction  4. Decrease environmental stimuli, including noise as appropriate  5. Monitor and intervene to maintain adequate nutrition, hydration, elimination, sleep and activity  6. If unable to ensure safety without constant attention obtain sitter and review sitter guidelines with assigned personnel  7. Initiate Psychosocial CNS and Spiritual Care consult, as indicated  Outcome: Progressing     Problem: Safety - Adult  Goal: Free from fall injury  Outcome: Progressing     Problem: Skin/Tissue Integrity  Goal: Absence of new skin breakdown  Description: 1.  Monitor for areas of redness and/or skin breakdown  2.  Assess vascular access sites hourly  3.  Every 4-6 hours minimum:  Change oxygen saturation probe site  4.  Every 4-6 hours:  If on nasal continuous positive airway pressure, respiratory therapy assess nares and determine need for appliance change or resting period.  Outcome: Progressing     Problem: ABCDS Injury Assessment  Goal: Absence of physical injury  Outcome: Progressing     Problem: Pain  Goal: Verbalizes/displays adequate comfort level or baseline comfort level  Outcome: Progressing     Problem: Nutrition Deficit:  Goal: Optimize nutritional status  Outcome: Progressing     Problem: Neurosensory - Adult  Goal: Achieves stable or improved neurological status  Outcome: Progressing  Goal: Achieves maximal 
  Problem: Discharge Planning  Goal: Discharge to home or other facility with appropriate resources  Outcome: Progressing     Problem: Confusion  Goal: Confusion, delirium, dementia, or psychosis is improved or at baseline  Description: INTERVENTIONS:  1. Assess for possible contributors to thought disturbance, including medications, impaired vision or hearing, underlying metabolic abnormalities, dehydration, psychiatric diagnoses, and notify attending LIP  2. [place] high risk fall precautions, as indicated  3. Provide frequent short contacts to provide reality reorientation, refocusing and direction  4. Decrease environmental stimuli, including noise as appropriate  5. Monitor and intervene to maintain adequate nutrition, hydration, elimination, sleep and activity  6. If unable to ensure safety without constant attention obtain sitter and review sitter guidelines with assigned personnel  7. Initiate Psychosocial CNS and Spiritual Care consult, as indicated  Outcome: Progressing     Problem: Safety - Adult  Goal: Free from fall injury  Outcome: Progressing     Problem: Skin/Tissue Integrity  Goal: Absence of new skin breakdown  Description: 1.  Monitor for areas of redness and/or skin breakdown  2.  Assess vascular access sites hourly  3.  Every 4-6 hours minimum:  Change oxygen saturation probe site  4.  Every 4-6 hours:  If on nasal continuous positive airway pressure, respiratory therapy assess nares and determine need for appliance change or resting period.  Outcome: Progressing     Problem: ABCDS Injury Assessment  Goal: Absence of physical injury  Outcome: Progressing     Problem: Safety - Medical Restraint  Goal: Remains free of injury from restraints (Restraint for Interference with Medical Device)  Description: INTERVENTIONS:  1. Determine that other, less restrictive measures have been tried or would not be effective before applying the restraint  2. Evaluate the patient's condition at the time of 
  Problem: Discharge Planning  Goal: Discharge to home or other facility with appropriate resources  Outcome: Progressing     Problem: Confusion  Goal: Confusion, delirium, dementia, or psychosis is improved or at baseline  Description: INTERVENTIONS:  1. Assess for possible contributors to thought disturbance, including medications, impaired vision or hearing, underlying metabolic abnormalities, dehydration, psychiatric diagnoses, and notify attending LIP  2. [place] high risk fall precautions, as indicated  3. Provide frequent short contacts to provide reality reorientation, refocusing and direction  4. Decrease environmental stimuli, including noise as appropriate  5. Monitor and intervene to maintain adequate nutrition, hydration, elimination, sleep and activity  6. If unable to ensure safety without constant attention obtain sitter and review sitter guidelines with assigned personnel  7. Initiate Psychosocial CNS and Spiritual Care consult, as indicated  Outcome: Not Progressing     Problem: Safety - Adult  Goal: Free from fall injury  Outcome: Progressing     Problem: Skin/Tissue Integrity  Goal: Absence of new skin breakdown  Description: 1.  Monitor for areas of redness and/or skin breakdown  2.  Assess vascular access sites hourly  3.  Every 4-6 hours minimum:  Change oxygen saturation probe site  4.  Every 4-6 hours:  If on nasal continuous positive airway pressure, respiratory therapy assess nares and determine need for appliance change or resting period.  Outcome: Progressing     Problem: Neurosensory - Adult  Goal: Achieves stable or improved neurological status  Outcome: Not Progressing     Problem: Neurosensory - Adult  Goal: Achieves maximal functionality and self care  Outcome: Not Progressing     Problem: Confusion  Goal: Confusion, delirium, dementia, or psychosis is improved or at baseline  Description: INTERVENTIONS:  1. Assess for possible contributors to thought disturbance, including 
  Problem: Discharge Planning  Goal: Discharge to home or other facility with appropriate resources  Outcome: Progressing     Problem: Confusion  Goal: Confusion, delirium, dementia, or psychosis is improved or at baseline  Description: INTERVENTIONS:  1. Assess for possible contributors to thought disturbance, including medications, impaired vision or hearing, underlying metabolic abnormalities, dehydration, psychiatric diagnoses, and notify attending LIP  2. [place] high risk fall precautions, as indicated  3. Provide frequent short contacts to provide reality reorientation, refocusing and direction  4. Decrease environmental stimuli, including noise as appropriate  5. Monitor and intervene to maintain adequate nutrition, hydration, elimination, sleep and activity  6. If unable to ensure safety without constant attention obtain sitter and review sitter guidelines with assigned personnel  7. Initiate Psychosocial CNS and Spiritual Care consult, as indicated  Outcome: Progressing     Problem: Safety - Adult  Goal: Free from fall injury  Outcome: Progressing     Problem: Skin/Tissue Integrity  Goal: Absence of new skin breakdown  Description: 1.  Monitor for areas of redness and/or skin breakdown  2.  Assess vascular access sites hourly  3.  Every 4-6 hours minimum:  Change oxygen saturation probe site  4.  Every 4-6 hours:  If on nasal continuous positive airway pressure, respiratory therapy assess nares and determine need for appliance change or resting period.  Outcome: Progressing     Problem: ABCDS Injury Assessment  Goal: Absence of physical injury  Outcome: Progressing     Problem: Safety - Medical Restraint  Goal: Remains free of injury from restraints (Restraint for Interference with Medical Device)  Description: INTERVENTIONS:  1. Determine that other, less restrictive measures have been tried or would not be effective before applying the restraint  2. Evaluate the patient's condition at the time of 
  Problem: Neurosensory - Adult  Goal: Achieves stable or improved neurological status  6/19/2024 0834 by Jose G Owens RN  Outcome: Not Progressing  6/18/2024 2218 by Corinne Styles RN  Outcome: Not Progressing  Goal: Achieves maximal functionality and self care  6/19/2024 0834 by Jose G Owens RN  Outcome: Not Progressing  6/18/2024 2218 by Corinne Styles RN  Outcome: Not Progressing     Problem: Discharge Planning  Goal: Discharge to home or other facility with appropriate resources  6/19/2024 0834 by Jose G Owens RN  Outcome: Progressing  6/18/2024 2218 by Corinne Styles RN  Outcome: Progressing     Problem: Confusion  Goal: Confusion, delirium, dementia, or psychosis is improved or at baseline  Description: INTERVENTIONS:  1. Assess for possible contributors to thought disturbance, including medications, impaired vision or hearing, underlying metabolic abnormalities, dehydration, psychiatric diagnoses, and notify attending LIP  2. [place] high risk fall precautions, as indicated  3. Provide frequent short contacts to provide reality reorientation, refocusing and direction  4. Decrease environmental stimuli, including noise as appropriate  5. Monitor and intervene to maintain adequate nutrition, hydration, elimination, sleep and activity  6. If unable to ensure safety without constant attention obtain sitter and review sitter guidelines with assigned personnel  7. Initiate Psychosocial CNS and Spiritual Care consult, as indicated  6/19/2024 0834 by Jose G Owens RN  Outcome: Progressing  6/18/2024 2218 by Corinne Styles RN  Outcome: Not Progressing     Problem: Safety - Adult  Goal: Free from fall injury  6/19/2024 0834 by Jose G Owens RN  Outcome: Progressing  6/18/2024 2218 by Corinne Styles RN  Outcome: Progressing     Problem: Skin/Tissue Integrity  Goal: Absence of new skin breakdown  Description: 1.  Monitor for areas of redness and/or skin breakdown  2.  Assess 
  Problem: Neurosensory - Adult  Goal: Achieves stable or improved neurological status  6/19/2024 0834 by Jose G Owens RN  Outcome: Not Progressing  Goal: Achieves maximal functionality and self care  6/19/2024 0834 by Jose G Owens RN  Outcome: Not Progressing     Problem: Discharge Planning  Goal: Discharge to home or other facility with appropriate resources  6/19/2024 1936 by Sophie Hernandes RN  Outcome: Progressing  6/19/2024 1936 by Sophie Hernandes RN  Outcome: Progressing  6/19/2024 0834 by Jose G Owens RN  Outcome: Progressing     Problem: Confusion  Goal: Confusion, delirium, dementia, or psychosis is improved or at baseline  Description: INTERVENTIONS:  1. Assess for possible contributors to thought disturbance, including medications, impaired vision or hearing, underlying metabolic abnormalities, dehydration, psychiatric diagnoses, and notify attending LIP  2. [place] high risk fall precautions, as indicated  3. Provide frequent short contacts to provide reality reorientation, refocusing and direction  4. Decrease environmental stimuli, including noise as appropriate  5. Monitor and intervene to maintain adequate nutrition, hydration, elimination, sleep and activity  6. If unable to ensure safety without constant attention obtain sitter and review sitter guidelines with assigned personnel  7. Initiate Psychosocial CNS and Spiritual Care consult, as indicated  6/19/2024 1936 by Sophie Hernandes RN  Outcome: Progressing  6/19/2024 1936 by Sophie Hernandes RN  Outcome: Progressing  6/19/2024 0834 by Jose G Owens RN  Outcome: Progressing     Problem: Safety - Adult  Goal: Free from fall injury  6/19/2024 1936 by Sophie Hernandes RN  Outcome: Progressing  6/19/2024 0834 by Jose G Owens RN  Outcome: Progressing     Problem: Skin/Tissue Integrity  Goal: Absence of new skin breakdown  Description: 1.  Monitor for areas of redness and/or skin breakdown  2.  Assess vascular 
  Problem: Respiratory - Adult  Goal: Achieves optimal ventilation and oxygenation  6/18/2024 1636 by Katy Montalvo RCP  Outcome: Progressing     
  Problem: Respiratory - Adult  Goal: Achieves optimal ventilation and oxygenation  Outcome: Progressing     
  Problem: Respiratory - Adult  Goal: Achieves optimal ventilation and oxygenation  Outcome: Progressing     Problem: Genitourinary - Adult  Goal: Urinary catheter remains patent  Outcome: Progressing     Problem: Metabolic/Fluid and Electrolytes - Adult  Goal: Electrolytes maintained within normal limits  Outcome: Progressing     Problem: Cardiovascular - Adult  Goal: Maintains optimal cardiac output and hemodynamic stability  Outcome: Progressing     Problem: Neurosensory - Adult  Goal: Achieves stable or improved neurological status  Outcome: Not Progressing  Goal: Achieves maximal functionality and self care  Outcome: Not Progressing     Problem: Skin/Tissue Integrity - Adult  Goal: Skin integrity remains intact  Outcome: Not Progressing     
  Problem: Respiratory - Adult  Goal: Achieves optimal ventilation and oxygenation  Outcome: Progressing   DAILY VENTILATOR WEANING ASSESSMENT PERFORMED    P/FIO2 Ratio =  178       (<100= do not Wean)                  Cs =   52                       (<32= Instability)  Plat. Pressure = 16  MV = 11.7  RSBI =    Instabilities:       Cardiovascular =        CNS = 3       Respiratory =       Metabolic =    Parameters    no    Wean per protocol  no    Ask Physician for a weaning plan yes    Additional Comments:     Performed by Rosaura Ellison RCP RRT      Reference Table:    Cardiovascular     CNS      1. Mean BP less than or equal to 75   1. Neuromuscular blockade  2. Heart Rate greater than 130   2. RASS of -3, -4, -5  3. Myocardial Ischemia    3. RASS of +3, +4  4. Mechanical Assist Device    4. ICP greater than 15 or             Intracranial Hypertension         Respiratory      Metabolic  1. PEEP equal to or greater than 10cm/H20  1.Temp. (8hrs) less than 95 or > 103  2. Respiratory Rate greater than 35   2. WBC < 5000 or > 77285  3. Minute Volume greater than 15L  4. pH less than 7.30  5. Deteriorating chest X-ray         
  Problem: Respiratory - Adult  Goal: Achieves optimal ventilation and oxygenation  Outcome: Progressing  Flowsheets (Taken 6/18/2024 0038)  Achieves optimal ventilation and oxygenation:   Assess for changes in respiratory status   Position to facilitate oxygenation and minimize respiratory effort   Assess the need for suctioning and aspirate as needed   Respiratory therapy support as indicated   Oxygen supplementation based on oxygen saturation or arterial blood gases     
Progressing       Problem: Discharge Planning  Goal: Discharge to home or other facility with appropriate resources  6/15/2024 0544 by Carmen Amador RN  Outcome: Not Progressing     Problem: Confusion  Goal: Confusion, delirium, dementia, or psychosis is improved or at baseline  Description: INTERVENTIONS:  1. Assess for possible contributors to thought disturbance, including medications, impaired vision or hearing, underlying metabolic abnormalities, dehydration, psychiatric diagnoses, and notify attending LIP  2. [place] high risk fall precautions, as indicated  3. Provide frequent short contacts to provide reality reorientation, refocusing and direction  4. Decrease environmental stimuli, including noise as appropriate  5. Monitor and intervene to maintain adequate nutrition, hydration, elimination, sleep and activity  6. If unable to ensure safety without constant attention obtain sitter and review sitter guidelines with assigned personnel  7. Initiate Psychosocial CNS and Spiritual Care consult, as indicated  6/15/2024 0544 by Carmen Amador RN  Outcome: Not Progressing     Problem: Neurosensory - Adult  Goal: Achieves stable or improved neurological status  6/15/2024 0544 by Carmen Amador RN  Outcome: Not Progressing  Goal: Achieves maximal functionality and self care  6/15/2024 0544 by Carmen Amador RN  Outcome: Not Progressing     Problem: Respiratory - Adult  Goal: Achieves optimal ventilation and oxygenation  6/15/2024 0850 by Dale Matthews RN  Outcome: Progressing  6/15/2024 0544 by Carmen Amador RN  Outcome: Not Progressing     Problem: Genitourinary - Adult  Goal: Urinary catheter remains patent  6/15/2024 0544 by Carmen Amador RN  Outcome: Not Progressing     
time of restraint application  3. Inform patient/family regarding the reason for restraint  4. Q2H: Monitor safety, psychosocial status, comfort, nutrition and hydration  6/7/2024 0820 by Miguelangel Toussaint RN  Outcome: Progressing  6/7/2024 0820 by Miguelangel Toussaint RN  Outcome: Progressing  6/6/2024 2231 by Julia Weir, RN  Outcome: Not Progressing     Problem: Nutrition Deficit:  Goal: Optimize nutritional status  6/7/2024 0820 by Miguelangel Toussaint RN  Outcome: Progressing  6/7/2024 0820 by Miguelangel Toussaint RN  Outcome: Progressing  6/6/2024 2231 by Julia Weir, RN  Outcome: Not Progressing

## 2024-06-20 NOTE — CARE COORDINATION
Care Coordination: LOS 18 day.  [facility] from [facility]. Pt accepted by [facility]  at [facility].  Will need chart copy and xray on disc. PS'd Dr Riley and kari from ICU standpoint. Spoke to son Krish and he is in agreement.  Explained they are trying to set up transport but has to be [facility], [facility]. If they can obtain transport, pt will transport today. He asked to call and let him know.  N2N  ext [phone]  MICU 2nd floor.  I have PS'd Dr Ansari and updated her.  Will need dc orders once transport is confirmed.. Ambulance transport on chart    Electronically signed by Mary Mccallum RN on 6/20/2024 at 2:10 PM

## 2024-06-20 NOTE — PROCEDURES
PROCEDURE NOTE  Date: 6/5/2024   Name: Jose G Will  YOB: 1965    Procedures  Attempted ABGS x 3 , unable to get a  proper sample. RN notified.          
PROCEDURE NOTE  Date: 6/6/2024   Name: Jose G Will  YOB: 1965    Procedures        Intubation Procedure Note    Indication:  Respiratory Failure    Time Out:  Completed immediately prior to the start of the procedure which included verification of the correct patient, correct site and agreement on the procedure to be done.    Consent:  Consent was not able to be obtained because the procedure was emergent or the patient was unable to give consent and a surrogate decision maker was not available.     Procedure being performed:   Orotracheal intubation    Pre-oxygenation:  2 minutes of 100% FiO2 administered via bag and mask.    Medications:   Diprivan    Description/Findings:  Visualization: GlideScope was utilized to intubate the patient. Cormack-Lehane: Grade 3: only the epiglottis is visible. A 8.0mm endotracheal tube was passed through the cords on the first attempt. The tube was secured at 24cm at the lip. Tracheal position was confirmed using end-tidal CO2 detector, absence of breath sounds over epigastric region, and bilateral breath sounds in lungs. Respiratory therapy was at the bedside and assisted with ventilator management.      Estimated Blood Loss:  None    Complications:   No apparent complications    Follow up Chest X-Ray:   Ordered.     Proceduralist:   I personally performed the procedure documented as signed by this procedure note.     Assistant(s):  Not applicable    Supervision:  No supervision required    Electronically signed by Flash Graham MD on 6/6/2024 at 5:07 PM       
PROCEDURE NOTE  Date: 6/6/2024   Name: Jose G Will  YOB: 1965    Procedures      Therapeutic Bronchoscopy Procedure Note    Indication:  Pneumonia    Time Out:  Not able to be completed due to emergent nature of procedure.    Consent:  Consent was not able to be obtained because the procedure was emergent or the patient was unable to give consent and a surrogate decision maker was not available.    Sedation:   Additional sedation medication(s) given: Propofol 120 mg intravenously    Procedure in detail:   Diagnostic Bronchoscopy via ETT: All required PPE, including an N95 mask, was worn by myself and all individuals inside the room throughout the procedure. The scope was advanced through the ETT until the distal trachea was visualized. The ETT was around 5 cm above the lauren. The airways were examined in detail through the subsegmental level of both the right and left lungs. The patient was kept connected to the ventilator throughout the entire procedure.    Findings:   Massive amount of creamy secretions and mucous plugging on bilateral lungs. R > L .     All the secretions were suctioned out with a large therapeutic scope ( orange)     Specimens:   Specimen samples were obtained and are being sent to lab for further analysis.    Estimated Blood Loss:  < 5mL     Complications:  No apparent complications    Proceduralist:   I personally performed the procedure documented as signed by this procedure note.     Assistant(s):  Not applicable    Supervision:  No supervision required    Electronically signed by Flash Graham MD on 6/6/2024 at 5:09 PM       
PROCEDURE NOTE  Date: 6/7/2024   Name: Jose G Will  YOB: 1965    Procedures:  Portable EEG with no video:    Method:   This recording was done using 16 channel of EEG recording and 1 channel of EKG recording. The recording is done at the patient's bedside in the ICU. Photic stimulation is not performed as activation procedure.     Interpretation:   There is a burst suppression pattern seen in this recording. The burst lasts 1-2 seconds with 5-6 Hz theta activity and suppression in EEG lasts 3-5 seconds with 2-3Hz delta activity. There were no epileptiform abnormalities or electrographic seizures in this recording.     EKG demonstrates a regular rhythm with rate of 90 bpm.     Summary: This is an abnormal Electroencephalogram with burst suppression pattern of EEG activity which can be secondary to use of sedation or hypoxic ischemic encephalopathy. There are no epileptiform abnormality or electrographic seizures in this recording.      Sarita Schwartz MD              
PROCEDURE NOTE  Date: 6/7/2024   Name: Jose G Will  YOB: 1965    ProceduresCentral Line Insertion     Procedure: left internal jugular vein Triple Lumen Catheter placement.    Indications: vascular access and centrally administered medications    Consent: consent was provided by phone consent by POA    Number of sticks: 1    Number of Kits used: 1    Procedure: Time Out: Immediately prior to the procedure a \"timeout\" was called to verify the correct patient and procedure. The patient was place in the trendelenburg position and the skin over the left internal jugular vein was draped in a sterile fashion. Local anesthesia was desation.  With Ultrasound guidance a large bore needle was used to identify the vein, dark non pulsatile blood returned. The guide wire was then inserted through the needle with minimal resistance. 2 mm nick was made in the skin beside the guidewire. Then a dilator was inserted and removed. A triple lumen catheter was then inserted into the vessel over the guide wire using the Seldinger technique to the 15 cm jacques.  All ports showed good, free flowing blood return and were flushed with saline solution.  The catheter was then securely fastened to the skin with sutures and with an adhesive dressing and covered with a bio patch and sterile dressing.  A post procedure X-ray was ordered and is still pending at this time.    Complications: None   The patient tolerated the procedure well.    Estimated blood loss: 1 ml.    JANAE Mendes - CNP   6/7/2024  1430pm                          
done  Photic stimulation: Not done  Reactivity to stimulation: Not tested    Abnormalities:    I. Seizures?  No    II. Rhythmic or Periodic Patterns?  No    III. Other Abnormalities?  No

## 2024-06-20 NOTE — PROGRESS NOTES
Critical Care Team - Daily Progress Note         Date and time: 6/20/2024 7:51 AM  Patient's name:  Jose G Will  Medical Record Number: [id number]  Patient's account/billing number: [id number]  Patient's YOB: 1965  Age: 59 y.o.  Date of Admission: 6/2/2024  7:18 AM  Length of stay during current admission: 18      Primary Care Physician: Nilda Galloway MD  ICU Attending Physician:      Code Status: DNR-CCA    Reason for ICU admission: Respiratory Failure       SUBJECTIVE:     Pebbles Will (Dennis) is a 59 year old who identifies as female, was originally admitted on June 2nd, 2024 from [facility] with concerns for AMS. When the patient was evaluated in the ED, they were found to be alert and oriented x2 (baseline is altered but patient appeared more confused). Labs significant to the admission include: Sodium 167, potassium 3.2, chloride 133, CRP 37, respiratory panel positive for parainfluenza.  CT head and cervical spine negative for any acute process. The patient was admitted to the floor with nephrology, neurology, infectious disease and psychiatric consultation. On June 6th, 2024, the patient was found in respiratory distress, not able to handle secretions and more altered. The patient was transferred to MICU where they required emergent intubation and bronchoscopy.      Upon evaluation in MICU, the patient is intubated and sedated.  Currently dyssynchronous with the vent, will increase sedation to assist with synchrony.  Hemodynamically stable.  Will obtain chest x-ray post bronchoscopy.  Currently a full code.    6/17/2024: plan for LP, check immunoglobulins, IVIG ordered, CT Head, LP (LP was unrevealing other than protein elevation 52.9 and glucose 78)      6/18/2024: Trach/PEG placement, IVIG to start x 5 days     6/19/2024: TMAX 101.4, mild head motions, no seizure activity, await transfer to PICU     6/20/2024: remains on ACVC, Day 3/5 of IVIG 
       OT consult received and appreciated. Chart reviewed. Pt discussed at rounds and consulted RN. Pt is not appropriate for occupational therapy services at this time. Will d/c orders- please re-consult as indicated. Thank you. Zuleyma Kerns, OTR/L # [id number]     
    [facility] Hospitalist Progress Note      Synopsis: Patient with a history of hypothyroidism, myasthenia gravis, depression, asthma, GERD, hyperlipidemia, hypertension admitted on 6/2/2024 for altered mental status in the setting of possible right arm cellulitis and hypernatremia.  Nephrology consulted for worsening hypernatremia.  Hypernatremia improving gradually.  Patient's mental status does not appear to be improving, neurology has been consulted and have initiated workup.  Decompensated on 6/6 and was transferred to the ICU for inability to protect his airway, ultimately intubated and underwent bronchoscopy found to have thick secretions.  Cultures positive for Staph aureus, on vancomycin for treatment pneumonia.  Remains in the ICU. Plan for trach and peg.  Subjective  Patient seen and examined.  Intubated  Temp improved,  T max overnight.     exam:  /81   Pulse (!) 102   Temp 98.7 °F (37.1 °C) (Temporal)   Resp 16   Ht 1.829 m (6')   Wt 119.1 kg (262 lb 9.1 oz)   SpO2 99%   BMI 35.61 kg/m²     General Appearance: Intubated   Skin: Multiple scratches and scabbed areas to bilateral upper and lower extremities.    Eyes: pupils equal, round, and reactive to light  Neck: trach   Pulmonary/Chest: clear to auscultation bilaterally- no wheezes, rales or rhonchi, normal air movement, no respiratory distress  Cardiovascular: normal rate, normal S1 and S2 and no carotid bruits  Abdomen: soft, non-tender, non-distended, normal bowel sounds  Extremities: no cyanosis, no clubbing. + edema  Neurologic: Off sedation, not opening eyes or following commands       Medications:  Reviewed    Infusion Medications    sodium chloride      midazolam Stopped (06/19/24 1028)    fentaNYL 50 mcg/hr (06/19/24 3531)    sodium chloride       Scheduled Medications    clog zapper  10 mL PEG Tube Once    hydrALAZINE  25 mg PEG Tube TID    docusate sodium  100 mg Oral Daily    lidocaine-EPINEPHrine  20 mL IntraDERmal Once    
    [facility] Hospitalist Progress Note      Synopsis: Patient with a history of hypothyroidism, myasthenia gravis, depression, asthma, GERD, hyperlipidemia, hypertension admitted on 6/2/2024 for altered mental status in the setting of possible right arm cellulitis and hypernatremia.  Nephrology consulted for worsening hypernatremia.  Hypernatremia improving gradually.  Patient's mental status does not appear to be improving, neurology has been consulted and have initiated workup.  Decompensated on 6 6 and was transferred to the ICU for inability to protect his airway, ultimately intubated and underwent bronchoscopy found to have thick secretions.  Cultures positive for Staph aureus, on vancomycin for treatment pneumonia.  EEG abnormal, await final neurology recs.     Subjective  Sedated  No acute events overnight  FiO2 requirements going down    Exam:  BP (!) 108/57   Pulse 78   Temp 99.1 °F (37.3 °C) (Esophageal)   Resp 22   Ht 1.829 m (6' 0.01\")   Wt 115.7 kg (255 lb 1.2 oz)   SpO2 100%   BMI 34.59 kg/m²     General Appearance: alert and oriented to person and time.  Anxious and confused.    Skin: Multiple scratches and scabbed areas to bilateral upper and lower extremities.  Right FA erythema and warm  Eyes: pupils equal, round, and reactive to light  Neck: neck supple and non tender without mass   Pulmonary/Chest: clear to auscultation bilaterally- no wheezes, rales or rhonchi, normal air movement, no respiratory distress, intermittent tachypnea, gurgling noises unable to manage secretions  Cardiovascular: normal rate, normal S1 and S2 and no carotid bruits  Abdomen: soft, non-tender, non-distended, normal bowel sounds  Extremities: no cyanosis, no clubbing and no edema  Neurologic: Speech sometimes incomprehensible occasionally clear.  Repetitive speech.       Medications:  Reviewed    Infusion Medications    propofol 40 mcg/kg/min (06/09/24 3349)    sodium chloride       Scheduled Medications    
    [facility] Hospitalist Progress Note      Synopsis: Patient with a history of hypothyroidism, myasthenia gravis, depression, asthma, GERD, hyperlipidemia, hypertension admitted on 6/2/2024 for altered mental status in the setting of possible right arm cellulitis and hypernatremia.  Nephrology consulted for worsening hypernatremia.  Hypernatremia improving gradually.  Patient's mental status does not appear to be improving, neurology has been consulted and have initiated workup.  Decompensated on 6/6 and was transferred to the ICU for inability to protect his airway, ultimately intubated and underwent bronchoscopy found to have thick secretions.  Cultures positive for Staph aureus, on vancomycin for treatment pneumonia.  EEG abnormal, await final neurology recs.     Subjective  Patient seen and examined chart reviewed discussed with staff  Patient remains intubated in the ICU initially yesterday family was considering comfort measures of life however they change their mind at this time do not like to give patient more time.  Continue current management as per ICU patient is off sedation  exam:  BP (!) 171/86   Pulse (!) 116   Temp 98 °F (36.7 °C) (Axillary)   Resp 21   Ht 1.829 m (6')   Wt 119.1 kg (262 lb 9.1 oz)   SpO2 97%   BMI 35.61 kg/m²     General Appearance: Intubated   Skin: Multiple scratches and scabbed areas to bilateral upper and lower extremities.  Right FA erythema and warm  Eyes: pupils equal, round, and reactive to light  Neck: neck supple and non tender without mass   Pulmonary/Chest: clear to auscultation bilaterally- no wheezes, rales or rhonchi, normal air movement, no respiratory distress, intermittent tachypnea, gurgling noises unable to manage secretions  Cardiovascular: normal rate, normal S1 and S2 and no carotid bruits  Abdomen: soft, non-tender, non-distended, normal bowel sounds  Extremities: no cyanosis, no clubbing and no edema  Neurologic: Unable to assess patient intubated and 
    [facility] Hospitalist Progress Note      Synopsis: Patient with a history of hypothyroidism, myasthenia gravis, depression, asthma, GERD, hyperlipidemia, hypertension admitted on 6/2/2024 for altered mental status in the setting of possible right arm cellulitis and hypernatremia.  Nephrology consulted for worsening hypernatremia.  Hypernatremia improving gradually.  Patient's mental status does not appear to be improving, neurology has been consulted and have initiated workup.  Decompensated on 6/6 and was transferred to the ICU for inability to protect his airway, ultimately intubated and underwent bronchoscopy found to have thick secretions.  Cultures positive for Staph aureus, on vancomycin for treatment pneumonia.  EEG abnormal, await final neurology recs.     Subjective  Seen and examined chart reviewed patient remains intubated in the ICU.  NG tube in place his sodium i remains 148.  H hemoglobin 7.1   Low-grade fevers overnight    exam:  BP (!) 164/83   Pulse (!) 130   Temp 99.7 °F (37.6 °C) (Esophageal)   Resp (!) 32   Ht 1.829 m (6')   Wt 119.1 kg (262 lb 9.1 oz)   SpO2 95%   BMI 35.61 kg/m²     General Appearance: Intubated and sedated  Skin: Multiple scratches and scabbed areas to bilateral upper and lower extremities.  Right FA erythema and warm  Eyes: pupils equal, round, and reactive to light  Neck: neck supple and non tender without mass   Pulmonary/Chest: clear to auscultation bilaterally- no wheezes, rales or rhonchi, normal air movement, no respiratory distress, intermittent tachypnea, gurgling noises unable to manage secretions  Cardiovascular: normal rate, normal S1 and S2 and no carotid bruits  Abdomen: soft, non-tender, non-distended, normal bowel sounds  Extremities: no cyanosis, no clubbing and no edema  Neurologic: Unable to assess patient intubated and sedated.       Medications:  Reviewed    Infusion Medications    sodium chloride      propofol Stopped (06/11/24 7281)    sodium 
    [facility] Hospitalist Progress Note      Synopsis: Patient with a history of hypothyroidism, myasthenia gravis, depression, asthma, GERD, hyperlipidemia, hypertension admitted on 6/2/2024 for altered mental status in the setting of possible right arm cellulitis and hypernatremia.  Nephrology consulted for worsening hypernatremia.  Hypernatremia improving gradually.  Patient's mental status does not appear to be improving, neurology has been consulted and have initiated workup.  Decompensated on 6/6 and was transferred to the ICU for inability to protect his airway, ultimately intubated and underwent bronchoscopy found to have thick secretions.  Cultures positive for Staph aureus, on vancomycin for treatment pneumonia.  Remains in the ICU. Plan for trach and peg.  Subjective  Patient seen and examined.  Intubated, on sedation  S/p trach/peg yesterday   Tmax 101.9 overnight.   Moving head this morning but not following commands or opening eyes.     exam:  BP (!) 163/71   Pulse 100   Temp 99.4 °F (37.4 °C) (Temporal)   Resp 28   Ht 1.829 m (6')   Wt 119.1 kg (262 lb 9.1 oz)   SpO2 100%   BMI 35.61 kg/m²     General Appearance: Intubated   Skin: Multiple scratches and scabbed areas to bilateral upper and lower extremities.  Right FA erythema and warm  Eyes: pupils equal, round, and reactive to light  Neck: neck supple and non tender without mass   Pulmonary/Chest: clear to auscultation bilaterally- no wheezes, rales or rhonchi, normal air movement, no respiratory distress, intermittent tachypnea, gurgling noises unable to manage secretions  Cardiovascular: normal rate, normal S1 and S2 and no carotid bruits  Abdomen: soft, non-tender, non-distended, normal bowel sounds  Extremities: no cyanosis, no clubbing and no edema  Neurologic: Off sedation, not opening eyes or following commands       Medications:  Reviewed    Infusion Medications    sodium chloride      midazolam 2 mg/hr (06/19/24 9693)    fentaNYL 50 
    [facility] Hospitalist Progress Note      Synopsis: Patient with a history of hypothyroidism, myasthenia gravis, depression, asthma, GERD, hyperlipidemia, hypertension admitted on 6/2/2024 for altered mental status in the setting of possible right arm cellulitis and hypernatremia.  Nephrology consulted for worsening hypernatremia.  Hypernatremia improving gradually.  Patient's mental status does not appear to be improving, neurology has been consulted and have initiated workup.  Decompensated on 6/6 and was transferred to the ICU for inability to protect his airway, ultimately intubated and underwent bronchoscopy found to have thick secretions.  Cultures positive for Staph aureus, on vancomycin for treatment pneumonia.  EEG abnormal, await final neurology recs.     Subjective  Patient seen and examined.  Intubated, off sedation    exam:  BP (!) 145/74   Pulse (!) 107   Temp 99.7 °F (37.6 °C) (Axillary)   Resp 17   Ht 1.829 m (6')   Wt 119.1 kg (262 lb 9.1 oz)   SpO2 98%   BMI 35.61 kg/m²     General Appearance: Intubated   Skin: Multiple scratches and scabbed areas to bilateral upper and lower extremities.  Right FA erythema and warm  Eyes: pupils equal, round, and reactive to light  Neck: neck supple and non tender without mass   Pulmonary/Chest: clear to auscultation bilaterally- no wheezes, rales or rhonchi, normal air movement, no respiratory distress, intermittent tachypnea, gurgling noises unable to manage secretions  Cardiovascular: normal rate, normal S1 and S2 and no carotid bruits  Abdomen: soft, non-tender, non-distended, normal bowel sounds  Extremities: no cyanosis, no clubbing and no edema  Neurologic: Off sedation, not opening eyes or following commands       Medications:  Reviewed    Infusion Medications    sodium chloride      sodium chloride      propofol Stopped (06/11/24 5356)    sodium chloride       Scheduled Medications    midazolam  4 mg IntraVENous See Admin Instructions    Rosanne  
    [facility] Hospitalist Progress Note      Synopsis: Patient with a history of hypothyroidism, myasthenia gravis, depression, asthma, GERD, hyperlipidemia, hypertension admitted on 6/2/2024 for altered mental status in the setting of possible right arm cellulitis and hypernatremia.  Nephrology consulted for worsening hypernatremia    Subjective  Continues to be confused  Exam:  BP (!) 167/82   Pulse 60   Temp 98.7 °F (37.1 °C) (Oral)   Resp 20   Ht 1.829 m (6' 0.01\")   Wt 117.2 kg (258 lb 6.1 oz)   SpO2 98%   BMI 35.03 kg/m²     General Appearance: alert and oriented to person and time.  Anxious and confused.    Skin: Multiple scratches and scabbed areas to bilateral upper and lower extremities.  Right FA erythema and warm  Eyes: pupils equal, round, and reactive to light  Neck: neck supple and non tender without mass   Pulmonary/Chest: clear to auscultation bilaterally- no wheezes, rales or rhonchi, normal air movement, no respiratory distress  Cardiovascular: normal rate, normal S1 and S2 and no carotid bruits  Abdomen: soft, non-tender, non-distended, normal bowel sounds  Extremities: no cyanosis, no clubbing and no edema  Neurologic: Speech sometimes incomprehensible occasionally clear.  Repetitive speech.       Medications:  Reviewed    Infusion Medications    dextrose 125 mL/hr at 06/04/24 0224    sodium chloride       Scheduled Medications    DESMOpressin  2 mcg SubCUTAneous BID    divalproex  125 mg Oral 2 times per day    melatonin  6 mg Oral QPM    sodium chloride flush  5-40 mL IntraVENous 2 times per day    amLODIPine  2.5 mg Oral Daily    apixaban  2.5 mg Oral BID    atorvastatin  10 mg Oral QHS    carbidopa-levodopa  1 tablet Oral TID    famotidine  20 mg Oral Daily    finasteride  5 mg Oral Daily    [Held by provider] gabapentin  800 mg Oral TID    medroxyPROGESTERone  10 mg Oral Daily    oxyBUTYnin  5 mg Oral BID    pantoprazole  40 mg Oral Daily    propranolol  10 mg Oral BID    tamsulosin  0.4 
    [facility] Hospitalist Progress Note      Synopsis: Patient with a history of hypothyroidism, myasthenia gravis, depression, asthma, GERD, hyperlipidemia, hypertension admitted on 6/2/2024 for altered mental status in the setting of possible right arm cellulitis and hypernatremia.  Nephrology consulted for worsening hypernatremia.  Hypernatremia improving gradually.  Patient's mental status does not appear to be improving, neurology has been consulted and have initiated workup.  Decompensated on 6 6 and was transferred to the ICU for inability to protect his airway, ultimately intubated and underwent bronchoscopy found to have thick secretions.  EEG is pending    Subjective  Sedated  No acute events overnight    Exam:  /61   Pulse 80   Temp 97.7 °F (36.5 °C) (Temporal)   Resp 21   Ht 1.829 m (6' 0.01\")   Wt 118.1 kg (260 lb 5.8 oz)   SpO2 100%   BMI 35.30 kg/m²     General Appearance: alert and oriented to person and time.  Anxious and confused.    Skin: Multiple scratches and scabbed areas to bilateral upper and lower extremities.  Right FA erythema and warm  Eyes: pupils equal, round, and reactive to light  Neck: neck supple and non tender without mass   Pulmonary/Chest: clear to auscultation bilaterally- no wheezes, rales or rhonchi, normal air movement, no respiratory distress, intermittent tachypnea, gurgling noises unable to manage secretions  Cardiovascular: normal rate, normal S1 and S2 and no carotid bruits  Abdomen: soft, non-tender, non-distended, normal bowel sounds  Extremities: no cyanosis, no clubbing and no edema  Neurologic: Speech sometimes incomprehensible occasionally clear.  Repetitive speech.       Medications:  Reviewed    Infusion Medications    propofol 45 mcg/kg/min (06/07/24 0913)    sodium chloride      dextrose 125 mL/hr at 06/07/24 0633     Scheduled Medications    chlorhexidine  15 mL Mouth/Throat BID    Polyvinyl Alcohol-Povidone PF  2 drop Both Eyes Q4H    pantoprazole 
    [facility] Hospitalist Progress Note      Synopsis: Patient with a history of hypothyroidism, myasthenia gravis, depression, asthma, GERD, hyperlipidemia, hypertension admitted on 6/2/2024 for altered mental status in the setting of possible right arm cellulitis and hypernatremia.  Nephrology consulted for worsening hyponatremia    Subjective  Continues to be confused  Exam:  BP (!) 146/82   Pulse 100   Temp 98 °F (36.7 °C) (Axillary)   Resp 24   Ht 1.829 m (6' 0.01\")   Wt 117.2 kg (258 lb 6.1 oz)   SpO2 98%   BMI 35.03 kg/m²     General Appearance: alert and oriented to person and time.  Anxious and confused.    Skin: Multiple scratches and scabbed areas to bilateral upper and lower extremities.  Right FA erythema and warm  Eyes: pupils equal, round, and reactive to light  Neck: neck supple and non tender without mass   Pulmonary/Chest: clear to auscultation bilaterally- no wheezes, rales or rhonchi, normal air movement, no respiratory distress  Cardiovascular: normal rate, normal S1 and S2 and no carotid bruits  Abdomen: soft, non-tender, non-distended, normal bowel sounds  Extremities: no cyanosis, no clubbing and no edema  Neurologic: Speech sometimes incomprehensible occasionally clear.  Repetitive speech.       Medications:  Reviewed    Infusion Medications    sodium chloride       Scheduled Medications    sodium chloride flush  5-40 mL IntraVENous 2 times per day    amLODIPine  2.5 mg Oral Daily    apixaban  2.5 mg Oral BID    atorvastatin  10 mg Oral QHS    carbidopa-levodopa  1 tablet Oral TID    famotidine  20 mg Oral Daily    finasteride  5 mg Oral Daily    [Held by provider] gabapentin  800 mg Oral TID    lithium  600 mg Oral QHS    medroxyPROGESTERone  10 mg Oral Daily    oxyBUTYnin  5 mg Oral BID    paliperidone  6 mg Oral QAM    pantoprazole  40 mg Oral Daily    prazosin  2 mg Oral Nightly    propranolol  10 mg Oral BID    QUEtiapine  400 mg Oral QHS    tamsulosin  0.4 mg Oral QHS    
    [facility] Hospitalist Progress Note      Synopsis: Patient with a history of hypothyroidism, myasthenia gravis, depression, asthma, GERD, hyperlipidemia, hypertension admitted on 6/2/2024 for altered mental status in the setting of possible right arm cellulitis and hypernatremia.  Nephrology consulted for worsening hypernatremia.  Hypernatremia improving gradually.  Patient's mental status does not appear to be improving, neurology has been consulted and have initiated workup.  Decompensated on 6 6 and was transferred to the ICU for inability to protect his airway, ultimately intubated and underwent bronchoscopy found to have thick secretions.  Cultures positive for Staph aureus, on vancomycin for treatment first pneumonia.  EEG abnormal, await neurology Matt.    Subjective  Sedated  No acute events overnight  FiO2 requirements going down    Exam:  /64   Pulse 91   Temp 97.8 °F (36.6 °C) (Temporal)   Resp 28   Ht 1.829 m (6' 0.01\")   Wt 118.1 kg (260 lb 5.8 oz)   SpO2 100%   BMI 35.30 kg/m²     General Appearance: alert and oriented to person and time.  Anxious and confused.    Skin: Multiple scratches and scabbed areas to bilateral upper and lower extremities.  Right FA erythema and warm  Eyes: pupils equal, round, and reactive to light  Neck: neck supple and non tender without mass   Pulmonary/Chest: clear to auscultation bilaterally- no wheezes, rales or rhonchi, normal air movement, no respiratory distress, intermittent tachypnea, gurgling noises unable to manage secretions  Cardiovascular: normal rate, normal S1 and S2 and no carotid bruits  Abdomen: soft, non-tender, non-distended, normal bowel sounds  Extremities: no cyanosis, no clubbing and no edema  Neurologic: Speech sometimes incomprehensible occasionally clear.  Repetitive speech.       Medications:  Reviewed    Infusion Medications    propofol 40 mcg/kg/min (06/08/24 4924)    sodium chloride       Scheduled Medications    potassium 
    [facility] Hospitalist Progress Note      Synopsis: Patient with a history of hypothyroidism, myasthenia gravis, depression, asthma, GERD, hyperlipidemia, hypertension admitted on 6/2/2024 for altered mental status in the setting of possible right arm cellulitis and hypernatremia.  Nephrology consulted for worsening hypernatremia.  Hypernatremia improving gradually.  Patient's mental status does not appear to be improving, neurology has been consulted and have initiated workup.  Decompensated on 6/6 and was transferred to the ICU for inability to protect his airway, ultimately intubated and underwent bronchoscopy found to have thick secretions.  Cultures positive for Staph aureus, on vancomycin for treatment pneumonia.  EEG abnormal, await final neurology recs.     Subjective  Seen and examined chart reviewed patient remains intubated in the ICU.  NG tube in place his sodium is 148.  Hemoglobin 7.6 patient tachycardic, febrile temp 100.4  exam:  BP (!) 143/85   Pulse (!) 118   Temp 100.4 °F (38 °C) (Esophageal)   Resp 26   Ht 1.829 m (6' 0.01\")   Wt 119.1 kg (262 lb 9.1 oz)   SpO2 96%   BMI 35.60 kg/m²     General Appearance: Intubated and sedated  Skin: Multiple scratches and scabbed areas to bilateral upper and lower extremities.  Right FA erythema and warm  Eyes: pupils equal, round, and reactive to light  Neck: neck supple and non tender without mass   Pulmonary/Chest: clear to auscultation bilaterally- no wheezes, rales or rhonchi, normal air movement, no respiratory distress, intermittent tachypnea, gurgling noises unable to manage secretions  Cardiovascular: normal rate, normal S1 and S2 and no carotid bruits  Abdomen: soft, non-tender, non-distended, normal bowel sounds  Extremities: no cyanosis, no clubbing and no edema  Neurologic: Unable to assess patient intubated and sedated.       Medications:  Reviewed    Infusion Medications    sodium chloride      propofol Stopped (06/11/24 3656)    sodium 
   06/12/24 0828   Patient Observation   Pulse (!) 116   Respirations 27   SpO2 97 %   Breath Sounds   Right Upper Lobe Rhonchi;Diminished   Right Middle Lobe Diminished   Right Lower Lobe Diminished   Left Upper Lobe Rhonchi;Diminished   Left Lower Lobe Diminished   Vent Information   Ventilator Day(s) 7   Ventilator ID [id number]   Vent Mode (S)  AC/PRVC   Ventilator Settings   FiO2  35 %   Insp Time (sec) (S)  0.85 sec   Vt (Set, mL) 430 mL   Resp Rate (Set) 12 bpm   PEEP/CPAP (cmH2O) 8   Vent Patient Data (Readings)   Vt (Measured) 704 mL   Peak Inspiratory Pressure (cmH2O) 22 cmH2O   Rate Measured 26 br/min   Minute Volume (L/min) 13.5 Liters   Mean Airway Pressure (cmH2O) 12 cmH20   Plateau Pressure (cm H2O) 14 cm H2O   Driving Pressure 6   I:E Ratio 1:2.40   PEEP Intrinsic (cm H2O) 0.9 cm H2O   Static Compliance (L/cm H2O) 42   I Time/ I Time % 0 s   Backup Apnea On   Backup Rate 12 Breaths Per Minute   Backup Vt 430   Vent Alarm Settings   High Pressure (cmH2O) 50 cmH2O   Low Minute Volume (lpm) 6 L/min   High Minute Volume (lpm) 20.5 L/min   Low Exhaled Vt (ml) 300 mL   High Exhaled Vt (ml) 800 mL   RR Low (bpm) 12   RR High (bpm) 40 br/min   Apnea (secs) 20 secs   Additional Respiratoray Assessments   Humidification Source Heated wire   Humidification Temp 36.7   Circuit Condensation Drained   Ambu Bag With Mask At Bedside Yes   Airway Clearance   Suction ET Tube   Suction Device Inline suction catheter   Sputum Method Obtained Endotracheal   Sputum Amount Moderate   Sputum Color/Odor Tan;Pink tinged   Sputum Consistency Thick   ETT    Placement Date/Time: 06/06/24 1409   Present on Admission/Arrival: No  Placed By: (c) Licensed provider  Placement Verified By: Auscultation;Capnometry;Chest X-ray;Direct visualization  Preoxygenation: Yes  Mask Ventilation: Ventilated by mask (1)  Te...   Secured At 24 cm   Measured From Lips   ETT Placement Center   Secured By Commercial tube pierce       
   06/12/24 1104   Patient Observation   Pulse 97   Respirations 27   SpO2 98 %   Vent Information   Ventilator Day(s) 7   Ventilator ID [id number]   Vent Mode AC/PRVC   Ventilator Settings   FiO2  35 %   Insp Time (sec) 0.85 sec   Vt (Set, mL) 430 mL   Resp Rate (Set) 12 bpm   PEEP/CPAP (cmH2O) 8   Vent Patient Data (Readings)   Vt (Measured) 407 mL   Peak Inspiratory Pressure (cmH2O) 19 cmH2O   Rate Measured 26 br/min   Minute Volume (L/min) 10.2 Liters   Mean Airway Pressure (cmH2O) 13 cmH20   Plateau Pressure (cm H2O) 16 cm H2O   Driving Pressure 8   I:E Ratio 1:1.80   I Time/ I Time % 0 s   Backup Apnea On   Backup Rate 12 Breaths Per Minute   Backup Vt 430   Vent Alarm Settings   High Pressure (cmH2O) 50 cmH2O   Low Minute Volume (lpm) 6 L/min   High Minute Volume (lpm) 20.5 L/min   Low Exhaled Vt (ml) 300 mL   High Exhaled Vt (ml) 800 mL   RR Low (bpm) 12   RR High (bpm) 40 br/min   Apnea (secs) 20 secs   Additional Respiratoray Assessments   Humidification Source Heated wire   Humidification Temp 37   Ambu Bag With Mask At Bedside Yes   Airway Clearance   Suction ET Tube   Suction Device Inline suction catheter   Sputum Method Obtained Endotracheal   Sputum Amount Moderate   Sputum Color/Odor Tan;Pink tinged   Sputum Consistency Thick   ETT    Placement Date/Time: 06/06/24 1409   Present on Admission/Arrival: No  Placed By: (c) Licensed provider  Placement Verified By: Auscultation;Capnometry;Chest X-ray;Direct visualization  Preoxygenation: Yes  Mask Ventilation: Ventilated by mask (1)  Te...   Secured At 24 cm   Measured From Lips   Secured By Commercial tube pierce       
   06/18/24 1633   Patient Observation   Pulse 90   Respirations 17   SpO2 100 %   Vent Information   Vent Mode AC/PRVC   Ventilator Settings   FiO2  90 %   Insp Time (sec) 0.85 sec   Vt (Set, mL) 430 mL   Resp Rate (Set) 12 bpm   PEEP/CPAP (cmH2O) 8   Vent Patient Data (Readings)   Vt (Measured) 523 mL   Peak Inspiratory Pressure (cmH2O) 16 cmH2O   Rate Measured 16 br/min   Minute Volume (L/min) 7.32 Liters   Mean Airway Pressure (cmH2O) 10 cmH20   Plateau Pressure (cm H2O) 16 cm H2O   Driving Pressure 8   Inspiratory Time 0.85 sec   I:E Ratio 1:4.90   Flow Sensitivity 3 L/min   Static Compliance (L/cm H2O) 50   I Time/ I Time % 0 s   Backup Apnea On   Backup Rate 12 Breaths Per Minute   Backup Vt 430   Backup I Time 20   Vent Alarm Settings   High Pressure (cmH2O) 40 cmH2O   Low Minute Volume (lpm) 3.5 L/min   High Minute Volume (lpm) 15 L/min   Low Exhaled Vt (ml) 300 mL   High Exhaled Vt (ml) 800 mL   RR High (bpm) 35 br/min   Apnea (secs) 20 secs   Additional Respiratoray Assessments   Humidification Source Heated wire   Humidification Temp 37   Circuit Condensation Drained   Ambu Bag With Mask At Bedside Yes       
   06/18/24 2042   Patient Observation   Pulse (!) 102   Respirations 14   SpO2 100 %   Vent Information   Vent Mode AC/PRVC   Ventilator Settings   FiO2  90 %   Insp Time (sec) 0.85 sec   Vt (Set, mL) 430 mL   Resp Rate (Set) 12 bpm   PEEP/CPAP (cmH2O) 8   Vent Patient Data (Readings)   Vt (Measured) 639 mL   Peak Inspiratory Pressure (cmH2O) 22 cmH2O   Rate Measured 30 br/min   Minute Volume (L/min) 6.83 Liters   Mean Airway Pressure (cmH2O) 21 cmH20   Plateau Pressure (cm H2O) 16 cm H2O   Driving Pressure 8   Inspiratory Time 0.85 sec   I:E Ratio 1:4.90   Flow Sensitivity 3 L/min   I Time/ I Time % 0 s   Backup Apnea On   Backup Rate 12 Breaths Per Minute   Backup Vt 430   Backup I Time 20   Vent Alarm Settings   High Pressure (cmH2O) 40 cmH2O   Low Minute Volume (lpm) 3.5 L/min   High Minute Volume (lpm) 15 L/min   Low Exhaled Vt (ml) 300 mL   High Exhaled Vt (ml) 800 mL   RR High (bpm) 35 br/min   Apnea (secs) 20 secs   Additional Respiratoray Assessments   Humidification Source Heated wire   Humidification Temp 37   Ambu Bag With Mask At Bedside Yes   Airway Clearance   Suction ET Tube   Suction Device Inline suction catheter   Sputum Method Obtained Endotracheal   Sputum Amount Small   Sputum Color/Odor Bloody   Sputum Consistency Thick       
  [facility] Neurology follow up      Jose G Will is a 59 y.o.  adult     Neurology following for AMS     PMH of Myasthenia Gravis, HTN, HLD, Hypothyroidm,  Bipolar Disorder, and GERD     Admitted to [facility] on 6/2/2024 with presentation of altered mental status.  Patient had presented from [facility] with report that he had become more altered recently than normal.  She had been admitted to this facility due to report of an attempted suicide.  Patient had bruising on his back and buttocks, but nursing staff was unaware as to how this occurred.  Patient does have a history of picking at her skin with multiple scabbed areas over the upper and lower extremities reported.  She was only oriented x2.  Unclear what her previous baseline had been.  It was reported that she had a mumbling quality speech when first admitted to [facility].  Patient is wheelchair-bound at baseline. Currently he is unable to care for himself appropriately.     ID following for MRSA pneumonia - on vancomycin ; cefepime was d/c     Nephrology following for hypernatremia     Review of medications indicates that patient is currently on Eliquis but no history of coagulopathy noted or A-fib.  Patient also on Sinemet and Cogentin with no history of parkinsonism/Parkinson's disease, or dyskinesia.  Patient is on Imuran daily but currently not on any Mestinon or other therapy for myasthenia.    CTH negative.  CT C spine negative.    EEG shows encephalopathy, no seizure activity    Pt lying in bed with no family at bedside.  On sedation so exam is limited.      Allergies   Allergen Reactions    Iodine     Lactose Intolerance (Gi)     Topiramate          Objective:     BP (!) 150/82   Pulse 93   Temp 99.7 °F (37.6 °C) (Esophageal)   Resp 17   Ht 1.829 m (6' 0.01\")   Wt 119.1 kg (262 lb 9.1 oz)   SpO2 100%   BMI 35.60 kg/m²     General appearance: eyes closed, appears stated age and no distress  Head: 
  [facility]  [facility]  Progress Note    SUBJECTIVE:  Chief Complaint   Patient presents with    Altered Mental Status     From [facility]. Per facility pt more altered than usual. Hx myasthenia gravis, speech garbled but becomes more clear as day goes on. Per facility pt sat 91% room air. Pt has warm red area to right forearm. Multiple bruises all over body, bruising to back and buttocks     Patient moving head back and forth. Not responding to direction. Intubated on Vent.. No reported adverse drug reactions.      Review of systems:  As stated above in the chief complaint, otherwise negative.    Medications:  Scheduled Meds:   vancomycin  1,500 mg IntraVENous Q12H    amLODIPine  10 mg Oral Daily    sodium chloride flush  5-40 mL IntraVENous 2 times per day    white petrolatum   Topical BID    DESMOpressin  10 mcg IntraVENous BID    docusate sodium  100 mg Oral Daily    QUEtiapine  200 mg Oral BID    valproate (DEPACON) 750 mg in sodium chloride 0.9 % 100 mL IVPB  750 mg IntraVENous Q12H    lidocaine PF  5 mL IntraDERmal Once    sodium chloride flush  5-40 mL IntraVENous 2 times per day    [Held by provider] heparin flush  3 mL IntraVENous 2 times per day    chlorhexidine  15 mL Mouth/Throat BID    Polyvinyl Alcohol-Povidone PF  2 drop Both Eyes Q4H    pantoprazole (PROTONIX) 40 mg in sodium chloride (PF) 0.9 % 10 mL injection  40 mg IntraVENous Daily    lidocaine  5 mL IntraDERmal Once    sodium chloride flush  5-40 mL IntraVENous 2 times per day    [Held by provider] heparin flush  1 mL IntraVENous 2 times per day    [Held by provider] apixaban  2.5 mg Oral BID    atorvastatin  10 mg Oral QHS    carbidopa-levodopa  1 tablet Oral TID    finasteride  5 mg Oral Daily    gabapentin  800 mg Oral TID    [Held by provider] medroxyPROGESTERone  10 mg Oral Daily    oxyBUTYnin  5 mg Oral BID    [Held by provider] propranolol  10 mg Oral BID    tamsulosin  0.4 mg Oral QHS    azaTHIOprine  
  OT consult received and appreciated. Chart reviewed, discussed at AM rounds and consulted RN. Pt is not appropriate for occupational therapy services at this time. Will evaluate at a later time. Thank you. Zuleyma Kerns, OTR/L # [id number]     
  OT consult received and appreciated. Chart reviewed, discussed at AM rounds and consulted RN. Pt is not appropriate for participation in occupational therapy this date d/t medical status. Will evaluate at a later time. Thank you. Zuleyma Kerns, OTR/L # [id number]     
  [facility] Neurology follow up      Jose G Will is a 59 y.o.  adult     Neurology following for AMS     PMH of Myasthenia Gravis, HTN, HLD, Hypothyroidm,  Bipolar Disorder, and GERD     Admitted to [facility] on 6/2/2024 with presentation of altered mental status.  Patient had presented from [facility] with report that he had become more altered recently than normal.  She had been admitted to this facility due to report of an attempted suicide.  Patient had bruising on his back and buttocks, but nursing staff was unaware as to how this occurred.  Patient does have a history of picking at her skin with multiple scabbed areas over the upper and lower extremities reported.  She was only oriented x2.  Unclear what her previous baseline had been.  It was reported that she had a mumbling quality speech when first admitted to [facility].  Patient is wheelchair-bound due to weakness likely related to his myasthenia gravis.  Currently he is unable to care for himself appropriately.     Patient has remained confused since admission with continued hyponatremia.  Nephrology service is following.  Patient also with multiple scratches and scabs with possible right arm cellulitis and erythema that is being followed by infectious diseases.  Patient was started on antibiotic therapy for management.       Review of medications indicates that patient is currently on Eliquis but no history of coagulopathy noted or A-fib.  Patient also on Sinemet and Cogentin with no history of parkinsonism/Parkinson's disease, or dyskinesia.  Patient is on Imuran daily but currently not on any Mestinon or other therapy for myasthenia.    CTH negative.  CT C spine negative.    EEG shows encephalopathy, no seizure activity    Pt lying in bed with no family at bedside.  On sedation so exam is limited.      Objective:     /62   Pulse 91   Temp 97.8 °F (36.6 °C) (Temporal)   Resp 27   Ht 1.829 m (6' 0.01\")   
  P Quality Flow/Interdisciplinary Rounds Progress Note        Quality Flow Rounds held on June 17, 2024    Disciplines Attending:  Bedside nurse, charge nurse, , PT/OT, pharmacy, nursing unit leadership, , Medical residents    Jose G Will was admitted on 6/2/2024  7:18 AM    Anticipated Discharge Date:       Disposition:    Rick Score:  Rick Scale Score: 10    Readmission Risk              Risk of Unplanned Readmission:  39           Discussed patient goal for the day, patient clinical progression, and barriers to discharge.  The following Goal(s) of the Day/Commitment(s) have been identified:  LP today, future trach and peg, continue ICU care      Puneet Conde RN  June 17, 2024        
  Palliative Care Department  [phone]  Progress Note  Provider: Evette Gupta, JANAE - CNP       PATIENT: Jose G Will  : 1965  MRN: [id number]  ADMISSION DATE: 2024  7:18 AM  Referring Provider: Flash Graham MD     Palliative Medicine was consulted on hospital day 8 for assistance with Goals of care    HPI:     Clinical Summary:Jose G Will is a 59 y.o. y/o adult with a history of hypothyroidism, myasthenia gravis, depression, asthma, GERD, hyperlipidemia, hypertension, suicide 10 who presented to [facility] on 2024 with altered mental status.  She was admitted for possible right arm cellulitis and hypernatremia.  Patient's mental status was not improving and on  she was unable to protect her airway, she was intubated and transferred to the ICU.  Bronchoscopy found thick secretions.  Cultures were positive for Staph aureus and she continues on antibiotics for pneumonia.  She was also positive for parainfluenza.  CT of the head is negative.  EEG showed abnormal activity which could be from sedation or hypoxic ischemic encephalopathy.  She remains intubated and admitted to the ICU.    ASSESSMENT/PLAN:     Pertinent Hospital Diagnoses   Acute hypoxic respiratory failure  Staphylococcus aureus pneumonia  Parainfluenza  Acute metabolic encephalopathy  Myasthenia gravis  Hx of bipolar with depression    Palliative Care Encounter / Counseling Regarding Goals of Care  Please see detailed goals of care discussion as below  At this time, Jose G Will, Does Not have capacity for medical decision-making.  Capacity is time limited and situation/question specific  During encounter [place] was surrogate medical decision-maker (updated by phone)  Outcome of goals of care meeting:  Family meeting with patient's son and the ICU team for Sejal 15, 2024 at 10 AM  Palliative medicine can be available, as needed over the phone    Code status Full Code  Advanced Directives: no POA or living will in 
  Palliative Care Department  [phone]  Progress Note  Provider: Juana Fernandes APRN-CNS      PATIENT: Jose G Will  : 1965  MRN: [id number]  ADMISSION DATE: 2024  7:18 AM  Referring Provider: Flash Graham MD     Palliative Medicine was consulted on hospital day 8 for assistance with Goals of care    HPI:     Clinical Summary:Jose G Will is a 59 y.o. y/o adult with a history of hypothyroidism, myasthenia gravis, depression, asthma, GERD, hyperlipidemia, hypertension, suicide 10 who presented to [facility] on 2024 with altered mental status.  She was admitted for possible right arm cellulitis and hypernatremia.  Patient's mental status was not improving and on  she was unable to protect her airway, she was intubated and transferred to the ICU.  Bronchoscopy found thick secretions.  Cultures were positive for Staph aureus and she continues on antibiotics for pneumonia.  She was also positive for parainfluenza.  CT of the head is negative.  EEG showed abnormal activity which could be from sedation or hypoxic ischemic encephalopathy.  She remains intubated and admitted to the ICU.    ASSESSMENT/PLAN:     Pertinent Hospital Diagnoses   Acute hypoxic respiratory failure  Staphylococcus aureus pneumonia  Parainfluenza  Acute metabolic encephalopathy  Myasthenia gravis  Hx of bipolar with depression    Palliative Care Encounter / Counseling Regarding Goals of Care  Please see detailed goals of care discussion as below  At this time, Jose G Will, Does Not have capacity for medical decision-making.  Capacity is time limited and situation/question specific  During encounter Krish was surrogate medical decision-maker (updated by phone)  Outcome of goals of care meeting:  Continue current medical management  Continue Full Code  Code status Full Code  Advanced Directives: no POA or living will in [facility]  Surrogate/Legal NOK:  Krish Will (child) [phone]  Jayson Hoangamara (brother) 
  Palliative Care Department  [phone]  Progress Note  Provider: Juana Fernandes APRN-CNS      PATIENT: Jose G Will  : 1965  MRN: [id number]  ADMISSION DATE: 2024  7:18 AM  Referring Provider: Flash Graham MD     Palliative Medicine was consulted on hospital day 8 for assistance with Goals of care    HPI:     Clinical Summary:Jose G Will is a 59 y.o. y/o adult with a history of hypothyroidism, myasthenia gravis, depression, asthma, GERD, hyperlipidemia, hypertension, suicide 10 who presented to [facility] on 2024 with altered mental status.  She was admitted for possible right arm cellulitis and hypernatremia.  Patient's mental status was not improving and on  she was unable to protect her airway, she was intubated and transferred to the ICU.  Bronchoscopy found thick secretions.  Cultures were positive for Staph aureus and she continues on antibiotics for pneumonia.  She was also positive for parainfluenza.  CT of the head is negative.  EEG showed abnormal activity which could be from sedation or hypoxic ischemic encephalopathy.  She remains intubated and admitted to the ICU.    ASSESSMENT/PLAN:     Pertinent Hospital Diagnoses   Acute hypoxic respiratory failure  Staphylococcus aureus pneumonia  Parainfluenza  Acute metabolic encephalopathy  Myasthenia gravis  Hx of bipolar with depression    Palliative Care Encounter / Counseling Regarding Goals of Care  Please see detailed goals of care discussion as below  At this time, Jose G Will, Does Not have capacity for medical decision-making.  Capacity is time limited and situation/question specific  During encounter Krish was surrogate medical decision-maker (updated by phone)  Outcome of goals of care meeting:  Continue current medical management  Continue Full Code  Code status Full Code  Advanced Directives: no POA or living will in [facility]  Surrogate/Legal NOK:  Krish Will (child) [phone]  Jayson Hoangamara (brother) 
  [facility] Neurology follow up      Jose G Will is a 59 y.o.  adult     Neurology following for AMS     PMH of Myasthenia Gravis, HTN, HLD, Hypothyroidm,  Bipolar Disorder, and GERD     Admitted to [facility] on 6/2/2024 with presentation of altered mental status.  Patient had presented from [facility] with report that he had become more altered recently than normal.  She had been admitted to this facility due to report of an attempted suicide.  Patient had bruising on his back and buttocks, but nursing staff was unaware as to how this occurred.  Patient does have a history of picking at her skin with multiple scabbed areas over the upper and lower extremities reported.  She was only oriented x2.  Unclear what her previous baseline had been.  It was reported that she had a mumbling quality speech when first admitted to [facility].  Patient is wheelchair-bound at baseline due to MG?. Currently he is unable to care for himself appropriately.     ID following for MRSA pneumonia - on vancomycin ; cefepime was d/c     Nephrology following for hypernatremia     Review of medications indicates that patient is currently on Eliquis but no history of coagulopathy noted or A-fib.  Patient also on Sinemet and Cogentin with no history of parkinsonism/Parkinson's disease, or dyskinesia.  Patient is on Imuran daily but currently not on any Mestinon or other therapy for myasthenia.    CTH negative.  CT C spine negative.    EEG shows encephalopathy, no seizure activity    Pt lying in bed with no family at bedside. Remains intubated. No sedation at this time- propofol stopped 6/11 around 0934. Exam remains unchanged.     6/12MRI - unable to have questionnaire filled out. Discussed case with Dr. Serra - would like to have MRI completed to r/o acute neurologic insult and waiver will be filled out     6/13 too large for MRI scanner. Repeat CT head completed and unrevealing for acute findings. 
4 Eyes Skin Assessment     NAME:  Jose G Will  YOB: 1965  MEDICAL RECORD NUMBER:  [id number]    The patient is being assessed for  Admission    I agree that at least one RN has performed a thorough Head to Toe Skin Assessment on the patient. ALL assessment sites listed below have been assessed.      Areas assessed by both nurses:    Head, Face, Ears, Shoulders, Back, Chest, Arms, Elbows, Hands, Sacrum. Buttock, Coccyx, Ischium, Legs. Feet and Heels, and Under Medical Devices         Does the Patient have a Wound? No noted wound(s)       Rick Prevention initiated by RN: Yes  Wound Care Orders initiated by RN: Yes    Pressure Injury (Stage 3,4, Unstageable, DTI, NWPT, and Complex wounds) if present, place Wound referral order by RN under : No    New Ostomies, if present place, Ostomy referral order under : No     Nurse 1 eSignature: Electronically signed by Katie Vanegas RN on 6/2/24 at 6:53 PM EDT    **SHARE this note so that the co-signing nurse can place an eSignature**    Nurse 2 eSignature: Electronically signed by Kacey Mobley RN on 6/2/24 at 6:56 PM EDT   
4 Eyes Skin Assessment     NAME:  Jose G Will  YOB: 1965  MEDICAL RECORD NUMBER:  [id number]    The patient is being assessed for  Admission    I agree that at least one RN has performed a thorough Head to Toe Skin Assessment on the patient. ALL assessment sites listed below have been assessed.      Areas assessed by both nurses:    Head, Face, Ears, Shoulders, Back, Chest, Arms, Elbows, Hands, Sacrum. Buttock, Coccyx, Ischium, Legs. Feet and Heels, and Under Medical Devices         Does the Patient have a Wound? Yes wound(s) were present on assessment. LDA wound assessment was Initiated and completed by RN    Wounds put into chart       Rick Prevention initiated by RN: Yes  Wound Care Orders initiated by RN: Yes    Pressure Injury (Stage 3,4, Unstageable, DTI, NWPT, and Complex wounds) if present, place Wound referral order by RN under : No    New Ostomies, if present place, Ostomy referral order under : No     Nurse 1 eSignature: Electronically signed by Miguelangel Toussaint RN on 6/6/24 at 6:45 PM EDT    **SHARE this note so that the co-signing nurse can place an eSignature**    Nurse 2 eSignature: Electronically signed by Sandy Phelps RN on 6/6/24 at 6:46 PM EDT4 Eyes Skin Assessment     
After arriving back from LP and CT scan, patient's ETT and OG were noted to be out of place. Dr. Riley notified. Resp placed ETT from 27 to 24cm and OG back to 60. Stat xray placed to verify placement of lines.   
Attempted to place NG tube, but was unsuccessful. Pt was not cooperative and would not swallow.     Russ Aleman RN    
CRITICAL CARE   SERVICE DAILY PROGRESS  NOTE     6/12/2024   Hospital  LOS: 10 days      Admit date- 6/2/2024       Initially admitted for -   Altered Mental Status (From [facility]. Per facility pt more altered than usual. Hx myasthenia gravis, speech garbled but becomes more clear as day goes on. Per facility pt sat 91% room air. Pt has warm red area to right forearm. Multiple bruises all over body, bruising to back and buttocks)       Subjective:      Remains intubated and sedated   No overnight events   Hemodynamically stable     Review of Systems      Unable to obtain secondary to mental status. Pt is intubated and sedated                      Allergies:  Allergies   Allergen Reactions    Iodine     Lactose Intolerance (Gi)     Topiramate      Home Meds:  Prior to Admission medications    Medication Sig Start Date End Date Taking? Authorizing Provider   carbidopa-levodopa (SINEMET)  MG per tablet Take 1 tablet by mouth 3 times daily   Yes Barrera Diamond MD   gabapentin (NEURONTIN) 800 MG tablet Take 1 tablet by mouth 3 times daily.   Yes Barrera Diamond MD   oxyBUTYnin (DITROPAN) 5 MG tablet Take 1 tablet by mouth 2 times daily   Yes ProviderBarrera MD   atorvastatin (LIPITOR) 10 MG tablet Take 1 tablet by mouth nightly   Yes ProviderBarrera MD   propranolol (INDERAL) 10 MG tablet Take 1 tablet by mouth 2 times daily   Yes Barrera Diamond MD   amLODIPine (NORVASC) 2.5 MG tablet Take 1 tablet by mouth daily   Yes Barrera Diamond MD   famotidine (PEPCID) 20 MG tablet Take 1 tablet by mouth daily   Yes Barrera Diamond MD   pantoprazole (PROTONIX) 40 MG tablet Take 1 tablet by mouth daily   Yes ProviderBarrera MD   finasteride (PROSCAR) 5 MG tablet Take 1 tablet by mouth daily   Yes ProviderBarrera MD   medroxyPROGESTERone (PROVERA) 10 MG tablet Take 1 tablet by mouth daily   Yes ProviderBarrera MD   vitamin D (CHOLECALCIFEROL) 25 MCG (1000 UT) 
CRITICAL CARE   SERVICE DAILY PROGRESS  NOTE     6/15/2024   Hospital  LOS: 13 days      Admit date- 6/2/2024       Initially admitted for -   Altered Mental Status (From [facility]. Per facility pt more altered than usual. Hx myasthenia gravis, speech garbled but becomes more clear as day goes on. Per facility pt sat 91% room air. Pt has warm red area to right forearm. Multiple bruises all over body, bruising to back and buttocks)       Subjective:      Remains intubated and sedated   No overnight events   Hemodynamically stable     Review of Systems      Unable to obtain secondary to mental status. Pt is intubated and sedated                      Allergies:  Allergies   Allergen Reactions    Iodine     Lactose Intolerance (Gi)     Topiramate      Home Meds:  Prior to Admission medications    Medication Sig Start Date End Date Taking? Authorizing Provider   carbidopa-levodopa (SINEMET)  MG per tablet Take 1 tablet by mouth 3 times daily   Yes Barrera Diamond MD   gabapentin (NEURONTIN) 800 MG tablet Take 1 tablet by mouth 3 times daily.   Yes Barrera Diamond MD   oxyBUTYnin (DITROPAN) 5 MG tablet Take 1 tablet by mouth 2 times daily   Yes ProviderBarrera MD   atorvastatin (LIPITOR) 10 MG tablet Take 1 tablet by mouth nightly   Yes ProviderBarrera MD   propranolol (INDERAL) 10 MG tablet Take 1 tablet by mouth 2 times daily   Yes Barrera Diamond MD   amLODIPine (NORVASC) 2.5 MG tablet Take 1 tablet by mouth daily   Yes Barrera Diamond MD   famotidine (PEPCID) 20 MG tablet Take 1 tablet by mouth daily   Yes Barrera Diamond MD   pantoprazole (PROTONIX) 40 MG tablet Take 1 tablet by mouth daily   Yes ProviderBarrera MD   finasteride (PROSCAR) 5 MG tablet Take 1 tablet by mouth daily   Yes ProviderBarrera MD   medroxyPROGESTERone (PROVERA) 10 MG tablet Take 1 tablet by mouth daily   Yes ProviderBarrera MD   vitamin D (CHOLECALCIFEROL) 25 MCG (1000 UT) 
CRITICAL CARE   SERVICE DAILY PROGRESS  NOTE     6/16/2024   Hospital  LOS: 14 days      Admit date- 6/2/2024       Initially admitted for -   Altered Mental Status (From [facility]. Per facility pt more altered than usual. Hx myasthenia gravis, speech garbled but becomes more clear as day goes on. Per facility pt sat 91% room air. Pt has warm red area to right forearm. Multiple bruises all over body, bruising to back and buttocks)       Subjective:      Remains intubated and sedated   No overnight events   Hemodynamically stable     Review of Systems      Unable to obtain secondary to mental status. Pt is intubated and sedated                      Allergies:  Allergies   Allergen Reactions    Iodine     Lactose Intolerance (Gi)     Topiramate      Home Meds:  Prior to Admission medications    Medication Sig Start Date End Date Taking? Authorizing Provider   carbidopa-levodopa (SINEMET)  MG per tablet Take 1 tablet by mouth 3 times daily   Yes Barrera Diamond MD   gabapentin (NEURONTIN) 800 MG tablet Take 1 tablet by mouth 3 times daily.   Yes Barrera Diamond MD   oxyBUTYnin (DITROPAN) 5 MG tablet Take 1 tablet by mouth 2 times daily   Yes ProviderBarrera MD   atorvastatin (LIPITOR) 10 MG tablet Take 1 tablet by mouth nightly   Yes ProviderBarrera MD   propranolol (INDERAL) 10 MG tablet Take 1 tablet by mouth 2 times daily   Yes Barrera Diamond MD   amLODIPine (NORVASC) 2.5 MG tablet Take 1 tablet by mouth daily   Yes Barrera Diamond MD   famotidine (PEPCID) 20 MG tablet Take 1 tablet by mouth daily   Yes Barrera Diamond MD   pantoprazole (PROTONIX) 40 MG tablet Take 1 tablet by mouth daily   Yes ProviderBarrera MD   finasteride (PROSCAR) 5 MG tablet Take 1 tablet by mouth daily   Yes ProviderBarrera MD   medroxyPROGESTERone (PROVERA) 10 MG tablet Take 1 tablet by mouth daily   Yes ProviderBarrera MD   vitamin D (CHOLECALCIFEROL) 25 MCG (1000 UT) 
Chart reviewed.  Patient had tracheostomy and PEG tube placed this morning.  CODE STATUS has been established DNR CCA.  [facility], case management informed, he is % service-connected, patient is going to be evaluated post trach and PEG for ICU beds and availability to transfer.  Goals of care and CODE STATUS has been established.  No further PM needs has been identified.  Going to sign off for now.  Please reconsult if new PM needs arises.  
Comprehensive Nutrition Assessment    Type and Reason for Visit:  Initial, Consult (TF order and management)    Nutrition Recommendations/Plan:   Continue NPO. TF recs provided. Regimen at goal will meet 100% est calorie/pro needs.    Recommend:   Standard Formula w/ Fiber (Jevity 1.5) @55ml/hr + 1 pro mod once daily :   Will provide: 1320ml TV, 1980kcal, 84g pro, (2084 kcal, 110g pro total w/ pro mod x1) 1003ml free water. Flush per renal management.     Monitor for clinical signs of refeeding/replace electrolytes PRN.      Malnutrition Assessment:  Malnutrition Status:  At risk for malnutrition (Comment) (06/06/24 1001)    Context:  Acute Illness     Findings of the 6 clinical characteristics of malnutrition:  Energy Intake:  Mild decrease in energy intake (Comment)  Weight Loss:  Unable to assess (2/2 lack of wt hx on file)     Body Fat Loss:  Unable to assess (2/2 Parainfluenza ISO status)     Muscle Mass Loss:  Unable to assess (2/2 Parainfluenza ISO status)    Fluid Accumulation:  Unable to assess (2/2 Parainfluenza ISO status)     Strength:  Not Performed    Nutrition Assessment:    Pt. admitted for AMS in the setting of possible right arm cellulitis and hypernatremia. Noted +Parainfluenza infection. 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 PMHx of myasthenia gravis, Major depressive disorder/ROSY/PTSD/borderline personality disorder; Pt. reportedly currently residing at [facility] d/t suicide attempt. Pt. NPO. Will provide TF recs and monitor.    Nutrition Related Findings:    Disoriented x3, -I/O, hypernatremia, obses/soft abd, +BS, +2pitting edema, Wound Type: None       Current Nutrition Intake & Therapies:    Average Meal Intake: NPO  Average Supplements Intake: NPO  Diet NPO Exceptions are: Sips of Water with Meds    Anthropometric Measures:  Height: 182.9 cm (6' 0.01\")  Ideal Body Weight (IBW): 178 lbs (81 kg)    Admission Body Weight: 117.2 kg 
Comprehensive Nutrition Assessment    Type and Reason for Visit:  Reassess    Nutrition Recommendations/Plan:   Tube feeding recommendation to better help meet nutritional needs.    Recommend Diabetic (Glucerna 1.5) @65ml/hr to provide 1560ml, 2340 calories, 129g protein, 1184ml water, with flushes per physician.    Diabetic formula being recommended d/t elevated blood glucose levels.         Malnutrition Assessment:  Malnutrition Status:  At risk for malnutrition (Comment) (06/12/24 1244)    Context:  Acute Illness     Findings of the 6 clinical characteristics of malnutrition:  Energy Intake:  Mild decrease in energy intake (Comment) (TF not currently meeting needs)  Weight Loss:  Unable to assess (d/t lack of weight history)     Body Fat Loss:  Unable to assess (d/t MRSA/parainfluenza isolation)     Muscle Mass Loss:  Unable to assess (d/t MRSA/parainfluenza isolation)    Fluid Accumulation:  No significant fluid accumulation     Strength:  Not Performed    Nutrition Assessment:    Patient is currently NPO and receiving tube feedings at this time ; pt also currently intubated and sedated ; adm w/ AMS in the setting of possible right arm cellulitis and hypernatremia ; noted MRSA  pneumonia and parainfluenza virus infection ;  noted acute toxic metabolic encephalopathy and acute respiratory failure ; hx of myasthenia gravis/major depressive disorder/ROSY/PTSD/borderline personality disorder ; s/p bronchoscopy ; pt was admitted from [facility] (she had been admitted to this facility due to report of an attempted suicide) ; noted 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 
Department of Internal Medicine  Infectious Diseases  Progress  Note      C/C : Erythema arms    All above noted  Pt was transferred to MICU   Intubated   Afebrile           Current Facility-Administered Medications   Medication Dose Route Frequency Provider Last Rate Last Admin    lidocaine 1 % injection 5 mL  5 mL IntraDERmal Once Jcarlos Barraza MD        sodium chloride flush 0.9 % injection 5-40 mL  5-40 mL IntraVENous 2 times per day Jcarlos Barraza MD   10 mL at 06/06/24 0953    sodium chloride flush 0.9 % injection 5-40 mL  5-40 mL IntraVENous PRN Jcarlos Barraza MD        0.9 % sodium chloride infusion   IntraVENous PRN Jcarlos Barraza MD        heparin (PF) 100 UNIT/ML injection 100 Units  1 mL IntraVENous 2 times per day Jcarlos Barraza MD        heparin (PF) 100 UNIT/ML injection 100 Units  1 mL IntraCATHeter PRN Jcarlos Barraza MD        DESMOpressin (DDAVP) injection 2 mcg  2 mcg SubCUTAneous BID Nolan Lockhart MD   2 mcg at 06/06/24 0950    dextrose 5 % solution   IntraVENous Continuous Nolan Lockhart  mL/hr at 06/05/24 2308 New Bag at 06/05/24 2308    divalproex (DEPAKOTE SPRINKLE) DR capsule 125 mg  125 mg Oral 2 times per day Sandy Bangura APRN - CNP   125 mg at 06/06/24 0951    melatonin tablet 6 mg  6 mg Oral QPM Sandy Bangura APRN - CNP   6 mg at 06/05/24 2241    sodium chloride flush 0.9 % injection 5-40 mL  5-40 mL IntraVENous 2 times per day Carrier, Nova APRN - CNP   10 mL at 06/06/24 0953    sodium chloride flush 0.9 % injection 5-40 mL  5-40 mL IntraVENous PRN Carrier, Nova, APRN - CNP        0.9 % sodium chloride infusion   IntraVENous PRN Carrier, Nova, APRN - CNP        potassium chloride (KLOR-CON M) extended release tablet 40 mEq  40 mEq Oral PRN Carrier, Nova, APRN - CNP        Or    potassium bicarb-citric acid (EFFER-K) effervescent tablet 40 mEq  40 mEq Oral PRN Carrier, Nova, APRN - CNP        Or    potassium chloride 10 mEq/100 mL 
Department of Internal Medicine  Infectious Diseases  Progress  Note      C/C : Erythema arms    Pt is awake, non verbal, non communicating   No distress  Afebrile       Current Facility-Administered Medications   Medication Dose Route Frequency Provider Last Rate Last Admin    DESMOpressin (DDAVP) injection 2 mcg  2 mcg SubCUTAneous BID Nolan Lockhart MD        dextrose 5 % solution   IntraVENous Continuous Nolan Lockhart  mL/hr at 06/04/24 0224 New Bag at 06/04/24 0224    divalproex (DEPAKOTE SPRINKLE) DR capsule 125 mg  125 mg Oral 2 times per day Sandy Bangura APRN - CNP   125 mg at 06/04/24 1041    melatonin tablet 6 mg  6 mg Oral QPM Sandy Bangura APRN - CNP   6 mg at 06/03/24 2051    sodium chloride flush 0.9 % injection 5-40 mL  5-40 mL IntraVENous 2 times per day Carrier, Nova APRN - CNP   10 mL at 06/04/24 1041    sodium chloride flush 0.9 % injection 5-40 mL  5-40 mL IntraVENous PRN Carrier, MANJIT BhattN - CNP        0.9 % sodium chloride infusion   IntraVENous PRN Carrier, JANAE Bhatt - CNP        potassium chloride (KLOR-CON M) extended release tablet 40 mEq  40 mEq Oral PRN Carrier, NovaMANJIT breenN - CNP        Or    potassium bicarb-citric acid (EFFER-K) effervescent tablet 40 mEq  40 mEq Oral PRN Carrier, Nova, APRN - CNP        Or    potassium chloride 10 mEq/100 mL IVPB (Peripheral Line)  10 mEq IntraVENous PRN Carrier, MANJIT BhattN - CNP        magnesium sulfate 2000 mg in 50 mL IVPB premix  2,000 mg IntraVENous PRN Carrier, MANJIT BhattN - CNP        ondansetron (ZOFRAN-ODT) disintegrating tablet 4 mg  4 mg Oral Q8H PRN Carrier, NovaMANJIT breenN - CNP        Or    ondansetron (ZOFRAN) injection 4 mg  4 mg IntraVENous Q6H PRN Carrier, MANJIT BhattN - CNP        polyethylene glycol (GLYCOLAX) packet 17 g  17 g Oral Daily PRN Carrier, MANJIT BhattN - CNP        acetaminophen (TYLENOL) tablet 650 mg  650 mg Oral Q6H PRN Carrier, MANJIT BhattN - CNP        
Department of Internal Medicine  Infectious Diseases  Progress  Note      C/C : Erythema arms    Pt is lethargic  No distress       Current Facility-Administered Medications   Medication Dose Route Frequency Provider Last Rate Last Admin    lidocaine 1 % injection 5 mL  5 mL IntraDERmal Once Jcarlos Barraza MD        sodium chloride flush 0.9 % injection 5-40 mL  5-40 mL IntraVENous 2 times per day Jcarlos Barraza MD        sodium chloride flush 0.9 % injection 5-40 mL  5-40 mL IntraVENous PRN Jcarlos Barraza MD        0.9 % sodium chloride infusion   IntraVENous PRN Jcarlos Barraza MD        heparin (PF) 100 UNIT/ML injection 100 Units  1 mL IntraVENous 2 times per day Jcarlos Barraza MD        heparin (PF) 100 UNIT/ML injection 100 Units  1 mL IntraCATHeter PRN Jcarlos Barraza MD        DESMOpressin (DDAVP) injection 2 mcg  2 mcg SubCUTAneous BID Nolan Lockhart MD   2 mcg at 06/05/24 1030    dextrose 5 % solution   IntraVENous Continuous Nolan Lockhart  mL/hr at 06/05/24 0625 New Bag at 06/05/24 0625    divalproex (DEPAKOTE SPRINKLE) DR capsule 125 mg  125 mg Oral 2 times per day Sandy Bangura APRN - CNP   125 mg at 06/04/24 1041    melatonin tablet 6 mg  6 mg Oral QPM Sandy Bangura APRN - CNP   6 mg at 06/03/24 2051    sodium chloride flush 0.9 % injection 5-40 mL  5-40 mL IntraVENous 2 times per day Carrier, MANJIT BhattN - CNP   10 mL at 06/04/24 1041    sodium chloride flush 0.9 % injection 5-40 mL  5-40 mL IntraVENous PRN Carrier, Nova, APRN - CNP        0.9 % sodium chloride infusion   IntraVENous PRN Carrier, NovaMANJIT breenN - CNP        potassium chloride (KLOR-CON M) extended release tablet 40 mEq  40 mEq Oral PRN Carrier, Nova, APRN - CNP        Or    potassium bicarb-citric acid (EFFER-K) effervescent tablet 40 mEq  40 mEq Oral PRN Carrier, NovaMANJIT breenN - CNP        Or    potassium chloride 10 mEq/100 mL IVPB (Peripheral Line)  10 mEq IntraVENous PRN Carrier, 
Department of Internal Medicine  Infectious Diseases  Progress  Note      C/C : Pneumonia     Pt is intubated and sedated   Low grade temp   F       Current Facility-Administered Medications   Medication Dose Route Frequency Provider Last Rate Last Admin    DESMOpressin (DDAVP) injection 2 mcg  2 mcg IntraVENous BID Nolan Lockhart MD   2 mcg at 06/11/24 0929    QUEtiapine (SEROQUEL) tablet 200 mg  200 mg Oral BID Spike Conde MD   200 mg at 06/11/24 0917    valproate (DEPACON) 750 mg in sodium chloride 0.9 % 100 mL IVPB  750 mg IntraVENous Q12H Spike Conde MD   Stopped at 06/11/24 0129    lidocaine PF 1 % injection 5 mL  5 mL IntraDERmal Once Spike Conde MD        sodium chloride flush 0.9 % injection 5-40 mL  5-40 mL IntraVENous 2 times per day Spike Conde MD   10 mL at 06/11/24 0917    sodium chloride flush 0.9 % injection 5-40 mL  5-40 mL IntraVENous PRN Spike Conde MD        0.9 % sodium chloride infusion   IntraVENous PRN Spike Conde MD        heparin (PF) 100 UNIT/ML injection 300 Units  3 mL IntraVENous 2 times per day Spike Conde MD        heparin (PF) 100 UNIT/ML injection 300 Units  3 mL IntraVENous PRN Spike Conde MD        chlorhexidine (PERIDEX) 0.12 % solution 15 mL  15 mL Mouth/Throat BID Megan Tapia APRN - CNP   15 mL at 06/11/24 0919    Polyvinyl Alcohol-Povidone PF (REFRESH) 1.4-0.6 % ophthalmic solution 2 drop  2 drop Both Eyes Q4H Megan Tapia APRN - CNP   2 drop at 06/11/24 0919    pantoprazole (PROTONIX) 40 mg in sodium chloride (PF) 0.9 % 10 mL injection  40 mg IntraVENous Daily Megan Tapia APRN - CNP   40 mg at 06/11/24 0922    ipratropium 0.5 mg-albuterol 2.5 mg (DUONEB) nebulizer solution 1 Dose  1 Dose Inhalation Q4H PRN Maloney-Brown, Megan, APRN - CNP        vancomycin (VANCOCIN) 1,750 mg in sodium chloride 0.9 % 500 mL IVPB  15 mg/kg IntraVENous Q24H Vin Hartley MD   Stopped at 06/11/24 0711    propofol infusion  
Department of Internal Medicine  Infectious Diseases  Progress  Note      C/C : Pneumonia , resp failure     Pt is intubated   Low grade temp   F       Current Facility-Administered Medications   Medication Dose Route Frequency Provider Last Rate Last Admin    DESMOpressin (DDAVP) injection 10 mcg  10 mcg IntraVENous BID Nolan Lockhart MD   10 mcg at 06/13/24 0852    vancomycin (VANCOCIN) 1,750 mg in sodium chloride 0.9 % 500 mL IVPB  15 mg/kg IntraVENous Q12H Flash Graham  mL/hr at 06/13/24 0855 1,750 mg at 06/13/24 0855    docusate sodium (COLACE) capsule 100 mg  100 mg Oral Daily Spike Conde MD   100 mg at 06/13/24 0852    menthol-zinc oxide (CALMOSEPTINE) 0.44-20.6 % ointment   Topical BID PRN Spike Conde MD        hydrALAZINE (APRESOLINE) injection 10 mg  10 mg IntraVENous Q6H PRN Jaci Porter APRN - CNP   10 mg at 06/13/24 0821    QUEtiapine (SEROQUEL) tablet 200 mg  200 mg Oral BID Spike Conde MD   200 mg at 06/13/24 0853    valproate (DEPACON) 750 mg in sodium chloride 0.9 % 100 mL IVPB  750 mg IntraVENous Q12H Spike Conde MD   Stopped at 06/13/24 0122    lidocaine PF 1 % injection 5 mL  5 mL IntraDERmal Once Spike Conde MD        sodium chloride flush 0.9 % injection 5-40 mL  5-40 mL IntraVENous 2 times per day Spike Conde MD   10 mL at 06/11/24 2127    sodium chloride flush 0.9 % injection 5-40 mL  5-40 mL IntraVENous PRN Spike Conde MD        0.9 % sodium chloride infusion   IntraVENous PRN Spike Conde MD        heparin (PF) 100 UNIT/ML injection 300 Units  3 mL IntraVENous 2 times per day Spike Conde MD        heparin (PF) 100 UNIT/ML injection 300 Units  3 mL IntraVENous PRN Spike Conde MD        chlorhexidine (PERIDEX) 0.12 % solution 15 mL  15 mL Mouth/Throat BID Maloney-Brown, Megan, APRN - CNP   15 mL at 06/13/24 0821    Polyvinyl Alcohol-Povidone PF (REFRESH) 1.4-0.6 % ophthalmic solution 2 drop  2 drop Both Eyes Q4H Regina, 
Department of Internal Medicine  Infectious Diseases  Progress  Note      C/C : Pneumonia , resp failure     Pt is intubated   Temp        Current Facility-Administered Medications   Medication Dose Route Frequency Provider Last Rate Last Admin    [START ON 6/15/2024] amLODIPine (NORVASC) tablet 10 mg  10 mg Oral Daily Nolan Lockhart MD        DESMOpressin (DDAVP) injection 10 mcg  10 mcg IntraVENous BID Nolan Lcokhart MD   10 mcg at 06/14/24 0822    vancomycin (VANCOCIN) 1,750 mg in sodium chloride 0.9 % 500 mL IVPB  15 mg/kg IntraVENous Q12H Flash Graham MD   Stopped at 06/14/24 0813    docusate sodium (COLACE) capsule 100 mg  100 mg Oral Daily Spike Conde MD   100 mg at 06/14/24 1014    menthol-zinc oxide (CALMOSEPTINE) 0.44-20.6 % ointment   Topical BID PRN Spike Conde MD        hydrALAZINE (APRESOLINE) injection 10 mg  10 mg IntraVENous Q6H PRN Jaci Porter APRN - CNP   10 mg at 06/14/24 0109    QUEtiapine (SEROQUEL) tablet 200 mg  200 mg Oral BID Spike Conde MD   200 mg at 06/14/24 1015    valproate (DEPACON) 750 mg in sodium chloride 0.9 % 100 mL IVPB  750 mg IntraVENous Q12H Spike Conde MD   Stopped at 06/14/24 0233    lidocaine PF 1 % injection 5 mL  5 mL IntraDERmal Once Spike Conde MD        sodium chloride flush 0.9 % injection 5-40 mL  5-40 mL IntraVENous 2 times per day Spike Conde MD   10 mL at 06/13/24 1944    sodium chloride flush 0.9 % injection 5-40 mL  5-40 mL IntraVENous PRN Spike Conde MD        0.9 % sodium chloride infusion   IntraVENous PRN Spike Conde MD        heparin (PF) 100 UNIT/ML injection 300 Units  3 mL IntraVENous 2 times per day Spike Conde MD   300 Units at 06/13/24 1959    heparin (PF) 100 UNIT/ML injection 300 Units  3 mL IntraVENous PRN Spike Conde MD   300 Units at 06/13/24 1958    chlorhexidine (PERIDEX) 0.12 % solution 15 mL  15 mL Mouth/Throat BID Megan Tapia APRN - CNP   15 mL at 06/14/24 0807    
Department of Internal Medicine  Infectious Diseases  Progress  Note      C/C : Pneumonia , resp failure     Pt is intubated , unresponsive   Afebrile       Current Facility-Administered Medications   Medication Dose Route Frequency Provider Last Rate Last Admin    0.9 % sodium chloride infusion   IntraVENous PRN Megan Tapia APRN - CNP        docusate sodium (COLACE) 150 MG/15ML liquid 100 mg  100 mg Oral Daily Spike Conde MD   100 mg at 06/18/24 0749    sterile water injection             lidocaine-EPINEPHrine 1 %-1:[id number] injection 20 mL  20 mL IntraDERmal Once Jace Li,         midazolam PF (VERSED) injection 4 mg  4 mg IntraVENous See Admin Instructions Melinda Crawford DO        midazolam (VERSED) 100mg/100mL in NS infusion  1-10 mg/hr IntraVENous Continuous Hernando Riley MD 2 mL/hr at 06/18/24 0152 2 mg/hr at 06/18/24 0152    fentaNYL (SUBLIMAZE) 1,000 mcg in sodium chloride 0.9% 100 mL infusion   mcg/hr IntraVENous Continuous Hernando Riley MD 5 mL/hr at 06/18/24 0547 50 mcg/hr at 06/18/24 0547    morphine (PF) injection 2 mg  2 mg IntraVENous Q15 Min PRN Spike Conde MD        Or    morphine (PF) injection 2 mg  2 mg IntraVENous Q15 Min PRN Spike Conde MD        glycopyrrolate (ROBINUL) injection 0.2 mg  0.2 mg IntraVENous Q4H PRN Spike Conde MD        LORazepam (ATIVAN) injection 1 mg  1 mg IntraVENous Q4H PRN Spike Conde MD   1 mg at 06/16/24 0844    amLODIPine (NORVASC) tablet 10 mg  10 mg Oral Daily Nolan Lockhart MD   10 mg at 06/18/24 0750    sodium chloride flush 0.9 % injection 5-40 mL  5-40 mL IntraVENous 2 times per day Sommer Waldron PA   10 mL at 06/18/24 0752    sodium chloride flush 0.9 % injection 5-40 mL  5-40 mL IntraVENous PRN Sommer Waldron PA        0.9 % sodium chloride infusion   IntraVENous PRN Sommer Waldron PA        white petrolatum ointment   Topical BID Spike Conde MD   Given at 06/18/24 0756    And    
Department of Internal Medicine  Infectious Diseases  Progress  Note      C/C : Pneumonia , resp failure     Pt is intubated , unresponsive   Low grade temp  99-100F       Current Facility-Administered Medications   Medication Dose Route Frequency Provider Last Rate Last Admin    clog zapper kit 10 mL  10 mL PEG Tube Once Megan Tapia APRN - CNP        bumetanide (BUMEX) injection 2 mg  2 mg IntraVENous BID Jhon Valdez MD   2 mg at 06/19/24 1013    0.9 % sodium chloride infusion   IntraVENous PRN Megan Tapia APRN - CNP        docusate sodium (COLACE) 150 MG/15ML liquid 100 mg  100 mg Oral Daily Spike Conde MD   100 mg at 06/19/24 1012    lidocaine-EPINEPHrine 1 %-1:[id number] injection 20 mL  20 mL IntraDERmal Once Jace Li DO        fentaNYL (SUBLIMAZE) injection 100 mcg  100 mcg IntraVENous Q1H PRN Melinda Crawford DO   100 mcg at 06/18/24 1122    vancomycin (VANCOCIN) 1,000 mg in sodium chloride 0.9 % 250 mL IVPB ([id number])  1,000 mg IntraVENous Q12H Douglas Murdock  mL/hr at 06/19/24 1042 1,000 mg at 06/19/24 1042    Immune Globulin (Human) IV solution 30 g  30 g IntraVENous Daily Hernando Riley MD   Stopped at 06/18/24 1513    pantoprazole (PROTONIX) 40 mg in sodium chloride (PF) 0.9 % 10 mL injection  40 mg IntraVENous Q12H Opal Boland MD   40 mg at 06/19/24 1013    midazolam PF (VERSED) injection 4 mg  4 mg IntraVENous See Admin Instructions Melinda Crawford C, DO   4 mg at 06/18/24 1118    midazolam (VERSED) 100mg/100mL in NS infusion  1-10 mg/hr IntraVENous Continuous Hernando Riley MD 2 mL/hr at 06/19/24 0559 2 mg/hr at 06/19/24 0559    fentaNYL (SUBLIMAZE) 1,000 mcg in sodium chloride 0.9% 100 mL infusion   mcg/hr IntraVENous Continuous Hernando Riley MD 5 mL/hr at 06/19/24 0549 50 mcg/hr at 06/19/24 0549    morphine (PF) injection 2 mg  2 mg IntraVENous Q15 Min PRN Spike Conde MD        Or    morphine (PF) injection 2 mg  2 
Department of Internal Medicine  Infectious Diseases  Progress  Note      C/C : Pneumonia , resp failure     Pt is intubated , unresponsive   Temp down today       Current Facility-Administered Medications   Medication Dose Route Frequency Provider Last Rate Last Admin    clog zapper kit 10 mL  10 mL PEG Tube Once Megan Tapia APRN - CNP        hydrALAZINE (APRESOLINE) tablet 25 mg  25 mg PEG Tube TID Jhon Valdez MD   25 mg at 06/19/24 2118    0.9 % sodium chloride infusion   IntraVENous PRN Megan Tapia APRN - CNP        docusate sodium (COLACE) 150 MG/15ML liquid 100 mg  100 mg Oral Daily Spike Conde MD   100 mg at 06/19/24 1012    lidocaine-EPINEPHrine 1 %-1:[id number] injection 20 mL  20 mL IntraDERmal Once Jace Li DO        fentaNYL (SUBLIMAZE) injection 100 mcg  100 mcg IntraVENous Q1H PRN Melinda Crawford, DO   100 mcg at 06/18/24 1122    vancomycin (VANCOCIN) 1,000 mg in sodium chloride 0.9 % 250 mL IVPB ([id number])  1,000 mg IntraVENous Q12H Douglas Murdock MD   Stopped at 06/20/24 0040    Immune Globulin (Human) IV solution 30 g  30 g IntraVENous Daily Hernando Riley MD 71.4 mL/hr at 06/19/24 1240 30 g at 06/19/24 1240    pantoprazole (PROTONIX) 40 mg in sodium chloride (PF) 0.9 % 10 mL injection  40 mg IntraVENous Q12H Opal Boland MD   40 mg at 06/19/24 2117    midazolam PF (VERSED) injection 4 mg  4 mg IntraVENous See Admin Instructions Micki Crawfordino C, DO   4 mg at 06/18/24 1118    midazolam (VERSED) 100mg/100mL in NS infusion  1-10 mg/hr IntraVENous Continuous Hernando Riley MD   Stopped at 06/19/24 1028    fentaNYL (SUBLIMAZE) 1,000 mcg in sodium chloride 0.9% 100 mL infusion   mcg/hr IntraVENous Continuous Hernando Riley MD 5 mL/hr at 06/19/24 0549 50 mcg/hr at 06/19/24 0549    morphine (PF) injection 2 mg  2 mg IntraVENous Q15 Min PRN Spike Conde MD        Or    morphine (PF) injection 2 mg  2 mg IntraVENous Q15 Min PRN 
Dr Conde spoke to pt son Krish and pt brother Jayson and they would like to make pt DNR CC and remove ETT I also spoke with them and they wanted to withdraw care DNR CC They had no further questions and did not want to be present we are to call when pt expires  
EEG completed.  Report to follow.  Jennifer Prince    
EEG completed. Report to follow.  Caroline Oconnor 6/7/2024     
ETT was pulled 1 cm back per written order. ETT was being charted 24 cm at the lips, but was actually found to be at 23 cm at the lips. ETT was retracted and is now 22 cm at the lips.  
GENERAL SURGERY PROGRESS NOTE:    Pt seen and examined. No acute issues overnight. PEG in place at 3.5 cm with bumper easily rotated. Abdomen is soft, nontender, nondistended.    Trach site without issues, no signs of bleeding. Good TV. Trach care per MICU.    Okay for tube feeds and medications through PEG. Management per primary. Surgery will sign off.    Electronically signed by Melinda Crawford DO on 6/19/2024 at 6:21 AM   
I visited the patient, who was not able to communicate. He seemed to be resting well. I reached out to family by phone. They are handling the situation well, but do have concerns regarding his housing once he is dismissed from the hospital.    If you need additional support, you may reach out to us at [facility], x [phone].     Bandar Jamil; AGUSTINA Wolff     
Krish, son of patient was notified of patient [facility]. This nurse notified [facility] that the patients glasses were left at bedside and patient does not have glasses, unfortunately  family lives in [place] and are unable to  belonging. Charge nurse notified.   
[facility] signed off. Patient was tracking nurse and open [facility] to her name.  T Family was notify  and spoke with Sara and explain patient condition and what was going on. Sara stated that he wants to give \"her more time to see if she wakes up\". Code status was changed to DNR-CCA. Family would like more time to see if patient has any chance to wake up or even consider trach and peg option once he has a conversation with his aunts and uncles.  For now, code status is changed to DNR-CCA, no CPR or No defibrillation.   
[facility] notified of pt CC and withdrawing Ventilator  
MRI screening form needs filled out, thank you!  
Measured to large. Vented/contact/RN aware letting ordering doctor know.  
Michaela pratt served at this time d/t coccyx wound.   
NATALIA Zapata notified of psych consult via [facility]. Pt added to census  
Neurology inpatient progress note       Jose G Will is a 59 y.o.  adult     Neurology following for altered mental status    PMH: Myasthenia Gravis, HTN, HLD, Hypothyroidm, Bipolar Disorder, and GERD     Patient presented to the ER on 6/2/2024 from [facility] for altered mental status.  Patient had become more altered than normal.  \"She\" was admitted to that facility for reported attempted suicide.  Patient had bruising on her back and buttocks but nursing staff was unaware as to how this occurred.  Patient has a history of picking at her skin with multiple scabbed areas over upper and lower extremities.  She was only oriented x 2.  It is unclear of what her previous baseline was.  She had a mumbling quality speech when she was first admitted to [facility].  Patient is wheelchair-bound at baseline due to myasthenia gravis.  She is currently unable to take care of himself appropriately.    ID following for MRSA pneumonia, on vancomycin; cefepime was discontinued    Nephrology following for hyponatremia    CT head negative and CT C-spine negative; both in June    EEG on 6/7 shows encephalopathy without seizure activity    Unable to have MRI brain, waiver filled out however patient will not fit in MRI scanner.    Repeat CT head on 6/13 was negative for acute abnormalities.  Again repeat CT completed on 6/17 was also negative.    Status post lumbar puncture on 6/17--meningitis encephalitis panel negative; unrevealing other than protein elevation 52.9 and glucose 78    Currently awaiting trach and PEG    There is no family present at bedside currently    Tachycardic as well as intermittently hypertensive, remains on vent, off sedation    ROS is limited due to patient's condition      Objective:       BP (!) 150/80   Pulse 89   Temp 98.8 °F (37.1 °C) (Esophageal)   Resp 13   Ht 1.829 m (6')   Wt 119.1 kg (262 lb 9.1 oz)   SpO2 96%   BMI 35.61 kg/m²       General appearance: alert, lying in 
Neurology inpatient progress note       Jose G Will is a 59 y.o.  adult     Neurology following for altered mental status    PMH: Myasthenia Gravis, HTN, HLD, Hypothyroidm, Bipolar Disorder, and GERD     Patient presented to the ER on 6/2/2024 from [facility] for altered mental status.  Patient had become more altered than normal.  \"She\" was admitted to that facility for reported attempted suicide.  Patient had bruising on her back and buttocks but nursing staff was unaware as to how this occurred.  Patient has a history of picking at her skin with multiple scabbed areas over upper and lower extremities.  She was only oriented x 2.  It is unclear of what her previous baseline was.  She had a mumbling quality speech when she was first admitted to [facility].  Patient is wheelchair-bound at baseline due to myasthenia gravis.  She is currently unable to take care of himself appropriately.    ID following for MRSA pneumonia, on vancomycin; cefepime was discontinued    Nephrology following for hyponatremia    CT head negative and CT C-spine negative; both in June    EEG on 6/7 shows encephalopathy without seizure activity    Unable to have MRI brain, waiver filled out however patient will not fit in MRI scanner.    Repeat CT head on 6/13 was negative for acute abnormalities.  Again repeat CT completed on 6/17 was also negative.    Status post lumbar puncture on 6/17--meningitis encephalitis panel negative; unrevealing other than protein elevation 52.9 and glucose 78    Status post trach and PEG on 6/18    IVIG initiated on 6/18 to go for 5 days    There is no family present at bedside currently    Tachycardic as well as intermittently hypertensive, tachycardia, +fever; remains on vent, off sedation    ROS is limited due to patient's condition    Objective:       BP (!) 179/96   Pulse (!) 114   Temp 99 °F (37.2 °C) (Temporal)   Resp 28   Ht 1.829 m (6')   Wt 119.1 kg (262 lb 9.1 oz)   SpO2 
Neurology inpatient progress note       Jose G Will is a 59 y.o.  adult     Neurology following for altered mental status    PMH: Myasthenia Gravis, HTN, HLD, Hypothyroidm, Bipolar Disorder, and GERD     Patient presented to the ER on 6/2/2024 from [facility] for altered mental status.  Patient had become more altered than normal.  \"She\" was admitted to that facility for reported attempted suicide.  Patient had bruising on her back and buttocks but nursing staff was unaware as to how this occurred.  Patient has a history of picking at her skin with multiple scabbed areas over upper and lower extremities.  She was only oriented x 2.  It is unclear of what her previous baseline was.  She had a mumbling quality speech when she was first admitted to [facility] [place].  Patient is wheelchair-bound at baseline due to myasthenia gravis.  She is currently unable to take care of himself appropriately.    ID following for MRSA pneumonia, on vancomycin; cefepime was discontinued    Nephrology following for hyponatremia    CT head negative and CT C-spine negative; both in June    EEG on 6/7 shows encephalopathy without seizure activity    Unable to have MRI brain, waiver filled out however patient will not fit in MRI scanner.    Repeat CT head on 6/13 was negative for acute abnormalities.  Again repeat CT completed on 6/17 was also negative.    Status post lumbar puncture on 6/17--meningitis encephalitis panel negative; unrevealing other than protein elevation 52.9 and glucose 78    Status post trach and PEG on 6/18    IVIG initiated on 6/18 to go for 5 days    There is no family present at bedside currently    Tachycardic as well as intermittently hypertensive, tachycardia, +fever; remains on vent, off sedation    ROS is limited due to patient's condition    Objective:       BP (!) 163/71   Pulse 100   Temp 99.4 °F (37.4 °C) (Temporal)   Resp 28   Ht 1.829 m (6')   Wt 119.1 kg (262 lb 9.1 oz)   SpO2 
Notified Dr. Lockhart and Dr. Chavez sodium 171, chloride 136.  
Notified Dr. Lockhart consult for nephrology is STAT consult.  
Notified Dr. Lockhart of Na 165 and Chloride 132  
Notified Dr. Maria Milanes Marino that patient is unable to be redirected, aggressive with sitter, continues to attempt to get out of bed and pulled out IV site. Orders placed.   
Occupational Therapy  OT SESSION ATTEMPT     Date:2024  Patient Name: Jose G Will  MRN: [id number]  : 1965  [facility]: [facility]     Attempted OT session this date:    [x] unavailable due to other medical staff (respiratory) currently with pt   [] on hold per nursing staff   [] on hold per nursing staff secondary to lab / radiology results    [] declined treatment  this date due to ____.  Benefits of participation in therapy reviewed with pt.    [] off unit   [] Other:     Will reattempt OT eval at a later time.    Cara Panda, OTR/L #[id number]    
Orders given to consult nephrology per Dr. Murdock. Nephrology consult sent to Dr. Lockhart.  
Orders given to order d5 at 75 ml/hr per Dr. Chavez.  
PULMONARY  CRITICAL CARE   SERVICE DAILY PROGRESS  NOTE     6/8/2024   Hospital  LOS: 6 days      Admit date- 6/2/2024       Initially admitted for -   Altered Mental Status (From [facility]. Per facility pt more altered than usual. Hx myasthenia gravis, speech garbled but becomes more clear as day goes on. Per facility pt sat 91% room air. Pt has warm red area to right forearm. Multiple bruises all over body, bruising to back and buttocks)       Subjective:      Remains intubated and sedated   No overnight events   Has central access now   Hemodynamically stable   Discussed with ID     Review of Systems      Unable to obtain secondary to mental status. Pt is intubated and sedated                      Allergies:  Allergies   Allergen Reactions    Iodine     Lactose Intolerance (Gi)     Topiramate      Home Meds:  Prior to Admission medications    Medication Sig Start Date End Date Taking? Authorizing Provider   carbidopa-levodopa (SINEMET)  MG per tablet Take 1 tablet by mouth 3 times daily   Yes Barrera Diamond MD   gabapentin (NEURONTIN) 800 MG tablet Take 1 tablet by mouth 3 times daily.   Yes ProviderBarrera MD   oxyBUTYnin (DITROPAN) 5 MG tablet Take 1 tablet by mouth 2 times daily   Yes Provider, MD Barrera   atorvastatin (LIPITOR) 10 MG tablet Take 1 tablet by mouth nightly   Yes ProviderBarrera MD   propranolol (INDERAL) 10 MG tablet Take 1 tablet by mouth 2 times daily   Yes ProviderBarrera MD   amLODIPine (NORVASC) 2.5 MG tablet Take 1 tablet by mouth daily   Yes Provider, MD Barrera   famotidine (PEPCID) 20 MG tablet Take 1 tablet by mouth daily   Yes ProviderBarrera MD   pantoprazole (PROTONIX) 40 MG tablet Take 1 tablet by mouth daily   Yes ProviderBarrera MD   finasteride (PROSCAR) 5 MG tablet Take 1 tablet by mouth daily   Yes Provider, MD Barrera   medroxyPROGESTERone (PROVERA) 10 MG tablet Take 1 tablet by mouth daily   Yes ProviderBarrera 
PULMONARY  CRITICAL CARE   SERVICE DAILY PROGRESS  NOTE     6/9/2024   Hospital  LOS: 7 days      Admit date- 6/2/2024       Initially admitted for -   Altered Mental Status (From [facility]. Per facility pt more altered than usual. Hx myasthenia gravis, speech garbled but becomes more clear as day goes on. Per facility pt sat 91% room air. Pt has warm red area to right forearm. Multiple bruises all over body, bruising to back and buttocks)       Subjective:      Remains intubated and sedated   No overnight events   Hemodynamically stable     Review of Systems      Unable to obtain secondary to mental status. Pt is intubated and sedated                      Allergies:  Allergies   Allergen Reactions    Iodine     Lactose Intolerance (Gi)     Topiramate      Home Meds:  Prior to Admission medications    Medication Sig Start Date End Date Taking? Authorizing Provider   carbidopa-levodopa (SINEMET)  MG per tablet Take 1 tablet by mouth 3 times daily   Yes Barrera Diamond MD   gabapentin (NEURONTIN) 800 MG tablet Take 1 tablet by mouth 3 times daily.   Yes Barrera Diamond MD   oxyBUTYnin (DITROPAN) 5 MG tablet Take 1 tablet by mouth 2 times daily   Yes ProviderBarrera MD   atorvastatin (LIPITOR) 10 MG tablet Take 1 tablet by mouth nightly   Yes ProviderBarrera MD   propranolol (INDERAL) 10 MG tablet Take 1 tablet by mouth 2 times daily   Yes Barrera Diamond MD   amLODIPine (NORVASC) 2.5 MG tablet Take 1 tablet by mouth daily   Yes Barrera Diamond MD   famotidine (PEPCID) 20 MG tablet Take 1 tablet by mouth daily   Yes Barrera Diamond MD   pantoprazole (PROTONIX) 40 MG tablet Take 1 tablet by mouth daily   Yes ProviderBarrera MD   finasteride (PROSCAR) 5 MG tablet Take 1 tablet by mouth daily   Yes ProviderBarrera MD   medroxyPROGESTERone (PROVERA) 10 MG tablet Take 1 tablet by mouth daily   Yes ProviderBarrera MD   vitamin D (CHOLECALCIFEROL) 25 MCG 
Patient pulling lines and not direct able patient put In soft restraints for patient safety.   
Patient refusing to wear cardiac monitor. After multiple attempts to reapply, patient still pulling medical devices off.   
Perfect serve sent to Dr. Milanes pt has been combative, and ripped out several iv and sodium is still critical at 170    
Pharmacy Consultation Note  (Antibiotic Dosing and Monitoring)    Initial consult date: 6/18/24  Consulting physician/provider: Dr. Murdock  Drug: Vancomycin  Indication: CAP    Age/  Gender Height Weight IBW  Allergy Information   59 y.o./adult 182.9 cm (6') 117 kg (258 lb)     Ideal body weight: 77.6 kg (171 lb 1.2 oz)  Adjusted ideal body weight: 94.2 kg (207 lb 10.8 oz)   Iodine, Lactose intolerance (gi), and Topiramate      Renal Function:  Recent Labs     06/16/24  0359 06/17/24  0447 06/18/24  0439   BUN 20 24* 22   CREATININE 0.7 0.9 0.8         Intake/Output Summary (Last 24 hours) at 6/18/2024 1345  Last data filed at 6/18/2024 0816  Gross per 24 hour   Intake 1737.78 ml   Output 1250 ml   Net 487.78 ml         Vancomycin Monitoring:  Trough:    Recent Labs     06/17/24  0447 06/17/24  0855   VANCOTROUGH 30.6* 27.3*       Random:  No results for input(s): \"VANCORANDOM\" in the last 72 hours.      Vancomycin Administration Times:    Recent vancomycin administrations                     vancomycin (VANCOCIN) 1,000 mg in sodium chloride 0.9 % 250 mL IVPB ([id number]) (mg) 1,000 mg New Bag 06/18/24 1259    vancomycin (VANCOCIN) 1500 mg in sodium chloride 0.9 % 250 mL IVPB (mg) 1,500 mg New Bag 06/16/24 2007     1,500 mg New Bag  0901     1,500 mg New Bag 06/15/24 2217                  Assessment:  Patient is a 59 y.o. adult who has been initiated on vancomycin  Estimated Creatinine Clearance (based on SCr of 0.8 mg/dL)  Female: 107 mL/min  Male: 129 mL/min  To dose vancomycin, pharmacy will be utilizing  AUC/JONN 400-600 and trough goal of 15-20 mcg/mL  6/18: Scr 0.8    Plan:  Start vancomycin 1000 mg IV q 12 hr   Will check vancomycin random level tomorrow am   Will continue to monitor renal function   Pharmacy to follow    Thank you for the consult,    Yong Lincoln, PharmD, BCPS, BCCCP 6/18/2024 1:55 PM            
Pharmacy Consultation Note  (Antibiotic Dosing and Monitoring)    Initial consult date: 6/18/24  Consulting physician/provider: Dr. Murdock  Drug: Vancomycin  Indication: CAP    Age/  Gender Height Weight IBW  Allergy Information   59 y.o./adult 182.9 cm (6') 117 kg (258 lb)     Ideal body weight: 77.6 kg (171 lb 1.2 oz)  Adjusted ideal body weight: 94.2 kg (207 lb 10.8 oz)   Iodine, Lactose intolerance (gi), and Topiramate      Renal Function:  Recent Labs     06/17/24  0447 06/18/24  0439 06/19/24  0432   BUN 24* 22 19   CREATININE 0.9 0.8 0.7         Intake/Output Summary (Last 24 hours) at 6/19/2024 1230  Last data filed at 6/19/2024 1200  Gross per 24 hour   Intake 3301.62 ml   Output 3400 ml   Net -98.38 ml         Vancomycin Monitoring:  Trough:    Recent Labs     06/17/24  0447 06/17/24  0855   VANCOTROUGH 30.6* 27.3*       Random:    Recent Labs     06/19/24  0432   VANCORANDOM 8.2         Vancomycin Administration Times:    Recent vancomycin administrations                     vancomycin (VANCOCIN) 1,000 mg in sodium chloride 0.9 % 250 mL IVPB ([id number]) (mg) 1,000 mg New Bag 06/19/24 1042     1,000 mg New Bag 06/18/24 2332     1,000 mg New Bag  1259    vancomycin (VANCOCIN) 1500 mg in sodium chloride 0.9 % 250 mL IVPB (mg) 1,500 mg New Bag 06/16/24 2007                  Assessment:  Patient is a 59 y.o. adult who has been initiated on vancomycin  Estimated Creatinine Clearance (based on SCr of 0.7 mg/dL)  Female: 122 mL/min  Male: 148 mL/min  To dose vancomycin, pharmacy will be utilizing  AUC/JONN 400-600 and trough goal of 15-20 mcg/mL  6/18: Scr 0.8  6/19: Scr 0.7, random level this morning is 8.2 mcg/mL (~5 hours post dose)    Plan:  Continue vancomycin 1000 mg IV q 12 hr   Will check vancomycin random levels when appropriate    Will continue to monitor renal function   Pharmacy to follow    Thank you for the consult,    Yong Lincoln, Hanny, BCPS, BCCCP 6/19/2024 12:30 PM            
Pharmacy Consultation Note  (Antibiotic Dosing and Monitoring)    Initial consult date: 6/18/24  Consulting physician/provider: Dr. Murdock  Drug: Vancomycin  Indication: CAP    Age/  Gender Height Weight IBW  Allergy Information   59 y.o./adult 182.9 cm (6') 117 kg (258 lb)     Ideal body weight: 77.6 kg (171 lb 1.2 oz)  Adjusted ideal body weight: 94.2 kg (207 lb 10.8 oz)   Iodine, Lactose intolerance (gi), and Topiramate      Renal Function:  Recent Labs     06/18/24  0439 06/19/24  0432 06/20/24  0456   BUN 22 19 17   CREATININE 0.8 0.7 0.7         Intake/Output Summary (Last 24 hours) at 6/20/2024 1222  Last data filed at 6/20/2024 1133  Gross per 24 hour   Intake 3983.73 ml   Output 5400 ml   Net -1416.27 ml         Vancomycin Monitoring:  Trough:    No results for input(s): \"VANCOTROUGH\" in the last 72 hours.    Random:    Recent Labs     06/19/24  0432   VANCORANDOM 8.2           Vancomycin Administration Times:    Recent vancomycin administrations                     vancomycin (VANCOCIN) 1,000 mg in sodium chloride 0.9 % 250 mL IVPB ([id number]) (mg) 1,000 mg New Bag 06/20/24 1133     1,000 mg New Bag 06/19/24 2342     1,000 mg New Bag  1042     1,000 mg New Bag 06/18/24 2332     1,000 mg New Bag  1259                  Assessment:  Patient is a 59 y.o. adult who has been initiated on vancomycin  Estimated Creatinine Clearance (based on SCr of 0.7 mg/dL)  Female: 122 mL/min  Male: 148 mL/min  To dose vancomycin, pharmacy will be utilizing  AUC/JONN 400-600 and trough goal of 15-20 mcg/mL  6/18: Scr 0.8  6/19: Scr 0.7, random level this morning is 8.2 mcg/mL (~5 hours post dose)  6/20: Scr 0.7, UOP 2.4 mL/kg/hr    Plan:  Continue vancomycin 1000 mg IV q 12 hr   Will check vancomycin random levels when appropriate    Will continue to monitor renal function   Pharmacy to follow    Thank you for the consult,    Yong Lincoln, PharmD, BCPS, BCCCP 6/20/2024 12:22 PM            
Pharmacy Consultation Note  (Antibiotic Dosing and Monitoring)    Initial consult date: 6/7/24  Consulting physician/provider: Dr. Graham  Drug: Vancomycin  Indication: CAP    Age/  Gender Height Weight IBW  Allergy Information   59 y.o./adult 182.9 cm (6') 117 kg (258 lb)     Ideal body weight: 77.6 kg (171 lb 1.2 oz)  Adjusted ideal body weight: 94.2 kg (207 lb 10.8 oz)   Iodine, Lactose intolerance (gi), and Topiramate      Renal Function:  Recent Labs     06/10/24  0435 06/11/24  0407 06/12/24  0522   BUN 20 19 20   CREATININE 0.9 0.8 0.8         Intake/Output Summary (Last 24 hours) at 6/12/2024 1520  Last data filed at 6/12/2024 1400  Gross per 24 hour   Intake 2762.09 ml   Output 2665 ml   Net 97.09 ml         Vancomycin Monitoring:  Trough:  No results for input(s): \"VANCOTROUGH\" in the last 72 hours.  Random:    Recent Labs     06/12/24  0522   VANCORANDOM 6.5         Vancomycin Administration Times:    Recent vancomycin administrations                     vancomycin (VANCOCIN) 1,750 mg in sodium chloride 0.9 % 500 mL IVPB (mg) 1,750 mg New Bag 06/12/24 0829     1,750 mg New Bag 06/11/24 0511     1,750 mg New Bag 06/10/24 0545                  Assessment:  Patient is a 59 y.o. adult who has been initiated on vancomycin  Estimated Creatinine Clearance (based on SCr of 0.8 mg/dL)  Female: 107 mL/min  Male: 129 mL/min  To dose vancomycin, pharmacy will be utilizing  AUC/JONN 400-600 and trough goal of 15-20 mcg/mL  6/8: Scr 1.4, UOP 0.7 mL/kg/hr last 24 hours  6/9: Scr 1.1, UOP 1 mL/kg/hr last 24 hours, random level is 14.7 mcg/mL(~22 hours post dose)  6/10: Scr 0.9, UOP 0.6 mL/kg/hr  6/11: Scr 0.8, UOP 0.8 mL/kg/hr  6/12: Scr 0.8, UOP 1.1 mL/kg/hr, random level is 6.5 mcg/mL (~24 hours post dose)    Plan:  Change vancomycin to 1750 mg IV q 12 hr  Will check vancomycin level with next steady state  Will continue to monitor renal function   Pharmacy to follow    Thank you for the consult,    Yong Lincoln, 
Pharmacy Consultation Note  (Antibiotic Dosing and Monitoring)    Initial consult date: 6/7/24  Consulting physician/provider: Dr. Graham  Drug: Vancomycin  Indication: CAP    Age/  Gender Height Weight IBW  Allergy Information   59 y.o./adult 182.9 cm (6') 117 kg (258 lb)     Ideal body weight: 77.6 kg (171 lb 1.2 oz)  Adjusted ideal body weight: 94.2 kg (207 lb 10.8 oz)   Iodine, Lactose intolerance (gi), and Topiramate      Renal Function:  Recent Labs     06/12/24  0522 06/13/24  0501 06/14/24  0359   BUN 20 21 21   CREATININE 0.8 0.7 0.7         Intake/Output Summary (Last 24 hours) at 6/14/2024 0932  Last data filed at 6/14/2024 0900  Gross per 24 hour   Intake 3651.8 ml   Output 3575 ml   Net 76.8 ml         Vancomycin Monitoring:  Trough:    Recent Labs     06/14/24  0359   VANCOTROUGH 22.8*     Random:    Recent Labs     06/12/24  0522   VANCORANDOM 6.5         Vancomycin Administration Times:    Recent vancomycin administrations                     vancomycin (VANCOCIN) 1,750 mg in sodium chloride 0.9 % 500 mL IVPB (mg) 1,750 mg New Bag 06/14/24 0810     1,750 mg New Bag 06/13/24 2253     1,750 mg New Bag  0855     1,750 mg New Bag 06/12/24 2030    vancomycin (VANCOCIN) 1,750 mg in sodium chloride 0.9 % 500 mL IVPB (mg) 1,750 mg New Bag 06/12/24 0829                  Assessment:  Patient is a 59 y.o. adult who has been initiated on vancomycin  Estimated Creatinine Clearance (based on SCr of 0.7 mg/dL)  Female: 122 mL/min  Male: 148 mL/min  To dose vancomycin, pharmacy will be utilizing  AUC/JONN 400-600 and trough goal of 15-20 mcg/mL  6/8: Scr 1.4, UOP 0.7 mL/kg/hr last 24 hours  6/9: Scr 1.1, UOP 1 mL/kg/hr last 24 hours, random level is 14.7 mcg/mL(~22 hours post dose)  6/10: Scr 0.9, UOP 0.6 mL/kg/hr  6/11: Scr 0.8, UOP 0.8 mL/kg/hr  6/12: Scr 0.8, UOP 1.1 mL/kg/hr, random level is 6.5 mcg/mL (~24 hours post dose)  6/13: SCr: 0.7; UOP 0.7 ml/kg/hr  6/14: Scr 0.7, UOP 1.3 mL/kg/hr, random level this AM 
Pharmacy Consultation Note  (Antibiotic Dosing and Monitoring)    Initial consult date: 6/7/24  Consulting physician/provider: Dr. Graham  Drug: Vancomycin  Indication: CAP    Age/  Gender Height Weight IBW  Allergy Information   59 y.o./adult 182.9 cm (6') 117 kg (258 lb)     Ideal body weight: 77.6 kg (171 lb 1.2 oz)  Adjusted ideal body weight: 94.2 kg (207 lb 10.8 oz)   Iodine, Lactose intolerance (gi), and Topiramate      Renal Function:  Recent Labs     06/13/24  0501 06/14/24  0359 06/15/24  0516   BUN 21 21 23   CREATININE 0.7 0.7 0.7       Intake/Output Summary (Last 24 hours) at 6/15/2024 0824  Last data filed at 6/15/2024 0600  Gross per 24 hour   Intake 3356.51 ml   Output 1345 ml   Net 2011.51 ml       Vancomycin Monitoring:  Trough:    Recent Labs     06/14/24  0359 06/15/24  0720   VANCOTROUGH 22.8* 23.6*     Random:  No results for input(s): \"VANCORANDOM\" in the last 72 hours.      Vancomycin Administration Times:  Recent vancomycin administrations                     vancomycin (VANCOCIN) 1,750 mg in sodium chloride 0.9 % 500 mL IVPB (mg) 1,750 mg New Bag 06/14/24 2051     1,750 mg New Bag  0810     1,750 mg New Bag 06/13/24 2253     1,750 mg New Bag  0855     1,750 mg New Bag 06/12/24 2030    vancomycin (VANCOCIN) 1,750 mg in sodium chloride 0.9 % 500 mL IVPB (mg) 1,750 mg New Bag 06/12/24 0829                    Assessment:  Patient is a 59 y.o. adult who has been initiated on vancomycin  Estimated Creatinine Clearance (based on SCr of 0.7 mg/dL)  Female: 122 mL/min  Male: 148 mL/min  To dose vancomycin, pharmacy will be utilizing  AUC/JONN 400-600 and trough goal of 15-20 mcg/mL  6/15: Vanco trough 23.6 mCg/mL, Scr and UOP stable.     Plan:  Decrease to vancomycin 1500 mg IV q12hr  Will check vancomycin trough tomorrow @ 0800   Will continue to monitor renal function   Pharmacy to follow    Thank you for the consult,    Veronica Smith, PharmD, [facility]  PGY1 Pharmacy Resident   [phone]          
Pharmacy Consultation Note  (Antibiotic Dosing and Monitoring)    Initial consult date: 6/7/24  Consulting physician/provider: Dr. Graham  Drug: Vancomycin  Indication: CAP    Age/  Gender Height Weight IBW  Allergy Information   59 y.o./adult 182.9 cm (6') 117 kg (258 lb)     Ideal body weight: 77.6 kg (171 lb 1.2 oz)  Adjusted ideal body weight: 94.2 kg (207 lb 10.8 oz)   Iodine, Lactose intolerance (gi), and Topiramate      Renal Function:  Recent Labs     06/15/24  0516 06/15/24  1647 06/16/24  0359   BUN 23 21 20   CREATININE 0.7 0.7 0.7         Intake/Output Summary (Last 24 hours) at 6/16/2024 0741  Last data filed at 6/16/2024 0718  Gross per 24 hour   Intake 1601.04 ml   Output 2250 ml   Net -648.96 ml         Vancomycin Monitoring:  Trough:    Recent Labs     06/14/24  0359 06/15/24  0720   VANCOTROUGH 22.8* 23.6*       Random:  No results for input(s): \"VANCORANDOM\" in the last 72 hours.      Vancomycin Administration Times:  Recent vancomycin administrations                     vancomycin (VANCOCIN) 1500 mg in sodium chloride 0.9 % 250 mL IVPB (mg) 1,500 mg New Bag 06/15/24 2217    vancomycin (VANCOCIN) 1,750 mg in sodium chloride 0.9 % 500 mL IVPB ()  Restarted 06/15/24 0745     1,750 mg New Bag 06/14/24 2051     1,750 mg New Bag  0810     1,750 mg New Bag 06/13/24 2253     1,750 mg New Bag  0855                    Assessment:  Patient is a 59 y.o. adult who has been initiated on vancomycin  Estimated Creatinine Clearance (based on SCr of 0.7 mg/dL)  Female: 122 mL/min  Male: 148 mL/min  To dose vancomycin, pharmacy will be utilizing  AUC/JONN 400-600 and trough goal of 15-20 mcg/mL  6/15: Scr and UOP stable.     Plan:  Will continue vancomycin 1500 mg IV q12hr  Will check vancomycin trough tomorrow @ 0800   Will continue to monitor renal function   Pharmacy to follow    Thank you for the consult,    Veronica Smith, PharmD, [facility]  PGY1 Pharmacy Resident   [phone]          
Pharmacy Consultation Note  (Antibiotic Dosing and Monitoring)    Initial consult date: 6/7/24  Consulting physician/provider: Dr. Graham  Drug: Vancomycin  Indication: CAP    Age/  Gender Height Weight IBW  Allergy Information   59 y.o./adult 182.9 cm (6') 117 kg (258 lb)     Ideal body weight: 77.6 kg (171 lb 1.9 oz)  Adjusted ideal body weight: 92.9 kg (204 lb 11.2 oz)   Iodine, Lactose intolerance (gi), and Topiramate      Renal Function:  Recent Labs     06/08/24  1733 06/08/24  2154 06/09/24  0428   BUN 23 23 24*   CREATININE 1.2 1.1 1.1         Intake/Output Summary (Last 24 hours) at 6/9/2024 0911  Last data filed at 6/9/2024 0600  Gross per 24 hour   Intake 8546.26 ml   Output 2505 ml   Net 6041.26 ml         Vancomycin Monitoring:  Trough:  No results for input(s): \"VANCOTROUGH\" in the last 72 hours.  Random:    Recent Labs     06/09/24  0428   VANCORANDOM 14.7       Vancomycin Administration Times:    Recent vancomycin administrations                     vancomycin (VANCOCIN) 1,750 mg in sodium chloride 0.9 % 500 mL IVPB (mg) 1,750 mg New Bag 06/09/24 0559     1,750 mg New Bag 06/08/24 0623    vancomycin (VANCOCIN) 2,250 mg in sodium chloride 0.9 % 500 mL IVPB (mg) 2,250 mg New Bag 06/07/24 1703                  Assessment:  Patient is a 59 y.o. adult who has been initiated on vancomycin  Estimated Creatinine Clearance (based on SCr of 1.1 mg/dL)  Female: 76 mL/min  Male: 93 mL/min  To dose vancomycin, pharmacy will be utilizing  AUC/JONN 400-600 and trough goal of 15-20 mcg/mL  6/8: Scr 1.4, UOP 0.7 mL/kg/hr last 24 hours  6/9: Scr 1.1, UOP 1 mL/kg/hr last 24 hours, random level is 14.7 mcg/mL(~22 hours post dose)    Plan:  Continue vancomycin 1750 mg IV q 24 hr   Will check random vancomycin levels as needed to appropriately monitor   Will continue to monitor renal function   Pharmacy to follow    Thank you for the consult,    Yong Lincoln PharmD, BCPS, [facility] 6/9/2024 9:11 AM      
Pharmacy Consultation Note  (Antibiotic Dosing and Monitoring)    Initial consult date: 6/7/24  Consulting physician/provider: Dr. Graham  Drug: Vancomycin  Indication: CAP    Age/  Gender Height Weight IBW  Allergy Information   59 y.o./adult 182.9 cm (6') 117 kg (258 lb)     Ideal body weight: 77.6 kg (171 lb 1.9 oz)  Adjusted ideal body weight: 93.8 kg (206 lb 13.1 oz)   Iodine, Lactose intolerance (gi), and Topiramate      Renal Function:  Recent Labs     06/06/24  0802 06/06/24  1741 06/07/24  0420   BUN 11 16 23   CREATININE 1.3* 1.5* 1.7*       Intake/Output Summary (Last 24 hours) at 6/7/2024 1050  Last data filed at 6/7/2024 0700  Gross per 24 hour   Intake 8586.96 ml   Output 1390 ml   Net 7196.96 ml       Vancomycin Monitoring:  Trough:  No results for input(s): \"VANCOTROUGH\" in the last 72 hours.  Random:  No results for input(s): \"VANCORANDOM\" in the last 72 hours.    Vancomycin Administration Times:  Recent vancomycin administrations        No vancomycin IV orders with administrations found.            Orders not given:            vancomycin (VANCOCIN) 1,000 mg in sodium chloride 0.9 % 250 mL IVPB ([id number])                    Assessment:  Patient is a 59 y.o. adult who has been initiated on vancomycin  Estimated Creatinine Clearance (based on SCr of 1.7 mg/dL (H))  Female: 50 mL/min (A)  Male: 61 mL/min (A)  To dose vancomycin, pharmacy will be utilizing  AUC/JONN 400-600 and trough goal of 15-20 mcg/mL    Plan:  Vancomycin 2250 mg IV x 1 dose, followed by vancomycin 1750 mg IV q 24 hr   Will check vancomycin levels when appropriate  Will continue to monitor renal function   Pharmacy to follow      Yong Lincoln, Hanny, BCPS, BCCCP 6/7/2024 10:50 AM      
Pharmacy Consultation Note  (Antibiotic Dosing and Monitoring)    Initial consult date: 6/7/24  Consulting physician/provider: Dr. Graham  Drug: Vancomycin  Indication: CAP    Age/  Gender Height Weight IBW  Allergy Information   59 y.o./adult 182.9 cm (6') 117 kg (258 lb)     Ideal body weight: 77.6 kg (171 lb 1.9 oz)  Adjusted ideal body weight: 93.8 kg (206 lb 13.1 oz)   Iodine, Lactose intolerance (gi), and Topiramate      Renal Function:  Recent Labs     06/07/24  1723 06/07/24  2233 06/08/24  0421   BUN 28* 28* 27*   CREATININE 1.6* 1.6* 1.4*         Intake/Output Summary (Last 24 hours) at 6/8/2024 0857  Last data filed at 6/8/2024 0706  Gross per 24 hour   Intake 3382.25 ml   Output 2015 ml   Net 1367.25 ml         Vancomycin Monitoring:  Trough:  No results for input(s): \"VANCOTROUGH\" in the last 72 hours.  Random:  No results for input(s): \"VANCORANDOM\" in the last 72 hours.    Vancomycin Administration Times:    Recent vancomycin administrations                     vancomycin (VANCOCIN) 1,750 mg in sodium chloride 0.9 % 500 mL IVPB (mg) 1,750 mg New Bag 06/08/24 0623    vancomycin (VANCOCIN) 2,250 mg in sodium chloride 0.9 % 500 mL IVPB (mg) 2,250 mg New Bag 06/07/24 1703                  Assessment:  Patient is a 59 y.o. adult who has been initiated on vancomycin  Estimated Creatinine Clearance (based on SCr of 1.4 mg/dL (H))  Female: 61 mL/min (A)  Male: 74 mL/min (A)  To dose vancomycin, pharmacy will be utilizing  AUC/JONN 400-600 and trough goal of 15-20 mcg/mL  6/8: Scr 1.4, UOP 0.7 mL/kg/hr last 24 hours    Plan:  Continue vancomycin 1750 mg IV q 24 hr   Will check random vancomycin level tomorrow am   Will continue to monitor renal function   Pharmacy to follow    Thank you for the consult,    Yong Lincoln, Hanny, BCPS, BCCCP 6/8/2024 8:57 AM      
Pharmacy Consultation Note  (Antibiotic Dosing and Monitoring)    Initial consult date: 6/7/24  Consulting physician/provider: Dr. Graham  Drug: Vancomycin  Indication: CAP    Age/  Gender Height Weight IBW  Allergy Information   59 y.o./adult 182.9 cm (6') 117 kg (258 lb)     Ideal body weight: 77.6 kg (171 lb 1.9 oz)  Adjusted ideal body weight: 94.2 kg (207 lb 11.2 oz)   Iodine, Lactose intolerance (gi), and Topiramate      Renal Function:  Recent Labs     06/09/24  1000 06/09/24  2208 06/10/24  0435   BUN 21 20 20   CREATININE 1.0 0.9 0.9         Intake/Output Summary (Last 24 hours) at 6/10/2024 1548  Last data filed at 6/10/2024 1450  Gross per 24 hour   Intake 2993 ml   Output 2525 ml   Net 468 ml         Vancomycin Monitoring:  Trough:  No results for input(s): \"VANCOTROUGH\" in the last 72 hours.  Random:    Recent Labs     06/09/24  0428   VANCORANDOM 14.7         Vancomycin Administration Times:    Recent vancomycin administrations                     vancomycin (VANCOCIN) 1,750 mg in sodium chloride 0.9 % 500 mL IVPB (mg) 1,750 mg New Bag 06/10/24 0545     1,750 mg New Bag 06/09/24 0559     1,750 mg New Bag 06/08/24 0623    vancomycin (VANCOCIN) 2,250 mg in sodium chloride 0.9 % 500 mL IVPB (mg) 2,250 mg New Bag 06/07/24 1703                  Assessment:  Patient is a 59 y.o. adult who has been initiated on vancomycin  Estimated Creatinine Clearance (based on SCr of 0.9 mg/dL)  Female: 95 mL/min  Male: 115 mL/min  To dose vancomycin, pharmacy will be utilizing  AUC/JONN 400-600 and trough goal of 15-20 mcg/mL  6/8: Scr 1.4, UOP 0.7 mL/kg/hr last 24 hours  6/9: Scr 1.1, UOP 1 mL/kg/hr last 24 hours, random level is 14.7 mcg/mL(~22 hours post dose)  6/10: Scr 0.9, UOP 0.6 mL/kg/hr    Plan:  Continue vancomycin 1750 mg IV q 24 hr   Will check random vancomycin levels as needed to appropriately monitor   Will continue to monitor renal function   Pharmacy to follow    Thank you for the consult,    Yong 
Pharmacy Consultation Note  (Antibiotic Dosing and Monitoring)    Initial consult date: 6/7/24  Consulting physician/provider: Dr. Graham  Drug: Vancomycin  Indication: CAP    Age/  Gender Height Weight IBW  Allergy Information   59 y.o./adult 182.9 cm (6') 117 kg (258 lb)     Ideal body weight: 77.6 kg (171 lb 1.9 oz)  Adjusted ideal body weight: 94.2 kg (207 lb 11.2 oz)   Iodine, Lactose intolerance (gi), and Topiramate      Renal Function:  Recent Labs     06/09/24  2208 06/10/24  0435 06/11/24  0407   BUN 20 20 19   CREATININE 0.9 0.9 0.8         Intake/Output Summary (Last 24 hours) at 6/11/2024 1145  Last data filed at 6/11/2024 1100  Gross per 24 hour   Intake 4249.15 ml   Output 2190 ml   Net 2059.15 ml         Vancomycin Monitoring:  Trough:  No results for input(s): \"VANCOTROUGH\" in the last 72 hours.  Random:    Recent Labs     06/09/24  0428   VANCORANDOM 14.7         Vancomycin Administration Times:    Recent vancomycin administrations                     vancomycin (VANCOCIN) 1,750 mg in sodium chloride 0.9 % 500 mL IVPB (mg) 1,750 mg New Bag 06/11/24 0511     1,750 mg New Bag 06/10/24 0545     1,750 mg New Bag 06/09/24 0559                  Assessment:  Patient is a 59 y.o. adult who has been initiated on vancomycin  Estimated Creatinine Clearance (based on SCr of 0.8 mg/dL)  Female: 107 mL/min  Male: 129 mL/min  To dose vancomycin, pharmacy will be utilizing  AUC/JONN 400-600 and trough goal of 15-20 mcg/mL  6/8: Scr 1.4, UOP 0.7 mL/kg/hr last 24 hours  6/9: Scr 1.1, UOP 1 mL/kg/hr last 24 hours, random level is 14.7 mcg/mL(~22 hours post dose)  6/10: Scr 0.9, UOP 0.6 mL/kg/hr  6/11: Scr 0.8, UOP 0.8 mL/kg/hr    Plan:  Continue vancomycin 1750 mg IV q 24 hr   Will check random vancomycin level tomorrow am   Will continue to monitor renal function   Pharmacy to follow    Thank you for the consult,    Yong Lincoln, PharmD, BCPS, BCCCP 6/11/2024 11:45 AM      
Pharmacy Consultation Note  (Antibiotic Dosing and Monitoring)    Initial consult date: 6/7/24  Consulting physician/provider: Dr. Graham  Drug: Vancomycin  Indication: CAP    Vancomycin has been discontinued   Clinical Pharmacy to sign-off  Physician to re-consult pharmacy if future dosing is needed    Thank you for the consult,    Yong Lincoln, PharmD, BCPS, BCCCP 6/17/2024 11:21 AM            
Physical Therapy    Pt had change in status requiring transfer to intensive care with intubation.  Pt currently not appropriate for PT services this date.  Will need re-evaluation when appropriate.      Dajuan Toledo, PT, DPT  [id number]     
Physical Therapy    Pt in need of re-evaluation after change in status requiring transfer to intensive care with intubation.  Pt currently not appropriate for PT services this date.  Will need re-evaluation when appropriate.      Dajuan Toledo, PT, DPT  [id number]     
Physical Therapy    Pt in need of re-evaluation after change in status requiring transfer to intensive care with intubation.  Pt currently not appropriate for PT services this date.  Will need re-evaluation when appropriate.      aDjuan Toledo, PT, DPT  [id number]     
Physical Therapy    Pt on PT caseload for re-evaluation.  Pt not currently appropriate for PT services following multiple attempts.  PT will sign off for now, please re-consult when needed.  Thank you.     Dajuan Toledo, PT, DPT  [id number]     
Physical Therapy Initial Evaluation    Name: Jose G Will  : 1965  MRN: [id number]      Date of Service: 2024    Evaluating PT:  Puneet Cooper, PT [id number]    Referring provider/PT Order:  PT Eval and Treat   24 134  PT eval and treat  Start:  24 1345,   End:  24 134,   ONE TIME,   Standing Count:  1 Occurrences,   R         Jocy Chavez MD     Room #:  8512/8512-A  Diagnosis:  Cellulitis and abscess of leg [L03.119, L02.419]  Disorientation [R41.0]  Pain in both upper extremities [M79.601, M79.602]  Altered mental status, unspecified altered mental status type [R41.82]  AMS (altered mental status) [R41.82]  PMHx/PSHx:     has no past medical history on file.    has no past surgical history on file.     Procedure/Surgery:  none  Precautions:  Falls, contact isolation Parainfluenza , FWB (full weight bearing) skin integrity  Equipment Needs: To be determined,    SUBJECTIVE:    Patient admitted from [facility]. Unknown previous functional level. Equipment owned: unknown,      OBJECTIVE:   Initial Evaluation  Date: 24 Treatment Short Term/ Long Term   Goals   AM-PAC 6 Clicks      Was pt agreeable to Eval/treatment? Yes      Does pt have pain? yes     Bed Mobility  Rolling: Max x 2  Supine to sit:   NT   Sit to supine: NT   Scooting: NT   Rolling: Mod   Supine to sit: Mod   Sit to supine: Mod   Scooting: Mod    Transfers Sit to stand: NT   Stand to sit: NT  Stand pivot: NT   Sit to stand: TBD   Stand to sit: TBD   Stand pivot: TBD    Ambulation    NT  TBD   Stair negotiation: ascended and descended  NT       Strength/ROM:   See OT note for BUE ROM and strength  RLE grossly Unable to assess  LLE grossly Unable to assess.  LE AROM WFL  LLE AROM WFL    Balance:     Static Sitting: NT  Dynamic Sitting: NT  Static Standing NT   Dynamic Standing: NT       Patient is Alert & Oriented x person and follows one step directions squeezed hand upon request  Sensation:  Pt denies numbness 
Placed back on AC due to increased RR greater then 35  
Please call CT at [phone] when the patient is able to have exam.  
Power picc line  Placement 6/12/2024    Product number: [id number]   Lot Number: [id number]      Ultrasound: yes   Right brachial vein:                Upper Arm Circumference: (CM) 49cm    Size:(FR)/GUAGE 5.5frdl    Exposed Length: (CM) 4cm    Internal Length: (CM) 41cm   Cut: (CM) 10cm   Vein Measurement: 0.59cm              Lidocaine Given: yes 1%  Tasneem Romero RN  6/12/2024  1:29 PM    Power picc line placed with vps tip conformation. Tip in lower 1/3 svc/caj Ellis                   
Pt continues to reach for lines and tubes despite attempts to deter.  Restraints continued for pt safety. Deonna Grider RN  
Pt continues to reach for lines and tubes despite attempts to deter.  Restraints continued for pt safety. Julia Weir RN  
Pt continues to reach for lines and tubes despite attempts to deter.  Restraints continued for pt safety. Julia Weir RN  
Restraint type: Soft restraint:  right wrist and left wrist  Reason for restraints: Pulling out devices:  Unable to follow directions/instructions  Duration: 24 hours    Restraints must be removed when an alternative is available and effective and/or patient no longer meets criteria.    Orders must be renewed every calendar day or when discontinued.    The MD must conduct a face to face assessment within 1 calendar day of initiation when initial restraint order is verbal.   
Secure message sent to Attending on as not able to get patient to take PO meds.  
[facility]  Nephrology Progress Note    Patient's Name: Jose G Will    History of Present Illness from 6/3 Consult Note:    \" the pt is a 58 yo male who goes as a female with a pmh of hld, gerd, hypothyroidm BPD, anxiety, depression who was sent in from [facility] for ms changes. They have been on lithium. History is obtained from the chart as pt is not able to speak. 3 RNS are in the room drawing blood and holding them down. An iv was just placed so no ivf were going since last night. They are moaning. Labs show na 148 on 6/2 and 167 this morning with repeat of 171, k 3.5, co2 19, bun 7, cr 1.1, ca 10.3, alb 3. Nh4 29, mg 2.5, cortisol 12.3, uds negative, wbc 6.7, hgb 10.2, plt 353, Li level 0.7. resp panel shows parainfluenza virus. Ua shows sg of <1.005. head ct, ct cervical spine, and cxr show no abnormality. Vitals show temp 98.4, rr 20, hr 96, bp 155/87. Uo is 1.7 last shift and was 3.1 overnight. \"    Subjective:    6/6: Patient was seen and examined.  Transferred to the ICU today, intubated, eyes closed.    6/7: pt intubated, pure wick in place, on TF    6/8: pt seen and examined, intubated on tube feeds, free water increased today    6/9: pt seen and examined, remains intubated, on tube feeds    6/10: pt seen in icu, intubated, on tube feeds, suzie icu attending    6/11: pt seen and examined in icu, intubated and sedated. , on tube feeds, uo high, hasn't been all recorded despite having suzie matute icu RN    6/12: pt seen and examined, intubated and sedated in icu.     6/13: pt seen in icu, intubated. Off sedation, on tube feeds    6/14: pt seen in icu, remains intubated, tube feed running, suzie icu RN    6/15: mental status essentially unchanged; no other acute isssues from overnight; RN reports he is more agitated today    6/16: No new acute issues from overnight; not requiring pressors      Patient Active Problem List   Diagnosis    AMS (altered mental status)    Cellulitis    Hypernatremia    HLD 
[facility]  Nephrology Progress Note    Patient's Name: Jose G Will    History of Present Illness from 6/3 Consult Note:    \" the pt is a 58 yo male who goes as a female with a pmh of hld, gerd, hypothyroidm BPD, anxiety, depression who was sent in from [facility] for ms changes. They have been on lithium. History is obtained from the chart as pt is not able to speak. 3 RNS are in the room drawing blood and holding them down. An iv was just placed so no ivf were going since last night. They are moaning. Labs show na 148 on 6/2 and 167 this morning with repeat of 171, k 3.5, co2 19, bun 7, cr 1.1, ca 10.3, alb 3. Nh4 29, mg 2.5, cortisol 12.3, uds negative, wbc 6.7, hgb 10.2, plt 353, Li level 0.7. resp panel shows parainfluenza virus. Ua shows sg of <1.005. head ct, ct cervical spine, and cxr show no abnormality. Vitals show temp 98.4, rr 20, hr 96, bp 155/87. Uo is 1.7 last shift and was 3.1 overnight. \"    Subjective:    6/6: Patient was seen and examined.  Transferred to the ICU today, intubated, eyes closed.    6/7: pt intubated, pure wick in place, on TF    6/8: pt seen and examined, intubated on tube feeds, free water increased today    6/9: pt seen and examined, remains intubated, on tube feeds    6/10: pt seen in icu, intubated, on tube feeds, suzie icu attending    6/11: pt seen and examined in icu, intubated and sedated. , on tube feeds, uo high, hasn't been all recorded despite having suzie matute icu RN    6/12: pt seen and examined, intubated and sedated in icu.     6/13: pt seen in icu, intubated. Off sedation, on tube feeds    6/14: pt seen in icu, remains intubated, tube feed running, suzie icu RN    6/15: mental status essentially unchanged; no other acute isssues from overnight; RN reports he is more agitated today    6/16: No new acute issues from overnight; not requiring pressors    6/17: Patient scheduled to have an LP; possible trach and PEG today; no other acute events overnight; remains 
[facility]  Nephrology Progress Note    Patient's Name: Jose G Will    History of Present Illness from 6/3 Consult Note:    \" the pt is a 58 yo male who goes as a female with a pmh of hld, gerd, hypothyroidm BPD, anxiety, depression who was sent in from [facility] for ms changes. They have been on lithium. History is obtained from the chart as pt is not able to speak. 3 RNS are in the room drawing blood and holding them down. An iv was just placed so no ivf were going since last night. They are moaning. Labs show na 148 on 6/2 and 167 this morning with repeat of 171, k 3.5, co2 19, bun 7, cr 1.1, ca 10.3, alb 3. Nh4 29, mg 2.5, cortisol 12.3, uds negative, wbc 6.7, hgb 10.2, plt 353, Li level 0.7. resp panel shows parainfluenza virus. Ua shows sg of <1.005. head ct, ct cervical spine, and cxr show no abnormality. Vitals show temp 98.4, rr 20, hr 96, bp 155/87. Uo is 1.7 last shift and was 3.1 overnight. \"    Subjective:    6/15: mental status essentially unchanged; no other acute isssues from overnight; RN reports he is more agitated today    6/16: No new acute issues from overnight; not requiring pressors    6/17: Patient scheduled to have an LP; possible trach and PEG today; no other acute events overnight; remains intubated    6/18: Possible trach and PEG today; IVIG being considered    6/19: trach/PEG yesterday; no acute events from overnight      Patient Active Problem List   Diagnosis    AMS (altered mental status)    Cellulitis    Hypernatremia    HLD (hyperlipidemia)    GERD (gastroesophageal reflux disease)    Asthma    Hypothyroid    Depression    Anxiety    Acute respiratory failure (HCC)    Palliative care encounter    Goals of care, counseling/discussion       Diet:    Diet NPO Exceptions are: Sips of Water with Meds  ADULT TUBE FEEDING; Nasogastric; Standard with Fiber; Continuous; 20; Yes; 10; Q 4 hours; 55; 400; Q 6 hours; Protein; 1 Dose; Daily    Meds:     clog zapper  10 mL PEG Tube 
[facility]  Nephrology Progress Note    Patient's Name: Jose G Will    History of Present Illness from 6/3 Consult Note:    \" the pt is a 58 yo male who goes as a female with a pmh of hld, gerd, hypothyroidm BPD, anxiety, depression who was sent in from generations for ms changes. They have been on lithium. History is obtained from the chart as pt is not able to speak. 3 RNS are in the room drawing blood and holding them down. An iv was just placed so no ivf were going since last night. They are moaning. Labs show na 148 on 6/2 and 167 this morning with repeat of 171, k 3.5, co2 19, bun 7, cr 1.1, ca 10.3, alb 3. Nh4 29, mg 2.5, cortisol 12.3, uds negative, wbc 6.7, hgb 10.2, plt 353, Li level 0.7. resp panel shows parainfluenza virus. Ua shows sg of <1.005. head ct, ct cervical spine, and cxr show no abnormality. Vitals show temp 98.4, rr 20, hr 96, bp 155/87. Uo is 1.7 last shift and was 3.1 overnight. \"    Subjective:    6/15: mental status essentially unchanged; no other acute isssues from overnight; RN reports he is more agitated today    6/16: No new acute issues from overnight; not requiring pressors    6/17: Patient scheduled to have an LP; possible trach and PEG today; no other acute events overnight; remains intubated    6/18: Possible trach and PEG today; IVIG being considered    6/19: trach/PEG yesterday; no acute events from overnight    6/20: Remains intubated/trached; no other acute issues from overnight; IVIG in progress has received 3 doses; sodium level elevated at 155      Patient Active Problem List   Diagnosis    AMS (altered mental status)    Cellulitis    Hypernatremia    HLD (hyperlipidemia)    GERD (gastroesophageal reflux disease)    Asthma    Hypothyroid    Depression    Anxiety    Acute respiratory failure (HCC)    Palliative care encounter    Goals of care, counseling/discussion    Hx of myasthenia gravis       Diet:    Diet NPO Exceptions are: Sips of Water with Meds  ADULT 
[facility]  Nephrology Progress Note    Patient's Name: Jose G Will    History of Present Illness from 6/3 Consult Note:    \" the pt is a 58 yo male who goes as a female with a pmh of hld, gerd, hypothyroidm BPD, anxiety, depression who was sent in from generations for ms changes. They have been on lithium. History is obtained from the chart as pt is not able to speak. 3 RNS are in the room drawing blood and holding them down. An iv was just placed so no ivf were going since last night. They are moaning. Labs show na 148 on 6/2 and 167 this morning with repeat of 171, k 3.5, co2 19, bun 7, cr 1.1, ca 10.3, alb 3. Nh4 29, mg 2.5, cortisol 12.3, uds negative, wbc 6.7, hgb 10.2, plt 353, Li level 0.7. resp panel shows parainfluenza virus. Ua shows sg of <1.005. head ct, ct cervical spine, and cxr show no abnormality. Vitals show temp 98.4, rr 20, hr 96, bp 155/87. Uo is 1.7 last shift and was 3.1 overnight. \"    Subjective:    6/4: Patient was seen and examined- Awake, moaning, not answering questions at this time.    PMH:    History reviewed. No pertinent past medical history.    Patient Active Problem List   Diagnosis    AMS (altered mental status)    Cellulitis    Hypernatremia    HLD (hyperlipidemia)    GERD (gastroesophageal reflux disease)    Asthma    Hypothyroid    Depression    Anxiety       Diet:    Diet NPO Exceptions are: Sips of Water with Meds    Meds:     DESMOpressin  2 mcg SubCUTAneous BID    divalproex  125 mg Oral 2 times per day    melatonin  6 mg Oral QPM    sodium chloride flush  5-40 mL IntraVENous 2 times per day    amLODIPine  2.5 mg Oral Daily    apixaban  2.5 mg Oral BID    atorvastatin  10 mg Oral QHS    carbidopa-levodopa  1 tablet Oral TID    famotidine  20 mg Oral Daily    finasteride  5 mg Oral Daily    [Held by provider] gabapentin  800 mg Oral TID    medroxyPROGESTERone  10 mg Oral Daily    oxyBUTYnin  5 mg Oral BID    pantoprazole  40 mg Oral Daily    propranolol  10 mg 
[facility]  Nephrology Progress Note    Patient's Name: Jose G Will    History of Present Illness from 6/3 Consult Note:    \" the pt is a 58 yo male who goes as a female with a pmh of hld, gerd, hypothyroidm BPD, anxiety, depression who was sent in from generations for ms changes. They have been on lithium. History is obtained from the chart as pt is not able to speak. 3 RNS are in the room drawing blood and holding them down. An iv was just placed so no ivf were going since last night. They are moaning. Labs show na 148 on 6/2 and 167 this morning with repeat of 171, k 3.5, co2 19, bun 7, cr 1.1, ca 10.3, alb 3. Nh4 29, mg 2.5, cortisol 12.3, uds negative, wbc 6.7, hgb 10.2, plt 353, Li level 0.7. resp panel shows parainfluenza virus. Ua shows sg of <1.005. head ct, ct cervical spine, and cxr show no abnormality. Vitals show temp 98.4, rr 20, hr 96, bp 155/87. Uo is 1.7 last shift and was 3.1 overnight. \"    Subjective:    6/6: Patient was seen and examined.  Transferred to the ICU today, intubated, eyes closed.    6/7: pt intubated, pure wick in place, on TF    6/8: pt seen and examined, intubated on tube feeds, free water increased today    6/9: pt seen and examined, remains intubated, on tube feeds    6/10: pt seen in icu, intubated, on tube feeds, suzie icu attending    6/11: pt seen and examined in icu, intubated and sedated. , on tube feeds, uo high, hasn't been all recorded despite having suzie matute icu RN    6/12: pt seen and examined, intubated and sedated in icu.     6/13: pt seen in icu, intubated. Off sedation, on tube feeds    PMH:    History reviewed. No pertinent past medical history.    Patient Active Problem List   Diagnosis    AMS (altered mental status)    Cellulitis    Hypernatremia    HLD (hyperlipidemia)    GERD (gastroesophageal reflux disease)    Asthma    Hypothyroid    Depression    Anxiety    Acute respiratory failure (HCC)    Palliative care encounter       Diet:    Diet NPO 
[facility]  Nephrology Progress Note    Patient's Name: Jose G Will    History of Present Illness from 6/3 Consult Note:    \" the pt is a 58 yo male who goes as a female with a pmh of hld, gerd, hypothyroidm BPD, anxiety, depression who was sent in from generations for ms changes. They have been on lithium. History is obtained from the chart as pt is not able to speak. 3 RNS are in the room drawing blood and holding them down. An iv was just placed so no ivf were going since last night. They are moaning. Labs show na 148 on 6/2 and 167 this morning with repeat of 171, k 3.5, co2 19, bun 7, cr 1.1, ca 10.3, alb 3. Nh4 29, mg 2.5, cortisol 12.3, uds negative, wbc 6.7, hgb 10.2, plt 353, Li level 0.7. resp panel shows parainfluenza virus. Ua shows sg of <1.005. head ct, ct cervical spine, and cxr show no abnormality. Vitals show temp 98.4, rr 20, hr 96, bp 155/87. Uo is 1.7 last shift and was 3.1 overnight. \"    Subjective:    6/6: Patient was seen and examined.  Transferred to the ICU today, intubated, eyes closed.    6/7: pt intubated, pure wick in place, on TF    6/8: pt seen and examined, intubated on tube feeds, free water increased today    6/9: pt seen and examined, remains intubated, on tube feeds    6/10: pt seen in icu, intubated, on tube feeds, suzie icu attending    6/11: pt seen and examined in icu, intubated and sedated. , on tube feeds, uo high, hasn't been all recorded despite having suzie matute icu RN    6/12: pt seen and examined, intubated and sedated in icu.     PMH:    History reviewed. No pertinent past medical history.    Patient Active Problem List   Diagnosis    AMS (altered mental status)    Cellulitis    Hypernatremia    HLD (hyperlipidemia)    GERD (gastroesophageal reflux disease)    Asthma    Hypothyroid    Depression    Anxiety    Acute respiratory failure (HCC)       Diet:    Diet NPO Exceptions are: Sips of Water with Meds  ADULT TUBE FEEDING; Nasogastric; Standard with Fiber; 
[facility]  Nephrology Progress Note    Patient's Name: Jose G Will    History of Present Illness from 6/3 Consult Note:    \" the pt is a 60 yo male who goes as a female with a pmh of hld, gerd, hypothyroidm BPD, anxiety, depression who was sent in from generations for ms changes. They have been on lithium. History is obtained from the chart as pt is not able to speak. 3 RNS are in the room drawing blood and holding them down. An iv was just placed so no ivf were going since last night. They are moaning. Labs show na 148 on 6/2 and 167 this morning with repeat of 171, k 3.5, co2 19, bun 7, cr 1.1, ca 10.3, alb 3. Nh4 29, mg 2.5, cortisol 12.3, uds negative, wbc 6.7, hgb 10.2, plt 353, Li level 0.7. resp panel shows parainfluenza virus. Ua shows sg of <1.005. head ct, ct cervical spine, and cxr show no abnormality. Vitals show temp 98.4, rr 20, hr 96, bp 155/87. Uo is 1.7 last shift and was 3.1 overnight. \"    Subjective:    6/15: mental status essentially unchanged; no other acute isssues from overnight; RN reports he is more agitated today    6/16: No new acute issues from overnight; not requiring pressors    6/17: Patient scheduled to have an LP; possible trach and PEG today; no other acute events overnight; remains intubated    6/18: Possible trach and PEG today; IVIG being considered      Patient Active Problem List   Diagnosis    AMS (altered mental status)    Cellulitis    Hypernatremia    HLD (hyperlipidemia)    GERD (gastroesophageal reflux disease)    Asthma    Hypothyroid    Depression    Anxiety    Acute respiratory failure (HCC)    Palliative care encounter    Goals of care, counseling/discussion       Diet:    Diet NPO Exceptions are: Sips of Water with Meds  ADULT TUBE FEEDING; Nasogastric; Standard with Fiber; Continuous; 20; Yes; 10; Q 4 hours; 55; 500; Q 6 hours; Protein; 1 Dose; Daily    Meds:     docusate sodium  100 mg Oral Daily    midazolam  4 mg IntraVENous See Admin Instructions 
[facility]  Nephrology Progress Note    Patient's Name: Jose G Will    History of Present Illness from 6/3 Consult Note:    \" the pt is a 60 yo male who goes as a female with a pmh of hld, gerd, hypothyroidm BPD, anxiety, depression who was sent in from generations for ms changes. They have been on lithium. History is obtained from the chart as pt is not able to speak. 3 RNS are in the room drawing blood and holding them down. An iv was just placed so no ivf were going since last night. They are moaning. Labs show na 148 on 6/2 and 167 this morning with repeat of 171, k 3.5, co2 19, bun 7, cr 1.1, ca 10.3, alb 3. Nh4 29, mg 2.5, cortisol 12.3, uds negative, wbc 6.7, hgb 10.2, plt 353, Li level 0.7. resp panel shows parainfluenza virus. Ua shows sg of <1.005. head ct, ct cervical spine, and cxr show no abnormality. Vitals show temp 98.4, rr 20, hr 96, bp 155/87. Uo is 1.7 last shift and was 3.1 overnight. \"    Subjective:    6/5: Patient was seen and examined.  Awake, not answering questions at this time    PMH:    History reviewed. No pertinent past medical history.    Patient Active Problem List   Diagnosis    AMS (altered mental status)    Cellulitis    Hypernatremia    HLD (hyperlipidemia)    GERD (gastroesophageal reflux disease)    Asthma    Hypothyroid    Depression    Anxiety       Diet:    Diet NPO Exceptions are: Sips of Water with Meds    Meds:     lidocaine  5 mL IntraDERmal Once    sodium chloride flush  5-40 mL IntraVENous 2 times per day    heparin flush  1 mL IntraVENous 2 times per day    DESMOpressin  2 mcg SubCUTAneous BID    divalproex  125 mg Oral 2 times per day    melatonin  6 mg Oral QPM    sodium chloride flush  5-40 mL IntraVENous 2 times per day    amLODIPine  2.5 mg Oral Daily    apixaban  2.5 mg Oral BID    atorvastatin  10 mg Oral QHS    carbidopa-levodopa  1 tablet Oral TID    famotidine  20 mg Oral Daily    finasteride  5 mg Oral Daily    [Held by provider] gabapentin  800 
[facility]  Nephrology Progress Note    Patient's Name: Jose G Will    History of Present Illness from 6/3 Consult Note:    \" the pt is a 60 yo male who goes as a female with a pmh of hld, gerd, hypothyroidm BPD, anxiety, depression who was sent in from generations for ms changes. They have been on lithium. History is obtained from the chart as pt is not able to speak. 3 RNS are in the room drawing blood and holding them down. An iv was just placed so no ivf were going since last night. They are moaning. Labs show na 148 on 6/2 and 167 this morning with repeat of 171, k 3.5, co2 19, bun 7, cr 1.1, ca 10.3, alb 3. Nh4 29, mg 2.5, cortisol 12.3, uds negative, wbc 6.7, hgb 10.2, plt 353, Li level 0.7. resp panel shows parainfluenza virus. Ua shows sg of <1.005. head ct, ct cervical spine, and cxr show no abnormality. Vitals show temp 98.4, rr 20, hr 96, bp 155/87. Uo is 1.7 last shift and was 3.1 overnight. \"    Subjective:    6/6: Patient was seen and examined.  Transferred to the ICU today, intubated, eyes closed.    6/7: pt intubated, pure wick in place, on TF    6/8: pt seen and examined, intubated on tube feeds, free water increased today    6/9: pt seen and examined, remains intubated, on tube feeds    6/10: pt seen in icu, intubated, on tube feeds, dw icu attending    6/11: pt seen and examined in icu, intubated and sedated. , on tube feeds, uo high, hasn't been all recorded despite having suzie matute icu RN    PMH:    History reviewed. No pertinent past medical history.    Patient Active Problem List   Diagnosis    AMS (altered mental status)    Cellulitis    Hypernatremia    HLD (hyperlipidemia)    GERD (gastroesophageal reflux disease)    Asthma    Hypothyroid    Depression    Anxiety    Acute respiratory failure (HCC)       Diet:    Diet NPO Exceptions are: Sips of Water with Meds  ADULT TUBE FEEDING; Nasogastric; Standard with Fiber; Continuous; 20; Yes; 10; Q 4 hours; 55; 420; Q 4 hours; Protein; 
[facility]  Nephrology Progress Note    Patient's Name: Jose G Will    History of Present Illness from 6/3 Consult Note:    \" the pt is a 60 yo male who goes as a female with a pmh of hld, gerd, hypothyroidm BPD, anxiety, depression who was sent in from generations for ms changes. They have been on lithium. History is obtained from the chart as pt is not able to speak. 3 RNS are in the room drawing blood and holding them down. An iv was just placed so no ivf were going since last night. They are moaning. Labs show na 148 on 6/2 and 167 this morning with repeat of 171, k 3.5, co2 19, bun 7, cr 1.1, ca 10.3, alb 3. Nh4 29, mg 2.5, cortisol 12.3, uds negative, wbc 6.7, hgb 10.2, plt 353, Li level 0.7. resp panel shows parainfluenza virus. Ua shows sg of <1.005. head ct, ct cervical spine, and cxr show no abnormality. Vitals show temp 98.4, rr 20, hr 96, bp 155/87. Uo is 1.7 last shift and was 3.1 overnight. \"    Subjective:    6/6: Patient was seen and examined.  Transferred to the ICU today, intubated, eyes closed.    6/7: pt intubated, pure wick in place, on TF    6/8: pt seen and examined, intubated on tube feeds, free water increased today    6/9: pt seen and examined, remains intubated, on tube feeds    6/10: pt seen in icu, intubated, on tube feeds, dw icu attending    PMH:    History reviewed. No pertinent past medical history.    Patient Active Problem List   Diagnosis    AMS (altered mental status)    Cellulitis    Hypernatremia    HLD (hyperlipidemia)    GERD (gastroesophageal reflux disease)    Asthma    Hypothyroid    Depression    Anxiety    Acute respiratory failure (HCC)       Diet:    Diet NPO Exceptions are: Sips of Water with Meds  ADULT TUBE FEEDING; Nasogastric; Standard with Fiber; Continuous; 20; Yes; 10; Q 4 hours; 55; 420; Q 4 hours; Protein; 1 Dose; Daily    Meds:     DESMOpressin  1 mcg IntraVENous BID    chlorhexidine  15 mL Mouth/Throat BID    Polyvinyl Alcohol-Povidone PF  2 
[facility]  Nephrology Progress Note    Patient's Name: Jose G Will    History of Present Illness from 6/3 Consult Note:    \" the pt is a 60 yo male who goes as a female with a pmh of hld, gerd, hypothyroidm BPD, anxiety, depression who was sent in from generations for ms changes. They have been on lithium. History is obtained from the chart as pt is not able to speak. 3 RNS are in the room drawing blood and holding them down. An iv was just placed so no ivf were going since last night. They are moaning. Labs show na 148 on 6/2 and 167 this morning with repeat of 171, k 3.5, co2 19, bun 7, cr 1.1, ca 10.3, alb 3. Nh4 29, mg 2.5, cortisol 12.3, uds negative, wbc 6.7, hgb 10.2, plt 353, Li level 0.7. resp panel shows parainfluenza virus. Ua shows sg of <1.005. head ct, ct cervical spine, and cxr show no abnormality. Vitals show temp 98.4, rr 20, hr 96, bp 155/87. Uo is 1.7 last shift and was 3.1 overnight. \"    Subjective:    6/6: Patient was seen and examined.  Transferred to the ICU today, intubated, eyes closed.    6/7: pt intubated, pure wick in place, on TF    6/8: pt seen and examined, intubated on tube feeds, free water increased today    6/9: pt seen and examined, remains intubated, on tube feeds    PMH:    History reviewed. No pertinent past medical history.    Patient Active Problem List   Diagnosis    AMS (altered mental status)    Cellulitis    Hypernatremia    HLD (hyperlipidemia)    GERD (gastroesophageal reflux disease)    Asthma    Hypothyroid    Depression    Anxiety    Acute respiratory failure (HCC)       Diet:    Diet NPO Exceptions are: Sips of Water with Meds  ADULT TUBE FEEDING; Nasogastric; Standard with Fiber; Continuous; 20; Yes; 10; Q 4 hours; 55; 420; Q 4 hours; Protein; 1 Dose; Daily    Meds:     DESMOpressin  1 mcg IntraVENous Daily    chlorhexidine  15 mL Mouth/Throat BID    Polyvinyl Alcohol-Povidone PF  2 drop Both Eyes Q4H    pantoprazole (PROTONIX) 40 mg in sodium 
This RN contacted Jayson Araiza's brother to inform him of the wrist restraints that were placed he said that his brother has a Doctor that he sees for mental health via the [facility] and they may be more beneficial to contact them to get information on the patient if necessary.  
Will plan for Trach/PEG tomorrow. OK for IR procedure today from a surgical POV..        A/P    Trach/PEG 6/18  Continue holding eliquis   NPO at midnight for procedure.   Hold anticoagulation     Plan ROBERT Attending  
aMi Knox, NP notified that Jayson Will, patient's brother, just called the unit and said his brother made him the POA. RN told Jayson that there was a phone meeting scheduled for 10AM 6/15/24 with healthcare team and patient's child, Krish. They will be discussing code status and how to proceed with care.  
        Objective:     BP (!) 143/85   Pulse (!) 118   Temp 100.4 °F (38 °C) (Esophageal)   Resp 26   Ht 1.829 m (6' 0.01\")   Wt 119.1 kg (262 lb 9.1 oz)   SpO2 96%   BMI 35.60 kg/m²     General appearance: eyes closed, appears stated age and no distress  Head: normocephalic, without obvious abnormality, atraumatic  Eyes: conjunctivae/corneas clear  Neck: supple, symmetrical, trachea midline   Lungs: respirations per vent  Skin: color, texture, turgor normal      Mental Status: Sedated and vented. Does not follow commands.     Speech/Language: intubated    Cranial Nerves:  I: smell    II: visual acuity     II: visual fields No response to visual treat    II: pupils GONSALO   III,VII: ptosis None   III,IV,VI: extraocular muscles  Doll's intact   V: mastication    V: facial light touch sensation     V,VII: corneal reflex  Intact    VII: facial muscle function - upper  Normal   VII: facial muscle function - lower Appears symmetric    VIII: hearing    IX: soft palate elevation     IX,X: gag reflex Present   XI: trapezius strength     XI: sternocleidomastoid strength    XI: neck extension strength     XII: tongue strength       Motor/sensory:  No response to pain in upper extremities for me; b/l babinski   Obese bulk and normal tone  No abnormal movements    Reflexes:  Bilateral Babinski's    Laboratory/Radiology:     CBC with Differential:    Lab Results   Component Value Date/Time    WBC 8.6 06/12/2024 05:22 AM    RBC 2.36 06/12/2024 05:22 AM    HGB 7.6 06/12/2024 05:22 AM    HCT 25.4 06/12/2024 05:22 AM     06/12/2024 05:22 AM    .6 06/12/2024 05:22 AM    MCH 32.2 06/12/2024 05:22 AM    MCHC 29.9 06/12/2024 05:22 AM    RDW 15.2 06/12/2024 05:22 AM    NRBC 4 06/11/2024 04:07 AM    METASPCT 2 06/11/2024 04:07 AM    LYMPHOPCT 11 06/12/2024 05:22 AM    MONOPCT 9 06/12/2024 05:22 AM    MYELOPCT 1 06/11/2024 04:07 AM    EOSPCT 2 06/12/2024 05:22 AM    BASOPCT 0 06/12/2024 05:22 AM    MONOSABS 0.75 
        Objective:     BP (!) 164/83   Pulse (!) 130   Temp 99.7 °F (37.6 °C) (Esophageal)   Resp (!) 32   Ht 1.829 m (6')   Wt 119.1 kg (262 lb 9.1 oz)   SpO2 95%   BMI 35.61 kg/m²     General appearance: eyes closed, appears stated age and no distress  Head: normocephalic, without obvious abnormality, atraumatic  Eyes: conjunctivae/corneas clear  Neck: supple, symmetrical, trachea midline   Lungs: respirations per vent  Skin: color, texture, turgor normal      Mental Status: Sedated and vented. Does not follow commands.     Speech/Language: intubated    Cranial Nerves:  I: smell    II: visual acuity     II: visual fields No response to visual treat    II: pupils GONSALO   III,VII: ptosis None   III,IV,VI: extraocular muscles  Doll's intact   V: mastication    V: facial light touch sensation     V,VII: corneal reflex  Intact    VII: facial muscle function - upper  Normal   VII: facial muscle function - lower Appears symmetric    VIII: hearing    IX: soft palate elevation     IX,X: gag reflex Present   XI: trapezius strength     XI: sternocleidomastoid strength    XI: neck extension strength     XII: tongue strength       Motor/sensory:  No response to pain in upper extremities for me; b/l babinski   Obese bulk and normal tone  No abnormal movements    Reflexes:  Bilateral Babinski's    Laboratory/Radiology:     CBC with Differential:    Lab Results   Component Value Date/Time    WBC 7.2 06/13/2024 05:01 AM    RBC 2.17 06/13/2024 05:01 AM    HGB 7.1 06/13/2024 05:01 AM    HCT 23.9 06/13/2024 05:01 AM     06/13/2024 05:01 AM    .1 06/13/2024 05:01 AM    MCH 32.7 06/13/2024 05:01 AM    MCHC 29.7 06/13/2024 05:01 AM    RDW 15.0 06/13/2024 05:01 AM    NRBC 4 06/11/2024 04:07 AM    METASPCT 2 06/13/2024 05:01 AM    LYMPHOPCT 9 06/13/2024 05:01 AM    MONOPCT 4 06/13/2024 05:01 AM    MYELOPCT 3 06/13/2024 05:01 AM    EOSPCT 2 06/13/2024 05:01 AM    BASOPCT 0 06/13/2024 05:01 AM    MONOSABS 0.31 06/13/2024 
    Scheduled Medications    docusate sodium  100 mg Oral Daily    midazolam  4 mg IntraVENous See Admin Instructions    amLODIPine  10 mg Oral Daily    sodium chloride flush  5-40 mL IntraVENous 2 times per day    white petrolatum   Topical BID    DESMOpressin  10 mcg IntraVENous BID    QUEtiapine  200 mg Oral BID    valproate (DEPACON) 750 mg in sodium chloride 0.9 % 100 mL IVPB  750 mg IntraVENous Q12H    lidocaine PF  5 mL IntraDERmal Once    sodium chloride flush  5-40 mL IntraVENous 2 times per day    [Held by provider] heparin flush  3 mL IntraVENous 2 times per day    chlorhexidine  15 mL Mouth/Throat BID    Polyvinyl Alcohol-Povidone PF  2 drop Both Eyes Q4H    pantoprazole (PROTONIX) 40 mg in sodium chloride (PF) 0.9 % 10 mL injection  40 mg IntraVENous Daily    lidocaine  5 mL IntraDERmal Once    sodium chloride flush  5-40 mL IntraVENous 2 times per day    [Held by provider] heparin flush  1 mL IntraVENous 2 times per day    [Held by provider] apixaban  2.5 mg Oral BID    atorvastatin  10 mg Oral QHS    carbidopa-levodopa  1 tablet Oral TID    finasteride  5 mg Oral Daily    gabapentin  800 mg Oral TID    [Held by provider] medroxyPROGESTERone  10 mg Oral Daily    oxyBUTYnin  5 mg Oral BID    [Held by provider] propranolol  10 mg Oral BID    tamsulosin  0.4 mg Oral QHS    azaTHIOprine  150 mg Oral Daily     PRN Meds: sodium chloride, morphine **OR** morphine, glycopyrrolate, LORazepam, sodium chloride flush, sodium chloride, white petrolatum **AND** white petrolatum, menthol-zinc oxide, hydrALAZINE, sodium chloride flush, sodium chloride, heparin flush, ipratropium 0.5 mg-albuterol 2.5 mg, sodium chloride flush, sodium chloride, heparin flush, potassium chloride **OR** potassium alternative oral replacement **OR** potassium chloride, magnesium sulfate, ondansetron **OR** ondansetron, polyethylene glycol, acetaminophen **OR** acetaminophen, benztropine, hydrOXYzine pamoate    I/O    Intake/Output Summary 
   [Held by provider] gabapentin  800 mg Oral TID    medroxyPROGESTERone  10 mg Oral Daily    oxyBUTYnin  5 mg Oral BID    pantoprazole  40 mg Oral Daily    propranolol  10 mg Oral BID    tamsulosin  0.4 mg Oral QHS    azaTHIOprine  150 mg Oral Daily     PRN Meds: sodium chloride flush, sodium chloride, heparin flush, sodium chloride flush, sodium chloride, potassium chloride **OR** potassium alternative oral replacement **OR** potassium chloride, magnesium sulfate, ondansetron **OR** ondansetron, polyethylene glycol, acetaminophen **OR** acetaminophen, benztropine, hydrOXYzine pamoate    I/O    Intake/Output Summary (Last 24 hours) at 6/5/2024 1025  Last data filed at 6/5/2024 0745  Gross per 24 hour   Intake 0 ml   Output 1200 ml   Net -1200 ml         Labs:   Recent Labs     06/03/24  0558 06/04/24  0332 06/05/24  0542   WBC 6.7 9.4 9.0   HGB 10.2* 13.3 10.3*   HCT 34.0* 41.5 34.5*    361 387         Recent Labs     06/03/24  0948 06/03/24  1519 06/04/24  0332 06/04/24  0627 06/05/24  0039 06/05/24  0542 06/05/24  0746   *   < > 172*   < > 168* 165* 166*   K 3.5  --  3.4*  --   --  3.2*  --    *  --  137*  --   --  132*  --    CO2 19*  --  20*  --   --  19*  --    BUN 7  --  7  --   --  9  --    CREATININE 1.1  --  1.3*  --   --  1.4*  --    CALCIUM 10.3*  --  10.1  --   --  9.4  --     < > = values in this interval not displayed.         No results for input(s): \"PROT\", \"ALKPHOS\", \"ALT\", \"AST\", \"BILITOT\", \"AMYLASE\", \"LIPASE\" in the last 72 hours.    Invalid input(s): \"ALB\"      No results for input(s): \"INR\" in the last 72 hours.    No results for input(s): \"CKTOTAL\", \"TROPONINI\" in the last 72 hours.    Chronic labs:  Lab Results   Component Value Date    TSH 2.24 06/02/2024       Radiology:  Imaging studies reviewed today.    ASSESSMENT:    Principal Problem:    AMS (altered mental status)  Active Problems:    Cellulitis    Hypernatremia    HLD (hyperlipidemia)    GERD (gastroesophageal 
Body Weight: 117 kg (258 lb) (6/2 admit wt as CBW elevated), 144.9 % IBW. Weight Source: Bed Scale  Current BMI (kg/m2): 35  Usual Body Weight:  (UTO d/t lack of wt hx on file)                   BMI Categories: Obese Class 2 (BMI 35.0 -39.9)    Estimated Daily Nutrient Needs:  Energy Requirements Based On: Formula  Weight Used for Energy Requirements: Admission  Energy (kcal/day): ; 4502-3023  Weight Used for Protein Requirements: Ideal  Protein (g/day): 1.5-1.8 g/kg IBW; 120-145  Method Used for Fluid Requirements: Other (Comment)  Fluid (ml/day): per critical care    Nutrition Diagnosis:   Inadequate oral intake related to impaired respiratory function as evidenced by NPO or clear liquid status due to medical condition, intubation, nutrition support - enteral nutrition    Nutrition Interventions:   Nutrition Education/Counseling: Education not appropriate  Coordination of Nutrition Care: Continue to monitor while inpatient      Goals:  Previous Goal Met: Progressing toward Goal(s)  Goals: Tolerate nutrition support at goal rate      Nutrition Monitoring and Evaluation:   Food/Nutrient Intake Outcomes: Enteral Nutrition Intake/Tolerance  Physical Signs/Symptoms Outcomes: Chewing or Swallowing, Biochemical Data, GI Status, Fluid Status or Edema, Hemodynamic Status, Meal Time Behavior, Weight, Skin, Nutrition Focused Physical Findings    Discharge Planning:    Too soon to determine     Santa Celaya RD, LD  Contact: ext [phone]    
IntraVENous BID    chlorhexidine  15 mL Mouth/Throat BID    Polyvinyl Alcohol-Povidone PF  2 drop Both Eyes Q4H    pantoprazole (PROTONIX) 40 mg in sodium chloride (PF) 0.9 % 10 mL injection  40 mg IntraVENous Daily    vancomycin  15 mg/kg IntraVENous Q24H    lidocaine  5 mL IntraDERmal Once    sodium chloride flush  5-40 mL IntraVENous 2 times per day    heparin flush  1 mL IntraVENous 2 times per day    divalproex  125 mg Oral 2 times per day    [Held by provider] melatonin  6 mg Oral QPM    [Held by provider] amLODIPine  2.5 mg Oral Daily    apixaban  2.5 mg Oral BID    atorvastatin  10 mg Oral QHS    carbidopa-levodopa  1 tablet Oral TID    finasteride  5 mg Oral Daily    [Held by provider] gabapentin  800 mg Oral TID    medroxyPROGESTERone  10 mg Oral Daily    oxyBUTYnin  5 mg Oral BID    [Held by provider] propranolol  10 mg Oral BID    tamsulosin  0.4 mg Oral QHS    azaTHIOprine  150 mg Oral Daily     PRN Meds: ipratropium 0.5 mg-albuterol 2.5 mg, sodium chloride flush, sodium chloride, heparin flush, potassium chloride **OR** potassium alternative oral replacement **OR** potassium chloride, magnesium sulfate, ondansetron **OR** ondansetron, polyethylene glycol, acetaminophen **OR** acetaminophen, benztropine, hydrOXYzine pamoate    I/O    Intake/Output Summary (Last 24 hours) at 6/10/2024 1008  Last data filed at 6/10/2024 0900  Gross per 24 hour   Intake 2993 ml   Output 1950 ml   Net 1043 ml         Labs:   Recent Labs     06/08/24  0421 06/09/24  0428 06/10/24  0435   WBC 12.8* 11.3 10.0   HGB 9.0* 8.2* 8.4*   HCT 28.5* 26.6* 27.5*    240 232         Recent Labs     06/08/24  0421 06/08/24 1000 06/09/24  0428 06/09/24  1000 06/09/24  2208 06/10/24  0435   *   < > 149* 150* 148* 148*   K 3.4*   < > 3.6 3.6 3.5 3.5   *   < > 116* 116* 114* 113*   CO2 24   < > 24 23 25 24   BUN 27*   < > 24* 21 20 20   CREATININE 1.4*   < > 1.1 1.0 0.9 0.9   CALCIUM 9.0   < > 8.8 8.8 9.0 8.9   PHOS 1.8*  
NOK:  Krish Will (child) [phone]  Jayson Pickett (brother) [phone]    Spiritual assessment: no spiritual distress identified  Bereavement and grief: to be determined  Referrals to: none today    SUBJECTIVE:     Details of Conversation:     Chart reviewed.  Over the weekend events noted.  Patient was seen at the bedside, awake, intubated, with nonpurposeful movement, not following commands.  Got update from bedside nurse, plan is for LP today, and trach and PEG to follow.  CODE STATUS has been established DNR CCA.  There were no family present at the bedside.  No immediate PM needs were identified.  Will continue to follow peripherally.    Prognosis: Poor    OBJECTIVE:     BP (!) 145/74   Pulse (!) 117   Temp 99.7 °F (37.6 °C) (Axillary)   Resp 23   Ht 1.829 m (6')   Wt 119.1 kg (262 lb 9.1 oz)   SpO2 97%   BMI 35.61 kg/m²     Physical Examination:  Gen: intubated; off sedation at present, ill appearing   HEENT: normocephalic, atraumatic   Neck: trachea midline, no JVD  Lungs: respirations per ventilator   Heart: regular rate and rhythm, distant heart tones,   Abdomen: normoactive bowel sounds, soft, non-tender  Extremities: no clubbing, cyanosis or edema  Skin: warm, dry without rashes, lesions, bruising  Neuro: intubated; off sedation at present; not following commands; moves to pain    Objective data reviewed: labs, images, records, medication use, vitals, and chart    Time/Communication  Greater than 50% of time spent, total 25 minutes in counseling and coordination of care at the bedside regarding goals of care.    Thank you for allowing Palliative Medicine to participate in the care of Jose G Will.  
SpO2 100%   BMI 35.30 kg/m²     General appearance: eyes closed, appears stated age and no distress  Head: normocephalic, without obvious abnormality, atraumatic  Eyes: conjunctivae/corneas clear  Neck: supple, symmetrical, trachea midline   Lungs: respirations per vent  Skin: color, texture, turgor normal      Mental Status: Sedated and vented.  Moves extremities to noxious stimuli.       Speech/Language: intubated    Cranial Nerves:  I: smell NA   II: visual acuity  NA   II: visual fields    II: pupils GONSALO--- PP   III,VII: ptosis None   III,IV,VI: extraocular muscles     V: mastication    V: facial light touch sensation     V,VII: corneal reflex     VII: facial muscle function - upper  Normal   VII: facial muscle function - lower Normal   VIII: hearing    IX: soft palate elevation     IX,X: gag reflex    XI: trapezius strength     XI: sternocleidomastoid strength    XI: neck extension strength     XII: tongue strength       Motor/sensory:  WAGNER to noxious stimuli withdraws or attempts   Obese bulk and normal tone  No abnormal movements    Reflexes:  Bilateral Babinski's     Laboratory/Radiology:     CBC with Differential:    Lab Results   Component Value Date/Time    WBC 11.9 06/07/2024 04:20 AM    RBC 2.78 06/07/2024 04:20 AM    HGB 9.2 06/07/2024 04:20 AM    HCT 28.8 06/07/2024 04:20 AM     06/07/2024 04:20 AM    .6 06/07/2024 04:20 AM    MCH 33.1 06/07/2024 04:20 AM    MCHC 31.9 06/07/2024 04:20 AM    RDW 14.6 06/07/2024 04:20 AM    LYMPHOPCT 16 06/05/2024 05:42 AM    MONOPCT 14 06/05/2024 05:42 AM    EOSPCT 3 06/05/2024 05:42 AM    BASOPCT 1 06/05/2024 05:42 AM    MONOSABS 1.27 06/05/2024 05:42 AM    EOSABS 0.29 06/05/2024 05:42 AM    BASOSABS 0.07 06/05/2024 05:42 AM     CMP:    Lab Results   Component Value Date/Time     06/07/2024 04:20 AM    K 3.5 06/07/2024 04:20 AM     06/07/2024 04:20 AM    CO2 19 06/07/2024 04:20 AM    BUN 23 06/07/2024 04:20 AM    CREATININE 1.7 06/07/2024 
consult,  Tino Anderson, [place], [facility] 6/13/2024 12:03 PM  [phone]        
mcg SubCUTAneous BID    divalproex  125 mg Oral 2 times per day    melatonin  6 mg Oral QPM    sodium chloride flush  5-40 mL IntraVENous 2 times per day    amLODIPine  2.5 mg Oral Daily    apixaban  2.5 mg Oral BID    atorvastatin  10 mg Oral QHS    carbidopa-levodopa  1 tablet Oral TID    famotidine  20 mg Oral Daily    finasteride  5 mg Oral Daily    [Held by provider] gabapentin  800 mg Oral TID    medroxyPROGESTERone  10 mg Oral Daily    oxyBUTYnin  5 mg Oral BID    pantoprazole  40 mg Oral Daily    propranolol  10 mg Oral BID    tamsulosin  0.4 mg Oral QHS    azaTHIOprine  150 mg Oral Daily     PRN Meds: sodium chloride flush, sodium chloride, heparin flush, sodium chloride flush, sodium chloride, potassium chloride **OR** potassium alternative oral replacement **OR** potassium chloride, magnesium sulfate, ondansetron **OR** ondansetron, polyethylene glycol, acetaminophen **OR** acetaminophen, benztropine, hydrOXYzine pamoate    I/O  No intake or output data in the 24 hours ending 06/06/24 1136      Labs:   Recent Labs     06/04/24  0332 06/05/24  0542   WBC 9.4 9.0   HGB 13.3 10.3*   HCT 41.5 34.5*    387         Recent Labs     06/04/24  0332 06/04/24  0627 06/05/24  0542 06/05/24  0746 06/06/24  0020 06/06/24  0343 06/06/24  0802   *   < > 165*   < > 163* 159* 158*   K 3.4*  --  3.2*  --   --   --   --    *  --  132*  --   --   --   --    CO2 20*  --  19*  --   --   --   --    BUN 7  --  9  --   --   --   --    CREATININE 1.3*  --  1.4*  --   --   --   --    CALCIUM 10.1  --  9.4  --   --   --   --     < > = values in this interval not displayed.         No results for input(s): \"PROT\", \"ALKPHOS\", \"ALT\", \"AST\", \"BILITOT\", \"AMYLASE\", \"LIPASE\" in the last 72 hours.    Invalid input(s): \"ALB\"      No results for input(s): \"INR\" in the last 72 hours.    No results for input(s): \"CKTOTAL\", \"TROPONINI\" in the last 72 hours.    Chronic labs:  Lab Results   Component Value Date    TSH 2.24 
06/02/24 2303    carbidopa-levodopa (SINEMET)  MG per tablet 1 tablet  1 tablet Oral TID CarrierNvoa APRN - CNP   1 tablet at 06/08/24 0751    finasteride (PROSCAR) tablet 5 mg  5 mg Oral Daily Carrier, MANJIT BhattN - CNP   5 mg at 06/08/24 0745    [Held by provider] gabapentin (NEURONTIN) capsule 800 mg  800 mg Oral TID Carrier, JANAE Bhatt CNP        hydrOXYzine pamoate (VISTARIL) capsule 50 mg  50 mg Oral 4x Daily PRN Carrier, MANJIT BhattN - CNP   50 mg at 06/02/24 2052    medroxyPROGESTERone (PROVERA) tablet 10 mg  10 mg Oral Daily Carrier, JANAE Bhatt - CNP   10 mg at 06/08/24 0740    oxyBUTYnin (DITROPAN) tablet 5 mg  5 mg Oral BID Carrier, JANAE Bhatt - CNP   5 mg at 06/08/24 0741    [Held by provider] propranolol (INDERAL) tablet 10 mg  10 mg Oral BID Carrier, MANJIT BhattN - CNP   10 mg at 06/06/24 0951    tamsulosin (FLOMAX) capsule 0.4 mg  0.4 mg Oral QHS Nova Zavala APRN - CNP   0.4 mg at 06/05/24 2240    azaTHIOprine (IMURAN) tablet 150 mg  150 mg Oral Daily Carrier, JANAE Bhatt - CNP   150 mg at 06/08/24 0740           PHYSICAL EXAM:      Vitals:   Blood Pressure 104/62   Pulse 91   Temperature 97.8 °F (36.6 °C) (Temporal)   Respiration 27   Height 1.829 m (6' 0.01\")   Weight 118.1 kg (260 lb 5.8 oz)   Oxygen Saturation 100%   Body Mass Index 35.30 kg/m²       General Appearance:    Intubated, sedated    Head:    Normocephalic, atraumatic   Eyes:    No pallor, no icterus,   Ears:    No obvious deformity or drainage.   Nose:   No nasal drainage   Throat:   Mucosa dry    Neck:   Supple, no lymphadenopathy   Lungs:     Scattered rhonchi    Heart:    Regular rate and rhythm, no murmur   Abdomen:     Soft, non-tender, bowel sounds present    Extremities:   No edema   Pulses:   Dorsalis pedis palpable    Skin:   Multiple striae on the abdomen.     CBC with Differential:      Lab Results   Component Value Date/Time    WBC 12.8 06/08/2024 04:21 AM    RBC 
S1, S2, No S3 or S4  Respiratory: decreased breath sounds at lung bases, no focal wheezes noted  Extremities: no clubbing/cyanosis/edema     Any additional physical findings:    MEDICATIONS:    Scheduled Meds:   clog zapper  10 mL PEG Tube Once    bumetanide  2 mg IntraVENous BID    docusate sodium  100 mg Oral Daily    lidocaine-EPINEPHrine  20 mL IntraDERmal Once    vancomycin  1,000 mg IntraVENous Q12H    Immune Globulin (Human)  30 g IntraVENous Daily    pantoprazole (PROTONIX) 40 mg in sodium chloride (PF) 0.9 % 10 mL injection  40 mg IntraVENous Q12H    midazolam  4 mg IntraVENous See Admin Instructions    amLODIPine  10 mg Oral Daily    sodium chloride flush  5-40 mL IntraVENous 2 times per day    white petrolatum   Topical BID    QUEtiapine  200 mg Oral BID    valproate (DEPACON) 750 mg in sodium chloride 0.9 % 100 mL IVPB  750 mg IntraVENous Q12H    lidocaine PF  5 mL IntraDERmal Once    [Held by provider] heparin flush  3 mL IntraVENous 2 times per day    chlorhexidine  15 mL Mouth/Throat BID    Polyvinyl Alcohol-Povidone PF  2 drop Both Eyes Q4H    lidocaine  5 mL IntraDERmal Once    [Held by provider] heparin flush  1 mL IntraVENous 2 times per day    [Held by provider] apixaban  2.5 mg Oral BID    atorvastatin  10 mg Oral QHS    carbidopa-levodopa  1 tablet Oral TID    finasteride  5 mg Oral Daily    gabapentin  800 mg Oral TID    [Held by provider] medroxyPROGESTERone  10 mg Oral Daily    oxyBUTYnin  5 mg Oral BID    [Held by provider] propranolol  10 mg Oral BID    tamsulosin  0.4 mg Oral QHS    azaTHIOprine  150 mg Oral Daily     Continuous Infusions:   sodium chloride      midazolam 2 mg/hr (06/19/24 0559)    fentaNYL 50 mcg/hr (06/19/24 0549)    sodium chloride       PRN Meds:   sodium chloride, , PRN  fentanNYL, 100 mcg, Q1H PRN  morphine, 2 mg, Q15 Min PRN   Or  morphine, 2 mg, Q15 Min PRN  glycopyrrolate, 0.2 mg, Q4H PRN  LORazepam, 1 mg, Q4H PRN  sodium chloride flush, 5-40 mL, PRN  sodium 
at 06/02/24 2303    carbidopa-levodopa (SINEMET)  MG per tablet 1 tablet  1 tablet Oral TID CarrierNova APRN - CNP   1 tablet at 06/07/24 0856    finasteride (PROSCAR) tablet 5 mg  5 mg Oral Daily Carrier, MANJIT BhattN - CNP   5 mg at 06/07/24 0846    [Held by provider] gabapentin (NEURONTIN) capsule 800 mg  800 mg Oral TID Carrier, JANAE Bhatt CNP        hydrOXYzine pamoate (VISTARIL) capsule 50 mg  50 mg Oral 4x Daily PRN Carrier, MANJIT BhattN - CNP   50 mg at 06/02/24 2052    medroxyPROGESTERone (PROVERA) tablet 10 mg  10 mg Oral Daily Carrier, MANJIT BhattN - CNP   10 mg at 06/07/24 0847    oxyBUTYnin (DITROPAN) tablet 5 mg  5 mg Oral BID Carrier, JANAE Bhatt - CNP   5 mg at 06/07/24 0847    [Held by provider] propranolol (INDERAL) tablet 10 mg  10 mg Oral BID Carrier, MANJIT BhattN - CNP   10 mg at 06/06/24 0951    tamsulosin (FLOMAX) capsule 0.4 mg  0.4 mg Oral QHS Nova Zavala APRN - CNP   0.4 mg at 06/05/24 2240    azaTHIOprine (IMURAN) tablet 150 mg  150 mg Oral Daily Carrier, MANJIT BhattN - CNP   150 mg at 06/07/24 0847           PHYSICAL EXAM:      Vitals:   Blood Pressure 101/61   Pulse 80   Temperature 97.7 °F (36.5 °C) (Temporal)   Respiration 21   Height 1.829 m (6' 0.01\")   Weight 118.1 kg (260 lb 5.8 oz)   Oxygen Saturation 100%   Body Mass Index 35.30 kg/m²       General Appearance:    Intubated, sedated    Head:    Normocephalic, atraumatic   Eyes:    No pallor, no icterus,   Ears:    No obvious deformity or drainage.   Nose:   No nasal drainage   Throat:   Mucosa dry    Neck:   Supple, no lymphadenopathy   Lungs:     Scattered rhonchi    Heart:    Regular rate and rhythm, no murmur   Abdomen:     Soft, non-tender, bowel sounds present    Extremities:   No edema   Pulses:   Dorsalis pedis palpable    Skin:   Multiple striae on the abdomen.     CBC with Differential:      Lab Results   Component Value Date/Time    WBC 11.9 06/07/2024 04:20 AM    
upper extremity PICC line, endotracheal tube, and enteric tube.   2.  Stable chest series.  No new cardiopulmonary pathology.      Microbiology:   Respiratory culture 6/6/2024 MRSA  Blood culture 6/2/2024 no growth 5 days  RVP with COVID 6/2/2024 parainfluenza 3 detected    IMPRESSION:      Respiratory failure - intubated  MRSA  pneumonia   Skin rash / wounds   Parainfluenza virus infection   AMS      RECOMMENDATIONS:       Vancomycin 1750 mg IV q 24 hrs ( clinical pharmacy following ) ( day 8 )   2.   Neurology following-plan for LP  3.   CRP, sed rate  4.  Supportive care                 Vee Redd, APRN - CNP  11:48 AM  6/16/2024  
      Intake/Output Summary (Last 24 hours) at 6/7/2024 1253  Last data filed at 6/7/2024 1200  Gross per 24 hour   Intake 8586.96 ml   Output 415 ml   Net 8171.96 ml       Date 06/06/24 0701 - 06/07/24 0700 06/07/24 0701 - 06/08/24 0700   Shift 7349-1404 2032-1721 24 Hour Total 6679-2927 3819-4568 24 Hour Total   INTAKE   I.V.  8245.6 8245.6      NG/GT  250 250      IV Piggyback  91.4 91.4      Shift Total  8587 8587      OUTPUT   Urine 9883 880 6668 125  125   Shift Total 6185 876 7200 125  125   NET -1100 8297 7197 -125  -125       EXAM:    Vitals:    06/07/24 1100 06/07/24 1200 06/07/24 1209 06/07/24 1211   BP: 96/62 99/64     Pulse: 87 84 84 83   Resp: 18 18 18 18   Temp:  98.4 °F (36.9 °C)     TempSrc:  Temporal     SpO2: 100% 100% 100% 99%   Weight:       Height:           General-  Pt sedated and intubated .   Head and neck- ETT # 7.5 , RASS of -1 , CHAPO, MMM , AT/NC, No JVD. No thyromegaly, trachea midline.   Pulmonary - Bilateral air entry adequate with decreased on left base. Few expiratory rhonchi , no crackles.   Cardiovascular-  S1, S2 , Mo, No rubs or gallops.    Abdomen-  Soft , non distended , Bowel sounds present. No organomegaly   Neuro- Intubated and sedated,` Pupils equal and reactive.   Extremities- 1 + pitting edema , No deformities   Skin- warm , Capillary refill normal , no new rashes.      Ventilator Settings:    Vent Mode: AC/VC  Resp Rate (Set): 18 bpm   Vt (Set, mL): 500 mL       PEEP/CPAP (cmH2O): 8   FiO2 : (S) 40 %      CBC:   Recent Labs     06/05/24  0542 06/07/24  0420   WBC 9.0 11.9*   HGB 10.3* 9.2*   HCT 34.5* 28.8*    290     BMP:    Recent Labs     06/05/24  0542 06/05/24  0746 06/06/24  0802 06/06/24  1741 06/07/24  0420   *   < > 157*  158* 152* 148*   K 3.2*  --  2.9* 3.2* 3.5   *  --  123* 122* 115*   CO2 19*  --  20* 19* 19*   BUN 9  --  11 16 23   CREATININE 1.4*  --  1.3* 1.5* 1.7*   GLUCOSE 131*  --  127* 125* 143*   CALCIUM 9.4  --  9.4 8.8 8.5* 
   propofol Stopped (06/11/24 0934)    sodium chloride         Vitals:  BP (!) 170/75   Pulse (!) 103   Temp 99.6 °F (37.6 °C) (Axillary)   Resp 21   Ht 1.829 m (6')   Wt 119.1 kg (262 lb 9.1 oz)   SpO2 92%   BMI 35.61 kg/m²   Tmax:  CVP:        Intake/Output Summary (Last 24 hours) at 6/14/2024 1129  Last data filed at 6/14/2024 1100  Gross per 24 hour   Intake 3651.8 ml   Output 3630 ml   Net 21.8 ml         Date 06/13/24 0701 - 06/14/24 0700 06/14/24 0701 - 06/15/24 0700   Shift 7279-2447 3038-3730 24 Hour Total 5165-7167 9735-9386 24 Hour Total   INTAKE   NG/GT 1205 1850 3055      IV Piggyback 943.2  943.2      Shift Total 2148.2 1850 3998.2      OUTPUT   Urine 1655 2050 3705 975  975   Shift Total 1655 2050 3705 975  975   .2 -200 293.2 -975  -975         EXAM:    Vitals:    06/14/24 0800 06/14/24 0900 06/14/24 0910 06/14/24 1000   BP: (!) 174/78 (!) 169/70  (!) 170/75   Pulse: (!) 104 (!) 104 (!) 105 (!) 103   Resp: 24 21 18 21   Temp: 99.6 °F (37.6 °C)      TempSrc: Axillary      SpO2: 97% 98% 100% 92%   Weight:       Height:           General-  Pt sedated and intubated .   Head and neck- ETT # 7.5 , RASS of -1 , CHAPO, MMM , AT/NC, No JVD. No thyromegaly, trachea midline.   Pulmonary - Bilateral air entry adequate with decreased on left base. Few expiratory rhonchi , no crackles.   Cardiovascular-  S1, S2 , Mo, No rubs or gallops.    Abdomen-  Soft , non distended , Bowel sounds present. No organomegaly   Neuro- Intubated and sedated,` Pupils equal and reactive.   Extremities- 1 + pitting edema , No deformities   Skin- warm , Capillary refill normal , no new rashes.      Ventilator Settings:    Vent Mode: AC/PRVC  Resp Rate (Set): 12 bpm   Vt (Set, mL): 430 mL       PEEP/CPAP (cmH2O): 8   FiO2 : 35 %      CBC:   Recent Labs     06/12/24  0522 06/13/24  0501 06/14/24  0359   WBC 8.6 7.2 8.7   HGB 7.6* 7.1* 7.4*   HCT 25.4* 23.9* 25.4*    171 203       BMP:    Recent Labs     
16 06/05/2024 05:42 AM    MONOPCT 14 06/05/2024 05:42 AM    EOSPCT 3 06/05/2024 05:42 AM    BASOPCT 1 06/05/2024 05:42 AM    MONOSABS 1.27 06/05/2024 05:42 AM    EOSABS 0.29 06/05/2024 05:42 AM    BASOSABS 0.07 06/05/2024 05:42 AM       CMP     Lab Results   Component Value Date/Time     06/09/2024 04:28 AM    K 3.6 06/09/2024 04:28 AM     06/09/2024 04:28 AM    CO2 24 06/09/2024 04:28 AM    BUN 24 06/09/2024 04:28 AM    CREATININE 1.1 06/09/2024 04:28 AM    LABGLOM 77 06/09/2024 04:28 AM    GLUCOSE 201 06/09/2024 04:28 AM    CALCIUM 8.8 06/09/2024 04:28 AM    BILITOT 0.3 06/09/2024 04:28 AM    ALKPHOS 90 06/09/2024 04:28 AM    AST 25 06/09/2024 04:28 AM    ALT 8 06/09/2024 04:28 AM         Hepatic Function Panel:    Lab Results   Component Value Date/Time    ALKPHOS 90 06/09/2024 04:28 AM    ALT 8 06/09/2024 04:28 AM    AST 25 06/09/2024 04:28 AM    BILITOT 0.3 06/09/2024 04:28 AM       PT/INR:  No results found for: \"PROTIME\", \"INR\"    TSH:    Lab Results   Component Value Date/Time    TSH 2.24 06/02/2024 08:00 AM       U/A:    Lab Results   Component Value Date/Time    COLORU Yellow 06/02/2024 07:45 AM    PHUR 7.0 06/02/2024 07:45 AM    LEUKOCYTESUR NEGATIVE 06/02/2024 07:45 AM    UROBILINOGEN 0.2 06/02/2024 07:45 AM    BILIRUBINUR NEGATIVE 06/02/2024 07:45 AM    GLUCOSEU NEGATIVE 06/02/2024 07:45 AM       ABG:  No results found for: \"HJQ0FBG\", \"BEART\", \"Y6YTTJNV\", \"PHART\", \"THGBART\", \"FIL5KVL\", \"PO2ART\", \"DCW9DFG\"    MICROBIOLOGY:    Blood culture - neg       Parainfluenza 3 PCR DETECTED Abnormal      Sputum cx -        Methicillin-Resistant Staphylococcus aureus (1)      Antibiotic Interpretation Microscan Method Status   clindamycin Sensitive 0.25 BACTERIAL SUSCEPTIBILITY PANEL JONN Final   DAPTOmycin Sensitive 0.25 BACTERIAL SUSCEPTIBILITY PANEL JONN Final   erythromycin Resistant >=8 BACTERIAL SUSCEPTIBILITY PANEL JONN Final   gentamicin Sensitive <=0.5 BACTERIAL SUSCEPTIBILITY PANEL JONN Final    
37.0 increased   TSH: 2.24  ESR: 89  Ferritin: 439  ACH receptor ab: Elevated at 1.7    CTH:  Negative for acute findings      EEG:  Summary: This is an abnormal Electroencephalogram with burst suppression pattern of EEG activity which can be secondary to use of sedation or hypoxic ischemic encephalopathy. There are no epileptiform abnormality or electrographic seizures in this recording.      R/p CT head 6/13   IMPRESSION:  Artifact degraded examination.  No acute intracranial abnormality.     Minimal dependent opacification left maxillary sinus.  Correlate for any  concern of sinusitis.      All labs and imaging studies reviewed independently today       JANAE Hart - CNP  9:04 AM  6/15/2024  
Care [id number]       Orthotic Management [id number]       Manual [id number]     Neuro Re-Ed [id number]       Non-Billable Time          Evaluation Time additionally includes thorough review of current medical information, gathering information on past medical history/social history and prior level of function, interpretation of standardized testing/informal observation of tasks, assessment of data and development of plan of care and goals.        Cara Panda, OTR/L #[id number]    
Prophylaxis:   Lovenox  Recommended disposition at discharge: Pending medical progression, nephrology eval, likely needs placement    +++++++++++++++++++++++++++++++++++++++++++++++++  Arslan Arita MD  [facility] Hospitalist  [facility].  +++++++++++++++++++++++++++++++++++++++++++++++++  NOTE: This report was transcribed using voice recognition software. Every effort was made to ensure accuracy; however, inadvertent computerized transcription errors may be present.       
[place].  +++++++++++++++++++++++++++++++++++++++++++++++++  NOTE: This report was transcribed using voice recognition software. Every effort was made to ensure accuracy; however, inadvertent computerized transcription errors may be present.       
at 6/10/2024 1450  Gross per 24 hour   Intake 2993 ml   Output 2525 ml   Net 468 ml         Date 06/09/24 0701 - 06/10/24 0700 06/10/24 0701 - 06/11/24 0700   Shift 6466-1652 3781-8293 24 Hour Total 0141-4632 4722-2162 24 Hour Total   INTAKE   I.V.  951 951      NG/ 1382 2042      Shift Total 660 2333 2993      OUTPUT   Urine  875  875   Shift Total  875  875   NET -290 1633 1343 -875  -875         EXAM:    Vitals:    06/10/24 1200 06/10/24 1300 06/10/24 1400 06/10/24 1500   BP: 116/63 (!) 126/54 (!) 104/52 (!) 75/44   Pulse: (!) 105 (!) 116 (!) 134 (!) 120   Resp: 30 (!) 31 (!) 39 30   Temp: 100.4 °F (38 °C)      TempSrc: Temporal      SpO2: 97% 96% 95% 96%   Weight:       Height:           General-  Pt sedated and intubated .   Head and neck- ETT # 7.5 , RASS of -1 , CHAPO, MMM , AT/NC, No JVD. No thyromegaly, trachea midline.   Pulmonary - Bilateral air entry adequate with decreased on left base. Few expiratory rhonchi , no crackles.   Cardiovascular-  S1, S2 , Mo, No rubs or gallops.    Abdomen-  Soft , non distended , Bowel sounds present. No organomegaly   Neuro- Intubated and sedated,` Pupils equal and reactive.   Extremities- 1 + pitting edema , No deformities   Skin- warm , Capillary refill normal , no new rashes.      Ventilator Settings:    Vent Mode: AC/VC  Resp Rate (Set): 12 bpm   Vt (Set, mL): 430 mL       PEEP/CPAP (cmH2O): 8   FiO2 : 35 %      CBC:   Recent Labs     06/08/24  0421 06/09/24  0428 06/10/24  0435   WBC 12.8* 11.3 10.0   HGB 9.0* 8.2* 8.4*   HCT 28.5* 26.6* 27.5*    240 232       BMP:    Recent Labs     06/08/24 0421 06/08/24  1000 06/09/24  0428 06/09/24  1000 06/09/24  2208 06/10/24  0435   *   < > 149* 150* 148* 148*   K 3.4*   < > 3.6 3.6 3.5 3.5   *   < > 116* 116* 114* 113*   CO2 24   < > 24 23 25 24   BUN 27*   < > 24* 21 20 20   CREATININE 1.4*   < > 1.1 1.0 0.9 0.9   GLUCOSE 164*   < > 201* 130* 198* 151*   CALCIUM 9.0   < > 8.8 
discharge: Pending medical progression, nephrology eval, likely needs placement    +++++++++++++++++++++++++++++++++++++++++++++++++  Arslan Arita MD  [facility] Hospitalist  [facility].  +++++++++++++++++++++++++++++++++++++++++++++++++  NOTE: This report was transcribed using voice recognition software. Every effort was made to ensure accuracy; however, inadvertent computerized transcription errors may be present.       
negative for acute findings  Elevated B12, normal ammonia, acetylcholine negative, elevated ESR, low folate  Patient's son is agreeable for LP; orders have been placed    Hx of MG- on imuran 150 mg daily prior to admission, however lack of any further information regarding this diagnosis and was not on mestinon or steroids. Ach receptor ab is + at 1.7     Plan:     Continue supportive care  Plans for trach and PEG tomorrow  Await LP; last dose of Eliquis on 6/14  Palliative following  Plan for trach and PEG on Tuesday    Neurology will follow peripherally and follow for LP results    JANAE Dawson - NP  2:39 PM  6/17/2024  
used smokeless tobacco.    Family History:     History reviewed. No pertinent family history.    Physical Exam:      Patient Vitals for the past 24 hrs:   BP Temp Temp src Pulse Resp SpO2 Height   06/06/24 0947 -- -- -- -- -- -- 1.829 m (6' 0.01\")   06/06/24 0714 (!) 132/102 97.7 °F (36.5 °C) Temporal 92 20 95 % --   06/06/24 0515 (!) 178/86 96.8 °F (36 °C) Temporal 89 22 93 % --   06/06/24 0020 (!) 176/73 96.9 °F (36.1 °C) Temporal 94 22 94 % --   06/05/24 1945 (!) 177/88 96.9 °F (36.1 °C) Temporal 100 -- 94 % --   06/05/24 1558 (!) 140/96 98.4 °F (36.9 °C) Axillary (!) 103 -- 94 % --   06/05/24 1112 (!) 178/101 98.1 °F (36.7 °C) Temporal (!) 115 24 96 % --         No intake or output data in the 24 hours ending 06/06/24 1106      General: Intubated  Neck: No JVD noted  Lungs: Rhonchi bilaterally upper, diminished to the bases bilaterally.  Intubated/vent  CV: Regular rate and rhythm.  No rub  Abd: Soft, nontender, nondistended.  Active bowel sounds  Skin: Warm and dry.  No rash on exposed extremities  Ext: Trace BUE/BLE edema   Neuro: Intubated/lethargic    Data:    Recent Labs     06/04/24  0332 06/05/24 0542   WBC 9.4 9.0   HGB 13.3 10.3*   HCT 41.5 34.5*   .7* 109.5*    387         Recent Labs     06/04/24  0332 06/04/24  0627 06/05/24  0542 06/05/24  0746 06/06/24  0020 06/06/24  0343 06/06/24  0802   *   < > 165*   < > 163* 159* 158*   K 3.4*  --  3.2*  --   --   --   --    *  --  132*  --   --   --   --    CO2 20*  --  19*  --   --   --   --    CREATININE 1.3*  --  1.4*  --   --   --   --    BUN 7  --  9  --   --   --   --    LABGLOM 65  --  57*  --   --   --   --    GLUCOSE 129*  --  131*  --   --   --   --    CALCIUM 10.1  --  9.4  --   --   --   --    MG 2.3  --  2.1  --   --   --   --     < > = values in this interval not displayed.         No results found for: \"VITD25\"    No results found for: \"PTH\"    No results for input(s): \"ALT\", \"AST\", \"ALKPHOS\", \"BILITOT\", \"BILIDIR\" in 
116*   < > 114* 113* 113*   CO2 24   < > 25 24 26   BUN 24*   < > 20 20 19   CREATININE 1.1   < > 0.9 0.9 0.8   GLUCOSE 201*   < > 198* 151* 207*   CALCIUM 8.8   < > 9.0 8.9 8.8   MG 2.4  --   --  2.3 2.2   PHOS 2.2*  --   --  2.9 3.0    < > = values in this interval not displayed.       HEPATIC:   Recent Labs     06/09/24  0428 06/10/24  0435 06/11/24  0407   AST 25 30 26   ALT 8 9 9   BILITOT 0.3 0.3 0.3   ALKPHOS 90 94 94       LACTATE:   No results for input(s): \"LACTA\" in the last 72 hours.      BLOOD GAS:   Recent Labs     06/10/24  0400 06/11/24  0410   PH 7.496* 7.499*   PCO2 31.5* 33.3*   PO2 93.1 118.0*   HCO3 23.8 25.3   O2SAT 97.7 98.6*       Radiology and available imaging -   Personally reviewed    ASSESSMENT AND PLAN:      Acute respiratory failure requiring emergent intubation and mechanical ventilation secondary to diffuse bilateral pneumonia plus minus aspiration.     MRSA pneumonia- his airway was filled with pus on bilateral lung fields requiring intense lavage and cleaning during the bronchoscopy.      Parainfluenza viral lower respiratory tract infection     Acute toxic metabolic encephalopathy  History of DVT and PE  History of myasthenia gravis  DI suspected to be associated with lithium  Bipolar disorder with depression       -Continue ICU care  -Continue mechanical ventilation, vent bundle, wean per protocol as tolerated  -Wean supplemental oxygen down with a goal oxygen saturation of more than 90%  -Daily spontaneous awakening trials followed by weaning trials if tolerated  -Serial sodium, nephrology on board  -Restart Seroquel, gabapentin, valproate.  Valproate IV loading and maintenance.  -Continue vancomycin for MRSA pneumonia, has also received 1 dose of cefepime, will continue to follow the final cultures, ID consulted and following . Cefepime now discontinued  -Continue droplet isolation for parainfluenza  -Infectious disease on board, discussed the case   -Neurology on board, EEG 
3.9 3.9 3.9   * 111* 114*   CO2 26 27 23   BUN 19 20 21   CREATININE 0.8 0.8 0.7   GLUCOSE 207* 267* 197*   CALCIUM 8.8 9.2 9.2   MG 2.2 2.1 2.0   PHOS 3.0 2.8 2.9       HEPATIC:   Recent Labs     06/11/24  0407 06/12/24  0522 06/13/24  0501   AST 26 17 14   ALT 9 9 <5   BILITOT 0.3 0.3 0.2   ALKPHOS 94 92 79       LACTATE:   No results for input(s): \"LACTA\" in the last 72 hours.      BLOOD GAS:   Recent Labs     06/12/24  0540 06/13/24  0447   PH 7.517* 7.495*   PCO2 31.0* 36.7   PO2 103.0* 83.7   HCO3 24.6 27.7*   O2SAT 98.2 97.0       Radiology and available imaging -   Personally reviewed    ASSESSMENT AND PLAN:      Acute respiratory failure requiring emergent intubation and mechanical ventilation secondary to diffuse bilateral pneumonia plus minus aspiration.     MRSA pneumonia- his airway was filled with pus on bilateral lung fields requiring intense lavage and cleaning during the bronchoscopy.      Parainfluenza viral lower respiratory tract infection     Acute toxic metabolic encephalopathy  History of DVT and PE  History of myasthenia gravis  DI suspected to be associated with lithium  Bipolar disorder with depression       -Continue ICU care  -Continue mechanical ventilation, vent bundle, wean per protocol as tolerated  -Wean supplemental oxygen down with a goal oxygen saturation of more than 90%  -Daily spontaneous awakening trials followed by weaning trials if tolerated  -Serial sodium, nephrology on board  -Restart Seroquel, gabapentin, valproate.  Valproate IV loading and maintenance.  -Continue vancomycin for MRSA pneumonia, has also received 1 dose of cefepime, will continue to follow the final cultures, ID consulted and following . Cefepime now discontinued  -Continue droplet isolation for parainfluenza  -Infectious disease on board, discussed the case   -Neurology on board, EEG done , final read pending, MRI Brain Pending.  -Palliative care consult  DVT/GI prophylaxis: Protonix and 
Value Date/Time    ALKPHOS 92 06/12/2024 05:22 AM    ALT 9 06/12/2024 05:22 AM    AST 17 06/12/2024 05:22 AM    BILITOT 0.3 06/12/2024 05:22 AM       PT/INR:    Lab Results   Component Value Date/Time    PROTIME 14.6 06/12/2024 05:22 AM    INR 1.3 06/12/2024 05:22 AM       TSH:    Lab Results   Component Value Date/Time    TSH 2.24 06/02/2024 08:00 AM       U/A:    Lab Results   Component Value Date/Time    COLORU Yellow 06/02/2024 07:45 AM    PHUR 7.0 06/02/2024 07:45 AM    LEUKOCYTESUR NEGATIVE 06/02/2024 07:45 AM    UROBILINOGEN 0.2 06/02/2024 07:45 AM    BILIRUBINUR NEGATIVE 06/02/2024 07:45 AM    GLUCOSEU NEGATIVE 06/02/2024 07:45 AM       ABG:  No results found for: \"XZI4YYW\", \"BEART\", \"W9PCOGBU\", \"PHART\", \"THGBART\", \"QOS6OLS\", \"PO2ART\", \"JYJ1LNQ\"    MICROBIOLOGY:    Blood culture - neg       Parainfluenza 3 PCR DETECTED Abnormal      Sputum cx -        Methicillin-Resistant Staphylococcus aureus (1)      Antibiotic Interpretation Microscan Method Status   clindamycin Sensitive 0.25 BACTERIAL SUSCEPTIBILITY PANEL JONN Final   DAPTOmycin Sensitive 0.25 BACTERIAL SUSCEPTIBILITY PANEL JONN Final   erythromycin Resistant >=8 BACTERIAL SUSCEPTIBILITY PANEL JONN Final   gentamicin Sensitive <=0.5 BACTERIAL SUSCEPTIBILITY PANEL JONN Final    Gentamicin is used only in combination with other active agents that test susceptible.      Induced Clind Resist Sensitive NEGATIVE BACTERIAL SUSCEPTIBILITY PANEL JONN Final   oxacillin Resistant >=4 BACTERIAL SUSCEPTIBILITY PANEL JONN Final   rifampin Sensitive <=0.5 BACTERIAL SUSCEPTIBILITY PANEL JONN Final   tigecycline Sensitive <=0.12 BACTERIAL SUSCEPTIBILITY PANEL JONN Final   trimethoprim-sulfamethoxazole Sensitive <=10 BACTERIAL SUSCEPTIBILITY PANEL JONN Final   vancomycin Sensitive 1 BACTERIAL SUSCEPTIBILITY PANEL JONN Final   doxycycline Sensitive <=0.5 BACTERIAL SUSCEPTIBILITY PANEL JONN Final       Vancomycin random level - 14.7     Radiology :    Chest X ray    Right basilar 
agents that test susceptible.      Induced Clind Resist Sensitive NEGATIVE BACTERIAL SUSCEPTIBILITY PANEL JONN Final   oxacillin Resistant >=4 BACTERIAL SUSCEPTIBILITY PANEL JONN Final   rifampin Sensitive <=0.5 BACTERIAL SUSCEPTIBILITY PANEL JONN Final   tigecycline Sensitive <=0.12 BACTERIAL SUSCEPTIBILITY PANEL JONN Final   trimethoprim-sulfamethoxazole Sensitive <=10 BACTERIAL SUSCEPTIBILITY PANEL JONN Final   vancomycin Sensitive 1 BACTERIAL SUSCEPTIBILITY PANEL JONN Final   doxycycline Sensitive <=0.5 BACTERIAL SUSCEPTIBILITY PANEL JONN Final       Vancomycin random level - 14.7     Radiology :    Chest X ray    Development of mild increased markings identified within the right lower lung   field with small right pleural effusion.       IMPRESSION:     Respiratory failure - intubated  MRSA  pneumonia   Skin rash / wound   Parainfluenza virus infection       RECOMMENDATIONS:      Vancomycin 1750 mg IV q 24 hrs ( clinical pharmacy following )   2. Vent per MICU   
hour   Intake 3382.25 ml   Output 1980 ml   Net 1402.25 ml       General: Intubated  Neck: No JVD noted  Lungs: Rhonchi bilaterally upper, diminished to the bases bilaterally.  Intubated/vent  CV: Regular rate and rhythm.  No rub  Abd: Soft, nontender, nondistended.  Active bowel sounds  Skin: Warm and dry.  No rash on exposed extremities  Ext: Trace BUE/BLE edema   Neuro: Intubated/lethargic    Data:    Recent Labs     06/07/24 0420 06/08/24 0421   WBC 11.9* 12.8*   HGB 9.2* 9.0*   HCT 28.8* 28.5*   .6* 104.8*    272       Recent Labs     06/06/24  1741 06/07/24  0420 06/07/24  1222 06/07/24  1723 06/07/24  2233 06/08/24 0421   * 148*   < > 143 146 149*   K 3.2* 3.5   < > 3.6 3.8 3.4*   * 115*   < > 111* 111* 115*   CO2 19* 19*   < > 22 24 24   CREATININE 1.5* 1.7*   < > 1.6* 1.6* 1.4*   BUN 16 23   < > 28* 28* 27*   LABGLOM 54* 47*   < > 47* 50* 57*   GLUCOSE 125* 143*   < > 131* 139* 164*   CALCIUM 8.8 8.5*   < > 8.5* 8.5* 9.0   PHOS 3.4 2.8  --   --   --  1.8*   MG 1.9 1.9  --   --   --  2.3    < > = values in this interval not displayed.       No results found for: \"VITD25\"    No results found for: \"PTH\"    Recent Labs     06/07/24 0420 06/08/24 0421   ALT 6 6   AST 23 26   ALKPHOS 55 77   BILITOT 0.6 0.4       No results for input(s): \"LABALBU\" in the last 72 hours.    Ferritin   Date Value Ref Range Status   06/07/2024 439 ng/mL Final     Comment:           FERRITIN Reference Ranges:  Adult Males   20 - 60 years:    30 - 400 ng/mL  Adult females 17 - 60 years:    13 - 150 ng/mL  Adults greater than 60 years:   no established reference range  Pediatrics:  no established reference range         Vitamin B-12   Date Value Ref Range Status   06/07/2024 1327 (H) 211 - 946 pg/mL Final       Folate   Date Value Ref Range Status   06/07/2024 4.0 (L) 4.8 - 24.2 ng/mL Final       Lab Results   Component Value Date/Time    COLORU Yellow 06/02/2024 07:45 AM    NITRU NEGATIVE 06/02/2024 07:45 
°F (37.2 °C) Temporal 90 16 -- --   06/06/24 1521 -- -- -- 90 (!) 31 100 % --   06/06/24 1520 -- -- -- 90 (!) 32 100 % --   06/06/24 1519 129/88 -- -- 90 (!) 33 100 % --   06/06/24 1518 -- -- -- 90 30 99 % --   06/06/24 1500 118/85 -- -- 91 (!) 33 100 % --   06/06/24 1407 -- -- -- 97 30 98 % --   06/06/24 1128 (!) 150/95 98.3 °F (36.8 °C) Axillary 100 22 90 % --         Intake/Output Summary (Last 24 hours) at 6/7/2024 1045  Last data filed at 6/7/2024 0700  Gross per 24 hour   Intake 8586.96 ml   Output 1390 ml   Net 7196.96 ml       General: Intubated  Neck: No JVD noted  Lungs: Rhonchi bilaterally upper, diminished to the bases bilaterally.  Intubated/vent  CV: Regular rate and rhythm.  No rub  Abd: Soft, nontender, nondistended.  Active bowel sounds  Skin: Warm and dry.  No rash on exposed extremities  Ext: Trace BUE/BLE edema   Neuro: Intubated/lethargic    Data:    Recent Labs     06/05/24  0542 06/07/24  0420   WBC 9.0 11.9*   HGB 10.3* 9.2*   HCT 34.5* 28.8*   .5* 103.6*    290       Recent Labs     06/05/24  0542 06/05/24  0746 06/06/24  0802 06/06/24  1741 06/07/24  0420   *   < > 157*  158* 152* 148*   K 3.2*  --  2.9* 3.2* 3.5   *  --  123* 122* 115*   CO2 19*  --  20* 19* 19*   CREATININE 1.4*  --  1.3* 1.5* 1.7*   BUN 9  --  11 16 23   LABGLOM 57*  --  64 54* 47*   GLUCOSE 131*  --  127* 125* 143*   CALCIUM 9.4  --  9.4 8.8 8.5*   PHOS  --   --   --  3.4 2.8   MG 2.1  --   --  1.9 1.9    < > = values in this interval not displayed.       No results found for: \"VITD25\"    No results found for: \"PTH\"    Recent Labs     06/07/24  0420   ALT 6   AST 23   ALKPHOS 55   BILITOT 0.6       No results for input(s): \"LABALBU\" in the last 72 hours.    No results found for: \"FERRITIN\", \"IRON\", \"TIBC\"    Vitamin B-12   Date Value Ref Range Status   06/07/2024 1327 (H) 211 - 946 pg/mL Final       Folate   Date Value Ref Range Status   06/07/2024 4.0 (L) 4.8 - 24.2 ng/mL Final       Lab 
        No results found for: \"VITD25\"    No results found for: \"PTH\"    Recent Labs     06/13/24  0501 06/14/24  0359 06/15/24  0516   ALT <5 8 12   AST 14 19 21   ALKPHOS 79 79 83   BILITOT 0.2 0.4 0.3         No results for input(s): \"LABALBU\" in the last 72 hours.    Ferritin   Date Value Ref Range Status   06/07/2024 439 ng/mL Final     Comment:           FERRITIN Reference Ranges:  Adult Males   20 - 60 years:    30 - 400 ng/mL  Adult females 17 - 60 years:    13 - 150 ng/mL  Adults greater than 60 years:   no established reference range  Pediatrics:  no established reference range         Vitamin B-12   Date Value Ref Range Status   06/07/2024 1327 (H) 211 - 946 pg/mL Final       Folate   Date Value Ref Range Status   06/07/2024 4.0 (L) 4.8 - 24.2 ng/mL Final       Lab Results   Component Value Date/Time    COLORU Yellow 06/02/2024 07:45 AM    NITRU NEGATIVE 06/02/2024 07:45 AM    GLUCOSEU NEGATIVE 06/02/2024 07:45 AM    KETUA NEGATIVE 06/02/2024 07:45 AM    UROBILINOGEN 0.2 06/02/2024 07:45 AM    BILIRUBINUR NEGATIVE 06/02/2024 07:45 AM       Lab Results   Component Value Date/Time    PROTEIN 5.4 (L) 06/15/2024 05:16 AM    OSMOU 193 (L) 06/08/2024 09:45 AM       No components found for: \"URIC\"    No results found for: \"LIPIDPAN\"    Assessment and Plans:    Hypernatremia  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  Altered mental status  Hypernatremia likely contributing  Rule out sepsis  Parainfluenza positive  CT head 6/2 no evidence of acute intracranial hemorrhage or mass effect  Psychiatry previously 
      Parainfluenza 3 PCR DETECTED Abnormal      Sputum cx -        Methicillin-Resistant Staphylococcus aureus (1)      Antibiotic Interpretation Microscan Method Status   clindamycin Sensitive 0.25 BACTERIAL SUSCEPTIBILITY PANEL JONN Final   DAPTOmycin Sensitive 0.25 BACTERIAL SUSCEPTIBILITY PANEL JONN Final   erythromycin Resistant >=8 BACTERIAL SUSCEPTIBILITY PANEL JONN Final   gentamicin Sensitive <=0.5 BACTERIAL SUSCEPTIBILITY PANEL JONN Final    Gentamicin is used only in combination with other active agents that test susceptible.      Induced Clind Resist Sensitive NEGATIVE BACTERIAL SUSCEPTIBILITY PANEL JONN Final   oxacillin Resistant >=4 BACTERIAL SUSCEPTIBILITY PANEL JONN Final   rifampin Sensitive <=0.5 BACTERIAL SUSCEPTIBILITY PANEL JONN Final   tigecycline Sensitive <=0.12 BACTERIAL SUSCEPTIBILITY PANEL JONN Final   trimethoprim-sulfamethoxazole Sensitive <=10 BACTERIAL SUSCEPTIBILITY PANEL JONN Final   vancomycin Sensitive 1 BACTERIAL SUSCEPTIBILITY PANEL JONN Final   doxycycline Sensitive <=0.5 BACTERIAL SUSCEPTIBILITY PANEL JONN Final         Vancomycin Tr 27.3 High  ug/mL       Radiology :    Chest X ray    Right basilar infiltrate. Left lower lobe atelectasis and tiny left effusion.       IMPRESSION:     Respiratory failure - intubated  MRSA  pneumonia   Parainfluenza virus infection       RECOMMENDATIONS:      Vancomycin intermittent  ( clinical pharmacy following ) ( day 10 )   2.   Supportive care    
stopping lithium  DDAVP resumed    5. Resp failure  Intubated 6/6  Staph pneumonia on resp cx  Sp bronch with lavage of pus/mrsa  Para influenza as well  H/o MG    6. Htn  Inc norvasc to 10 qd     Nolan Lockhart MD  
(hyperlipidemia)    GERD (gastroesophageal reflux disease)    Asthma    Hypothyroid    Depression    Anxiety    Acute respiratory failure (HCC)    Palliative care encounter    Goals of care, counseling/discussion  Resolved Problems:    Hyponatremia    Additional assessment:    Acute Hypoxic Respiratory Failure - immunocompromised host   MRSA - B/L multi-Lobar   Bronchoscpy with trach completed by surgery on 6/18/2024 at bedside   Multi-Factorial Encephalopathy  (LP was unrevealing other than protein elevation 52.9 and glucose 78)    Parainfluenza Viral Tracheobronchitis   Multi-Factorial Encephalopathy   H/O Myasthenia Gravis  Outpatient azathioprine   IVIG initiation 6/18/2024  Diabetes Insipidus   Lithium Toxicity   Bipolar Disorder with Depression   Obesity - BMI 35  Parkinson's Disease - on carbidopa/levodopa   Mild Eosinophilia - eos 210   Folate Deficiency     PLAN:     WEAN PER PROTOCOL:  [] No   [x] Yes  [] N/A    DISCONTINUE ANY LABS:   [x] No   [] Yes    ICU PROPHYLAXIS:  Stress ulcer:  [x] PPI Agent  [] [id number] [] Sucralfate  [] Other:  VTE:   [] Enoxaparin  [] Unfract. Heparin Subcut  [] EPC Cuffs    NUTRITION:  [x] NPO [] Tube Feeding (Specify: ) [] TPN  [] PO (Diet: Diet NPO Exceptions are: Sips of Water with Meds  ADULT TUBE FEEDING; Nasogastric; Standard with Fiber; Continuous; 20; Yes; 10; Q 4 hours; 55; 500; Q 6 hours; Protein; 1 Dose; Daily)    HOME MEDICATIONS RECONCILED: [x] No  [] Yes    INSULIN DRIP:   [x] No   [] Yes    CONSULTATION NEEDED:  [x] No   [] Yes    FAMILY UPDATED:    [x] No   [] Yes    TRANSFER OUT OF ICU:   [x] No   [] Yes    ADDITIONAL PLAN:    Attending Physician Attestation: Dr. Hernando Riley    Thank you very much for allowing me to see this patient in consultation and follow up.    I personally saw, examined and provided care for the patient. Radiographs, labs and medication list were reviewed by me independently. I spoke with bedside nursing, respiratory therapists and 
toxicity.   We do not recommend the use of any psychotropic medications at this time.    [facility] upon medical stability if still warranted and they may continue his psychiatric care for continuity.    Depakote sprinkle 125 twice daily x 10 days to reduce confusion  Melatonin 6 mg evening for 10 days     Psychiatry signs off         NOTE: This report was transcribed using voice recognition software. Every effort was made to ensure accuracy; however, inadvertent computerized transcription errors may be present.      Electronically signed by JANAE Shea CNP on 6/3/2024 at 1:41 PM      
devices.    Critical Care Time: 35 minutes excluding procedures    Hernando Riley M.D.    Hernando Riley MD  6/17/2024  10:58 AM      Hernando Riley MD, M.D. F.C.C.P                     6/17/2024, 10:55 AM      NOTE: This report, in part or full, may have been transcribed using voice recognition software. Every effort was made to ensure accuracy; however, inadvertent computerized transcription errors may be present. Please excuse any transcriptional grammatical or spelling errors that may have escaped my editorial review.

## 2024-06-21 LAB
MICROORGANISM SPEC CULT: NORMAL
MICROORGANISM SPEC CULT: NORMAL
MICROORGANISM/AGENT SPEC: NORMAL
MICROORGANISM/AGENT SPEC: NORMAL
SERVICE CMNT-IMP: NORMAL
SERVICE CMNT-IMP: NORMAL
SPECIMEN DESCRIPTION: NORMAL
SPECIMEN DESCRIPTION: NORMAL

## 2024-06-23 LAB
MICROORGANISM SPEC CULT: NORMAL
MICROORGANISM/AGENT SPEC: NORMAL
SEND OUT REPORT: NORMAL
SPECIMEN DESCRIPTION: NORMAL

## 2024-06-25 LAB
MICROORGANISM SPEC CULT: NO GROWTH
MICROORGANISM/AGENT SPEC: NORMAL
SPECIMEN DESCRIPTION: NORMAL

## 2024-06-27 LAB
MICROORGANISM SPEC CULT: NORMAL
MICROORGANISM/AGENT SPEC: NORMAL
SERVICE CMNT-IMP: NORMAL
SPECIMEN DESCRIPTION: NORMAL

## 2024-07-04 LAB
MICROORGANISM SPEC CULT: ABNORMAL
MICROORGANISM SPEC CULT: NORMAL
MICROORGANISM/AGENT SPEC: ABNORMAL
MICROORGANISM/AGENT SPEC: NORMAL
SERVICE CMNT-IMP: ABNORMAL
SERVICE CMNT-IMP: NORMAL
SPECIMEN DESCRIPTION: ABNORMAL
SPECIMEN DESCRIPTION: NORMAL

## 2024-08-01 LAB
MICROORGANISM SPEC CULT: NORMAL
MICROORGANISM/AGENT SPEC: NORMAL
SERVICE CMNT-IMP: NORMAL
SPECIMEN DESCRIPTION: NORMAL

## (undated) DEVICE — Device

## (undated) DEVICE — GAUZE,SPONGE,4"X4",8PLY,STRL,LF,10/TRAY: Brand: MEDLINE

## (undated) DEVICE — SOLUTION IRRIG 500ML 0.9% SOD CHLO USP POUR PLAS BTL

## (undated) DEVICE — BRONCHOSCOPY PACK: Brand: MEDLINE INDUSTRIES, INC.

## (undated) DEVICE — DEFENDO AIR WATER SUCTION AND BIOPSY VALVE KIT FOR  OLYMPUS: Brand: DEFENDO AIR/WATER/SUCTION AND BIOPSY VALVE

## (undated) DEVICE — DOUBLE  SWIVEL ELBOW 15M - DOUBLE FLIP TOP CAP WITH SEAL - 22M/15F: Brand: DOUBLE  SWIVEL ELBOW 15M - DOUBLE FLIP TOP CAP WITH SEAL - 22M/15F

## (undated) DEVICE — SINGLE USE BIOPSY VALVE MAJ-210: Brand: SINGLE USE BIOPSY VALVE (STERILE)

## (undated) DEVICE — BITEBLOCK 54FR W/ DENT RIM BLOX

## (undated) DEVICE — SINGLE USE SUCTION VALVE MAJ-209: Brand: SINGLE USE SUCTION VALVE (STERILE)